# Patient Record
Sex: FEMALE | Race: WHITE | Employment: FULL TIME | URBAN - NONMETROPOLITAN AREA
[De-identification: names, ages, dates, MRNs, and addresses within clinical notes are randomized per-mention and may not be internally consistent; named-entity substitution may affect disease eponyms.]

---

## 2023-09-21 ENCOUNTER — HOSPITAL ENCOUNTER (INPATIENT)
Age: 23
LOS: 2 days | Discharge: HOME | DRG: 343 | End: 2023-09-23
Attending: EMERGENCY MEDICINE | Admitting: SURGERY

## 2023-09-21 ENCOUNTER — ANESTHESIA (OUTPATIENT)
Dept: OPERATING ROOM | Age: 23
End: 2023-09-21

## 2023-09-21 ENCOUNTER — APPOINTMENT (OUTPATIENT)
Dept: CT IMAGING | Age: 23
DRG: 343 | End: 2023-09-21

## 2023-09-21 ENCOUNTER — ANESTHESIA EVENT (OUTPATIENT)
Dept: OPERATING ROOM | Age: 23
End: 2023-09-21

## 2023-09-21 DIAGNOSIS — K35.80 ACUTE APPENDICITIS, UNSPECIFIED ACUTE APPENDICITIS TYPE: Primary | ICD-10-CM

## 2023-09-21 DIAGNOSIS — R10.9 ABDOMINAL PAIN, UNSPECIFIED ABDOMINAL LOCATION: ICD-10-CM

## 2023-09-21 LAB
ALBUMIN SERPL BCG-MCNC: 4.4 G/DL (ref 3.5–5.1)
ALP SERPL-CCNC: 68 U/L (ref 38–126)
ALT SERPL W/O P-5'-P-CCNC: 9 U/L (ref 11–66)
ANION GAP SERPL CALC-SCNC: 11 MEQ/L (ref 8–16)
AST SERPL-CCNC: 15 U/L (ref 5–40)
BACTERIA URNS QL MICRO: ABNORMAL /HPF
BASOPHILS ABSOLUTE: 0 THOU/MM3 (ref 0–0.1)
BASOPHILS NFR BLD AUTO: 0.2 %
BILIRUB CONJ SERPL-MCNC: < 0.2 MG/DL (ref 0–0.3)
BILIRUB SERPL-MCNC: 0.5 MG/DL (ref 0.3–1.2)
BILIRUB UR QL STRIP.AUTO: NEGATIVE
BUN SERPL-MCNC: 11 MG/DL (ref 7–22)
CALCIUM SERPL-MCNC: 9.5 MG/DL (ref 8.5–10.5)
CASTS #/AREA URNS LPF: ABNORMAL /LPF
CASTS 2: ABNORMAL /LPF
CHARACTER UR: ABNORMAL
CHLORIDE SERPL-SCNC: 105 MEQ/L (ref 98–111)
CO2 SERPL-SCNC: 24 MEQ/L (ref 23–33)
COLOR: YELLOW
CREAT SERPL-MCNC: 0.7 MG/DL (ref 0.4–1.2)
CRYSTALS URNS MICRO: ABNORMAL
DEPRECATED RDW RBC AUTO: 42.8 FL (ref 35–45)
EOSINOPHIL NFR BLD AUTO: 0.1 %
EOSINOPHILS ABSOLUTE: 0 THOU/MM3 (ref 0–0.4)
EPITHELIAL CELLS, UA: ABNORMAL /HPF
ERYTHROCYTE [DISTWIDTH] IN BLOOD BY AUTOMATED COUNT: 15.4 % (ref 11.5–14.5)
GFR SERPL CREATININE-BSD FRML MDRD: > 60 ML/MIN/1.73M2
GLUCOSE SERPL-MCNC: 114 MG/DL (ref 70–108)
GLUCOSE UR QL STRIP.AUTO: NEGATIVE MG/DL
HCG UR QL: NEGATIVE
HCT VFR BLD AUTO: 39.6 % (ref 37–47)
HGB BLD-MCNC: 11.9 GM/DL (ref 12–16)
HGB UR QL STRIP.AUTO: NEGATIVE
IMM GRANULOCYTES # BLD AUTO: 0.05 THOU/MM3 (ref 0–0.07)
IMM GRANULOCYTES NFR BLD AUTO: 0.3 %
KETONES UR QL STRIP.AUTO: 40
LIPASE SERPL-CCNC: 28.2 U/L (ref 5.6–51.3)
LYMPHOCYTES ABSOLUTE: 0.7 THOU/MM3 (ref 1–4.8)
LYMPHOCYTES NFR BLD AUTO: 4.2 %
MCH RBC QN AUTO: 23.3 PG (ref 26–33)
MCHC RBC AUTO-ENTMCNC: 30.1 GM/DL (ref 32.2–35.5)
MCV RBC AUTO: 77.6 FL (ref 81–99)
MISCELLANEOUS 2: ABNORMAL
MONOCYTES ABSOLUTE: 1.7 THOU/MM3 (ref 0.4–1.3)
MONOCYTES NFR BLD AUTO: 10.4 %
NEUTROPHILS NFR BLD AUTO: 84.8 %
NITRITE UR QL STRIP: NEGATIVE
NRBC BLD AUTO-RTO: 0 /100 WBC
OSMOLALITY SERPL CALC.SUM OF ELEC: 279.7 MOSMOL/KG (ref 275–300)
PH UR STRIP.AUTO: 8.5 [PH] (ref 5–9)
PLATELET # BLD AUTO: 470 THOU/MM3 (ref 130–400)
PMV BLD AUTO: 10.7 FL (ref 9.4–12.4)
POTASSIUM SERPL-SCNC: 4.3 MEQ/L (ref 3.5–5.2)
PROT SERPL-MCNC: 7.3 G/DL (ref 6.1–8)
PROT UR STRIP.AUTO-MCNC: ABNORMAL MG/DL
RBC # BLD AUTO: 5.1 MILL/MM3 (ref 4.2–5.4)
RBC URINE: ABNORMAL /HPF
RENAL EPI CELLS #/AREA URNS HPF: ABNORMAL /[HPF]
SEGMENTED NEUTROPHILS ABSOLUTE COUNT: 14.2 THOU/MM3 (ref 1.8–7.7)
SODIUM SERPL-SCNC: 140 MEQ/L (ref 135–145)
SP GR UR REFRACT.AUTO: 1.02 (ref 1–1.03)
UROBILINOGEN, URINE: 0.2 EU/DL (ref 0–1)
WBC # BLD AUTO: 16.7 THOU/MM3 (ref 4.8–10.8)
WBC #/AREA URNS HPF: ABNORMAL /HPF
WBC #/AREA URNS HPF: ABNORMAL /[HPF]
YEAST LIKE FUNGI URNS QL MICRO: ABNORMAL

## 2023-09-21 PROCEDURE — 6370000000 HC RX 637 (ALT 250 FOR IP): Performed by: EMERGENCY MEDICINE

## 2023-09-21 PROCEDURE — 80053 COMPREHEN METABOLIC PANEL: CPT

## 2023-09-21 PROCEDURE — 85025 COMPLETE CBC W/AUTO DIFF WBC: CPT

## 2023-09-21 PROCEDURE — 82248 BILIRUBIN DIRECT: CPT

## 2023-09-21 PROCEDURE — 2720000010 HC SURG SUPPLY STERILE: Performed by: SURGERY

## 2023-09-21 PROCEDURE — 6360000002 HC RX W HCPCS: Performed by: NURSE ANESTHETIST, CERTIFIED REGISTERED

## 2023-09-21 PROCEDURE — 3600000003 HC SURGERY LEVEL 3 BASE: Performed by: SURGERY

## 2023-09-21 PROCEDURE — 2580000003 HC RX 258: Performed by: EMERGENCY MEDICINE

## 2023-09-21 PROCEDURE — 6360000002 HC RX W HCPCS: Performed by: EMERGENCY MEDICINE

## 2023-09-21 PROCEDURE — 6360000002 HC RX W HCPCS: Performed by: SURGERY

## 2023-09-21 PROCEDURE — 3700000001 HC ADD 15 MINUTES (ANESTHESIA): Performed by: SURGERY

## 2023-09-21 PROCEDURE — C9399 UNCLASSIFIED DRUGS OR BIOLOG: HCPCS | Performed by: NURSE ANESTHETIST, CERTIFIED REGISTERED

## 2023-09-21 PROCEDURE — 83690 ASSAY OF LIPASE: CPT

## 2023-09-21 PROCEDURE — 81001 URINALYSIS AUTO W/SCOPE: CPT

## 2023-09-21 PROCEDURE — 3700000000 HC ANESTHESIA ATTENDED CARE: Performed by: SURGERY

## 2023-09-21 PROCEDURE — 6360000004 HC RX CONTRAST MEDICATION: Performed by: EMERGENCY MEDICINE

## 2023-09-21 PROCEDURE — 2709999900 HC NON-CHARGEABLE SUPPLY: Performed by: SURGERY

## 2023-09-21 PROCEDURE — 6370000000 HC RX 637 (ALT 250 FOR IP): Performed by: SURGERY

## 2023-09-21 PROCEDURE — 2500000003 HC RX 250 WO HCPCS: Performed by: NURSE ANESTHETIST, CERTIFIED REGISTERED

## 2023-09-21 PROCEDURE — 87086 URINE CULTURE/COLONY COUNT: CPT

## 2023-09-21 PROCEDURE — 99285 EMERGENCY DEPT VISIT HI MDM: CPT

## 2023-09-21 PROCEDURE — 7100000001 HC PACU RECOVERY - ADDTL 15 MIN: Performed by: SURGERY

## 2023-09-21 PROCEDURE — 1200000000 HC SEMI PRIVATE

## 2023-09-21 PROCEDURE — 2580000003 HC RX 258: Performed by: SURGERY

## 2023-09-21 PROCEDURE — 0DTJ4ZZ RESECTION OF APPENDIX, PERCUTANEOUS ENDOSCOPIC APPROACH: ICD-10-PCS | Performed by: SURGERY

## 2023-09-21 PROCEDURE — 74177 CT ABD & PELVIS W/CONTRAST: CPT

## 2023-09-21 PROCEDURE — 88304 TISSUE EXAM BY PATHOLOGIST: CPT

## 2023-09-21 PROCEDURE — 3600000013 HC SURGERY LEVEL 3 ADDTL 15MIN: Performed by: SURGERY

## 2023-09-21 PROCEDURE — 36415 COLL VENOUS BLD VENIPUNCTURE: CPT

## 2023-09-21 PROCEDURE — 81025 URINE PREGNANCY TEST: CPT

## 2023-09-21 PROCEDURE — 7100000000 HC PACU RECOVERY - FIRST 15 MIN: Performed by: SURGERY

## 2023-09-21 RX ORDER — ROCURONIUM BROMIDE 10 MG/ML
INJECTION, SOLUTION INTRAVENOUS PRN
Status: DISCONTINUED | OUTPATIENT
Start: 2023-09-21 | End: 2023-09-21 | Stop reason: SDUPTHER

## 2023-09-21 RX ORDER — ONDANSETRON 2 MG/ML
4 INJECTION INTRAMUSCULAR; INTRAVENOUS EVERY 6 HOURS PRN
Status: DISCONTINUED | OUTPATIENT
Start: 2023-09-21 | End: 2023-09-23 | Stop reason: HOSPADM

## 2023-09-21 RX ORDER — PHENYLEPHRINE HCL IN 0.9% NACL 1 MG/10 ML
SYRINGE (ML) INTRAVENOUS PRN
Status: DISCONTINUED | OUTPATIENT
Start: 2023-09-21 | End: 2023-09-21 | Stop reason: SDUPTHER

## 2023-09-21 RX ORDER — BUPIVACAINE HYDROCHLORIDE 5 MG/ML
INJECTION, SOLUTION EPIDURAL; INTRACAUDAL PRN
Status: DISCONTINUED | OUTPATIENT
Start: 2023-09-21 | End: 2023-09-21 | Stop reason: ALTCHOICE

## 2023-09-21 RX ORDER — ACETAMINOPHEN 500 MG
1000 TABLET ORAL
Status: COMPLETED | OUTPATIENT
Start: 2023-09-21 | End: 2023-09-21

## 2023-09-21 RX ORDER — ONDANSETRON 2 MG/ML
4 INJECTION INTRAMUSCULAR; INTRAVENOUS EVERY 6 HOURS PRN
Status: DISCONTINUED | OUTPATIENT
Start: 2023-09-21 | End: 2023-09-21 | Stop reason: SDUPTHER

## 2023-09-21 RX ORDER — FENTANYL CITRATE 50 UG/ML
INJECTION, SOLUTION INTRAMUSCULAR; INTRAVENOUS PRN
Status: DISCONTINUED | OUTPATIENT
Start: 2023-09-21 | End: 2023-09-21 | Stop reason: SDUPTHER

## 2023-09-21 RX ORDER — METRONIDAZOLE 500 MG/100ML
500 INJECTION, SOLUTION INTRAVENOUS ONCE
Status: CANCELLED | OUTPATIENT
Start: 2023-09-21 | End: 2023-09-21

## 2023-09-21 RX ORDER — SUCCINYLCHOLINE/SOD CL,ISO/PF 200MG/10ML
SYRINGE (ML) INTRAVENOUS PRN
Status: DISCONTINUED | OUTPATIENT
Start: 2023-09-21 | End: 2023-09-21 | Stop reason: SDUPTHER

## 2023-09-21 RX ORDER — SODIUM CHLORIDE 9 MG/ML
INJECTION, SOLUTION INTRAVENOUS PRN
Status: DISCONTINUED | OUTPATIENT
Start: 2023-09-21 | End: 2023-09-21 | Stop reason: SDUPTHER

## 2023-09-21 RX ORDER — SODIUM CHLORIDE 0.9 % (FLUSH) 0.9 %
5-40 SYRINGE (ML) INJECTION PRN
Status: DISCONTINUED | OUTPATIENT
Start: 2023-09-21 | End: 2023-09-23 | Stop reason: HOSPADM

## 2023-09-21 RX ORDER — TESTOSTERONE CYPIONATE 200 MG/ML
0.25 VIAL (ML) INTRAMUSCULAR WEEKLY
Status: ON HOLD | COMMUNITY
End: 2023-09-23 | Stop reason: HOSPADM

## 2023-09-21 RX ORDER — KETOROLAC TROMETHAMINE 30 MG/ML
INJECTION, SOLUTION INTRAMUSCULAR; INTRAVENOUS PRN
Status: DISCONTINUED | OUTPATIENT
Start: 2023-09-21 | End: 2023-09-21 | Stop reason: SDUPTHER

## 2023-09-21 RX ORDER — MORPHINE SULFATE 4 MG/ML
4 INJECTION, SOLUTION INTRAMUSCULAR; INTRAVENOUS
Status: DISCONTINUED | OUTPATIENT
Start: 2023-09-21 | End: 2023-09-21

## 2023-09-21 RX ORDER — 0.9 % SODIUM CHLORIDE 0.9 %
1000 INTRAVENOUS SOLUTION INTRAVENOUS ONCE
Status: COMPLETED | OUTPATIENT
Start: 2023-09-21 | End: 2023-09-21

## 2023-09-21 RX ORDER — SODIUM CHLORIDE 0.9 % (FLUSH) 0.9 %
5-40 SYRINGE (ML) INJECTION EVERY 12 HOURS SCHEDULED
Status: DISCONTINUED | OUTPATIENT
Start: 2023-09-21 | End: 2023-09-23 | Stop reason: HOSPADM

## 2023-09-21 RX ORDER — PROPOFOL 10 MG/ML
INJECTION, EMULSION INTRAVENOUS PRN
Status: DISCONTINUED | OUTPATIENT
Start: 2023-09-21 | End: 2023-09-21 | Stop reason: SDUPTHER

## 2023-09-21 RX ORDER — ONDANSETRON 4 MG/1
4 TABLET, ORALLY DISINTEGRATING ORAL EVERY 8 HOURS PRN
Status: DISCONTINUED | OUTPATIENT
Start: 2023-09-21 | End: 2023-09-21 | Stop reason: SDUPTHER

## 2023-09-21 RX ORDER — SODIUM CHLORIDE 0.9 % (FLUSH) 0.9 %
5-40 SYRINGE (ML) INJECTION EVERY 12 HOURS SCHEDULED
Status: DISCONTINUED | OUTPATIENT
Start: 2023-09-21 | End: 2023-09-21 | Stop reason: SDUPTHER

## 2023-09-21 RX ORDER — SODIUM CHLORIDE 9 MG/ML
INJECTION, SOLUTION INTRAVENOUS CONTINUOUS
Status: DISCONTINUED | OUTPATIENT
Start: 2023-09-21 | End: 2023-09-21 | Stop reason: SDUPTHER

## 2023-09-21 RX ORDER — SODIUM CHLORIDE 9 MG/ML
INJECTION, SOLUTION INTRAVENOUS CONTINUOUS
Status: DISCONTINUED | OUTPATIENT
Start: 2023-09-21 | End: 2023-09-23 | Stop reason: HOSPADM

## 2023-09-21 RX ORDER — SODIUM CHLORIDE 0.9 % (FLUSH) 0.9 %
5-40 SYRINGE (ML) INJECTION PRN
Status: DISCONTINUED | OUTPATIENT
Start: 2023-09-21 | End: 2023-09-21 | Stop reason: SDUPTHER

## 2023-09-21 RX ORDER — CEFOXITIN 1 G/1
INJECTION, POWDER, FOR SOLUTION INTRAVENOUS PRN
Status: DISCONTINUED | OUTPATIENT
Start: 2023-09-21 | End: 2023-09-21 | Stop reason: SDUPTHER

## 2023-09-21 RX ORDER — SODIUM CHLORIDE 9 MG/ML
INJECTION, SOLUTION INTRAVENOUS PRN
Status: DISCONTINUED | OUTPATIENT
Start: 2023-09-21 | End: 2023-09-23 | Stop reason: HOSPADM

## 2023-09-21 RX ORDER — ONDANSETRON 4 MG/1
4 TABLET, ORALLY DISINTEGRATING ORAL EVERY 8 HOURS PRN
Status: DISCONTINUED | OUTPATIENT
Start: 2023-09-21 | End: 2023-09-23 | Stop reason: HOSPADM

## 2023-09-21 RX ORDER — ACETAMINOPHEN 500 MG
500 TABLET ORAL ONCE
Status: COMPLETED | OUTPATIENT
Start: 2023-09-21 | End: 2023-09-21

## 2023-09-21 RX ORDER — ONDANSETRON 2 MG/ML
INJECTION INTRAMUSCULAR; INTRAVENOUS PRN
Status: DISCONTINUED | OUTPATIENT
Start: 2023-09-21 | End: 2023-09-21 | Stop reason: SDUPTHER

## 2023-09-21 RX ORDER — MIDAZOLAM HYDROCHLORIDE 1 MG/ML
INJECTION INTRAMUSCULAR; INTRAVENOUS PRN
Status: DISCONTINUED | OUTPATIENT
Start: 2023-09-21 | End: 2023-09-21 | Stop reason: SDUPTHER

## 2023-09-21 RX ORDER — OXYCODONE HYDROCHLORIDE 5 MG/1
5 TABLET ORAL EVERY 4 HOURS PRN
Status: DISCONTINUED | OUTPATIENT
Start: 2023-09-21 | End: 2023-09-23 | Stop reason: HOSPADM

## 2023-09-21 RX ORDER — DEXAMETHASONE SODIUM PHOSPHATE 4 MG/ML
INJECTION, SOLUTION INTRA-ARTICULAR; INTRALESIONAL; INTRAMUSCULAR; INTRAVENOUS; SOFT TISSUE PRN
Status: DISCONTINUED | OUTPATIENT
Start: 2023-09-21 | End: 2023-09-21 | Stop reason: SDUPTHER

## 2023-09-21 RX ADMIN — KETOROLAC TROMETHAMINE 30 MG: 30 INJECTION, SOLUTION INTRAMUSCULAR; INTRAVENOUS at 16:46

## 2023-09-21 RX ADMIN — ROCURONIUM BROMIDE 30 MG: 10 INJECTION INTRAVENOUS at 16:29

## 2023-09-21 RX ADMIN — CEFOXITIN SODIUM 1000 MG: 2 POWDER, FOR SOLUTION INTRAVENOUS at 16:30

## 2023-09-21 RX ADMIN — Medication 200 MCG: at 16:34

## 2023-09-21 RX ADMIN — MIDAZOLAM 2 MG: 1 INJECTION INTRAMUSCULAR; INTRAVENOUS at 16:13

## 2023-09-21 RX ADMIN — SODIUM CHLORIDE: 9 INJECTION, SOLUTION INTRAVENOUS at 17:59

## 2023-09-21 RX ADMIN — ACETAMINOPHEN 500 MG: 500 TABLET ORAL at 12:47

## 2023-09-21 RX ADMIN — PROPOFOL 160 MG: 10 INJECTION, EMULSION INTRAVENOUS at 16:17

## 2023-09-21 RX ADMIN — ONDANSETRON 4 MG: 2 INJECTION INTRAMUSCULAR; INTRAVENOUS at 16:22

## 2023-09-21 RX ADMIN — DEXAMETHASONE SODIUM PHOSPHATE 8 MG: 4 INJECTION, SOLUTION INTRAMUSCULAR; INTRAVENOUS at 16:22

## 2023-09-21 RX ADMIN — FENTANYL CITRATE 50 MCG: 50 INJECTION, SOLUTION INTRAMUSCULAR; INTRAVENOUS at 16:34

## 2023-09-21 RX ADMIN — SODIUM CHLORIDE: 9 INJECTION, SOLUTION INTRAVENOUS at 15:18

## 2023-09-21 RX ADMIN — IOPAMIDOL 80 ML: 755 INJECTION, SOLUTION INTRAVENOUS at 12:21

## 2023-09-21 RX ADMIN — OXYCODONE HYDROCHLORIDE 5 MG: 5 TABLET ORAL at 19:30

## 2023-09-21 RX ADMIN — SODIUM CHLORIDE 1000 ML: 9 INJECTION, SOLUTION INTRAVENOUS at 12:11

## 2023-09-21 RX ADMIN — ACETAMINOPHEN 500 MG: 500 TABLET ORAL at 12:32

## 2023-09-21 RX ADMIN — Medication 100 MCG: at 16:30

## 2023-09-21 RX ADMIN — SODIUM CHLORIDE, PRESERVATIVE FREE 10 ML: 5 INJECTION INTRAVENOUS at 15:16

## 2023-09-21 RX ADMIN — HYDROMORPHONE HYDROCHLORIDE 0.5 MG: 1 INJECTION, SOLUTION INTRAMUSCULAR; INTRAVENOUS; SUBCUTANEOUS at 21:35

## 2023-09-21 RX ADMIN — FENTANYL CITRATE 50 MCG: 50 INJECTION, SOLUTION INTRAMUSCULAR; INTRAVENOUS at 16:15

## 2023-09-21 RX ADMIN — Medication 130 MG: at 16:17

## 2023-09-21 RX ADMIN — SUGAMMADEX 200 MG: 100 INJECTION, SOLUTION INTRAVENOUS at 17:01

## 2023-09-21 ASSESSMENT — PAIN DESCRIPTION - PAIN TYPE: TYPE: SURGICAL PAIN

## 2023-09-21 ASSESSMENT — PAIN DESCRIPTION - ORIENTATION: ORIENTATION: MID

## 2023-09-21 ASSESSMENT — PAIN - FUNCTIONAL ASSESSMENT
PAIN_FUNCTIONAL_ASSESSMENT: PREVENTS OR INTERFERES SOME ACTIVE ACTIVITIES AND ADLS
PAIN_FUNCTIONAL_ASSESSMENT: 0-10

## 2023-09-21 ASSESSMENT — PAIN DESCRIPTION - LOCATION
LOCATION: ABDOMEN

## 2023-09-21 ASSESSMENT — PAIN SCALES - GENERAL
PAINLEVEL_OUTOF10: 8
PAINLEVEL_OUTOF10: 3
PAINLEVEL_OUTOF10: 6
PAINLEVEL_OUTOF10: 8
PAINLEVEL_OUTOF10: 6
PAINLEVEL_OUTOF10: 3
PAINLEVEL_OUTOF10: 6
PAINLEVEL_OUTOF10: 6
PAINLEVEL_OUTOF10: 4

## 2023-09-21 ASSESSMENT — PAIN DESCRIPTION - ONSET: ONSET: ON-GOING

## 2023-09-21 ASSESSMENT — PAIN DESCRIPTION - FREQUENCY: FREQUENCY: CONTINUOUS

## 2023-09-21 ASSESSMENT — PAIN DESCRIPTION - DESCRIPTORS: DESCRIPTORS: SORE

## 2023-09-21 NOTE — PROGRESS NOTES
1709- pt to pacu. Moving erratically in bed. Verbal redirection, successful. VSS pt appears in no acute distress. 1718- Pt resting in bed eyes closed. Denies pain when asked. 1721- report called to 6A nurse Lety Jacques- pt talkative. In bed eyes open. Continues to deny pain, nausea. 1739- pt meets criteria for discharge from pacu. Awaiting transport.     1741-transport arrives to take pt back to 6a

## 2023-09-21 NOTE — ED PROVIDER NOTES

## 2023-09-21 NOTE — ED TRIAGE NOTES
Pt to ED due to lower right abdominal pain starting last night. Pt states that he felt a sudden pain and it has not gone away. Pt states that he has had one bowel movement that was very hard. Pt states he is nauseous. States he tried to take aleve this morning but became nauseous and vomited. Pt lower abdomen is painful to palpation.

## 2023-09-21 NOTE — BRIEF OP NOTE
Brief Postoperative Note      Patient: Berhane Duenas  YOB: 2000  MRN: [id number]    Date of Procedure: 9/21/2023    PRE- OPaCUTE aPPENDICITIS    Post-Op Diagnosis: SAME       Procedure(s):  Laparoscopic Appendectomy    Surgeon(s):  Modesta Colón MD    Assistant:      Anesthesia: General    Estimated Blood Loss (mL): 5 ML    Complications: NONE    Specimens:   ID Type Source Tests Collected by Time Destination   A : appendix Tissue Appendix SURGICAL PATHOLOGY Modesta Colón MD 9/21/2023 1638        Implants:        Drains:   NG/OG/NJ/NE Tube Orogastric 18 fr Center mouth (Active)       Findings: SEE OP NOTE      Electronically signed by Modesta Colón MD on 9/21/2023 at 5:16 PM

## 2023-09-21 NOTE — PROGRESS NOTES
Patient voided, washed abdomen with CHG soap, SCD's applied. Zosyn was not present on floor to be able to hang pre-op. Patient's roommates accompanied patient leaving unit for OR at this time.

## 2023-09-21 NOTE — H&P
[place] [address]                              HISTORY AND PHYSICAL    PATIENT NAME: Vina Lesches                    :        2000  MED REC NO:   [id number]                           ROOM:       0009  ACCOUNT NO:   [de-identified]                           ADMIT DATE: 2023  PROVIDER:     Mary Rabago. Chris Cooper MD    CHIEF COMPLAINT:  Acute appendicitis. HISTORY OF PRESENT ILLNESS:  The patient is a 32-year-old female who, I  believe, may be transitioning to a male, who has otherwise been  previously healthy, but had onset last night of right-sided abdominal  pain. It became more severe, localized to the right lower quadrant. She presented to the emergency room for evaluation. In the ER, she is  known to have a leukocytosis of 16,000 and had a CT scan showing  evidence of acute appendicitis and surgical evaluation has been  requested. PAST MEDICAL HISTORY/MEDICAL ILLNESSES:  None. Again, the patient is  currently on testosterone and, I believe, is a female transitioning to a  male. SURGERIES:  [place] teeth removed, otherwise no surgeries. ALLERGIES:  TO LATEX. FAMILY HISTORY:  Negative for appendicitis. SOCIAL HISTORY:  The patient is a nonsmoker but does admit to alcohol  use socially. MEDICATIONS:  Testosterone. REVIEW OF SYSTEMS:  10-point review of systems otherwise negative. 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 PHYSICAL EXAMINATION:  GENERAL:  The patient is a 32-year-old, I guess, female, as per the  chart, who appears her age. HEAD, EARS, EYES, NOSE, AND THROAT:  Pupils are equal.  EOMs are intact. Sclerae are clear. CARDIAC:  S1, S2.  LUNGS:  Respirations are clear. ABDOMEN:  Soft. Bowel sounds are positive. The patient has a mild  positive Rovsing's sign, has a clear-cut positive pain to palpation over  McBurney's point. :  Groins are negative.   EXTREMITIES:

## 2023-09-21 NOTE — PROGRESS NOTES
Patient arrived to  in wheelchair from ED. INT present in [facility]. Oriented to room. Call light within reach.

## 2023-09-21 NOTE — PROGRESS NOTES
Patient returned to  following surgery. Patient states pain is 4/10 and tolerable. Sites x3 with steri strips and bandaids, all dry and intact. Ice pack in place. SCD's reapplied and turned on. 0.9NS infusing into LAC, new bag hung. Patient updated on plan of care. Bed alarm turned on. Call light within reach.

## 2023-09-22 LAB
BACTERIA UR CULT: ABNORMAL
BASOPHILS ABSOLUTE: 0 THOU/MM3 (ref 0–0.1)
BASOPHILS NFR BLD AUTO: 0.1 %
DEPRECATED RDW RBC AUTO: 44.6 FL (ref 35–45)
EOSINOPHIL NFR BLD AUTO: 0 %
EOSINOPHILS ABSOLUTE: 0 THOU/MM3 (ref 0–0.4)
ERYTHROCYTE [DISTWIDTH] IN BLOOD BY AUTOMATED COUNT: 15.8 % (ref 11.5–14.5)
HCT VFR BLD AUTO: 31.7 % (ref 37–47)
HGB BLD-MCNC: 9.5 GM/DL (ref 12–16)
IMM GRANULOCYTES # BLD AUTO: 0.09 THOU/MM3 (ref 0–0.07)
IMM GRANULOCYTES NFR BLD AUTO: 0.5 %
LYMPHOCYTES ABSOLUTE: 1.1 THOU/MM3 (ref 1–4.8)
LYMPHOCYTES NFR BLD AUTO: 6.4 %
MCH RBC QN AUTO: 23.4 PG (ref 26–33)
MCHC RBC AUTO-ENTMCNC: 30 GM/DL (ref 32.2–35.5)
MCV RBC AUTO: 78.1 FL (ref 81–99)
MONOCYTES ABSOLUTE: 1.4 THOU/MM3 (ref 0.4–1.3)
MONOCYTES NFR BLD AUTO: 8.2 %
NEUTROPHILS NFR BLD AUTO: 84.8 %
NRBC BLD AUTO-RTO: 0 /100 WBC
ORGANISM: ABNORMAL
PLATELET # BLD AUTO: 462 THOU/MM3 (ref 130–400)
PMV BLD AUTO: 10.5 FL (ref 9.4–12.4)
RBC # BLD AUTO: 4.06 MILL/MM3 (ref 4.2–5.4)
SEGMENTED NEUTROPHILS ABSOLUTE COUNT: 14.8 THOU/MM3 (ref 1.8–7.7)
WBC # BLD AUTO: 17.4 THOU/MM3 (ref 4.8–10.8)

## 2023-09-22 PROCEDURE — 36415 COLL VENOUS BLD VENIPUNCTURE: CPT

## 2023-09-22 PROCEDURE — 6370000000 HC RX 637 (ALT 250 FOR IP): Performed by: SURGERY

## 2023-09-22 PROCEDURE — 1200000000 HC SEMI PRIVATE

## 2023-09-22 PROCEDURE — 6360000002 HC RX W HCPCS: Performed by: SURGERY

## 2023-09-22 PROCEDURE — 85025 COMPLETE CBC W/AUTO DIFF WBC: CPT

## 2023-09-22 PROCEDURE — 2580000003 HC RX 258: Performed by: SURGERY

## 2023-09-22 RX ORDER — ACETAMINOPHEN 325 MG/1
650 TABLET ORAL EVERY 6 HOURS PRN
Status: DISCONTINUED | OUTPATIENT
Start: 2023-09-22 | End: 2023-09-23 | Stop reason: HOSPADM

## 2023-09-22 RX ADMIN — OXYCODONE HYDROCHLORIDE 5 MG: 5 TABLET ORAL at 04:25

## 2023-09-22 RX ADMIN — SODIUM CHLORIDE: 9 INJECTION, SOLUTION INTRAVENOUS at 11:04

## 2023-09-22 RX ADMIN — SODIUM CHLORIDE: 9 INJECTION, SOLUTION INTRAVENOUS at 02:57

## 2023-09-22 RX ADMIN — HYDROMORPHONE HYDROCHLORIDE 0.5 MG: 1 INJECTION, SOLUTION INTRAMUSCULAR; INTRAVENOUS; SUBCUTANEOUS at 02:59

## 2023-09-22 RX ADMIN — OXYCODONE HYDROCHLORIDE 5 MG: 5 TABLET ORAL at 13:51

## 2023-09-22 RX ADMIN — ACETAMINOPHEN 650 MG: 325 TABLET ORAL at 16:39

## 2023-09-22 RX ADMIN — OXYCODONE HYDROCHLORIDE 5 MG: 5 TABLET ORAL at 08:27

## 2023-09-22 RX ADMIN — ACETAMINOPHEN 650 MG: 325 TABLET ORAL at 10:30

## 2023-09-22 RX ADMIN — OXYCODONE HYDROCHLORIDE 5 MG: 5 TABLET ORAL at 18:02

## 2023-09-22 RX ADMIN — OXYCODONE HYDROCHLORIDE 5 MG: 5 TABLET ORAL at 22:04

## 2023-09-22 ASSESSMENT — PAIN SCALES - GENERAL
PAINLEVEL_OUTOF10: 6
PAINLEVEL_OUTOF10: 0
PAINLEVEL_OUTOF10: 6
PAINLEVEL_OUTOF10: 6
PAINLEVEL_OUTOF10: 7
PAINLEVEL_OUTOF10: 3
PAINLEVEL_OUTOF10: 9
PAINLEVEL_OUTOF10: 3
PAINLEVEL_OUTOF10: 5
PAINLEVEL_OUTOF10: 6
PAINLEVEL_OUTOF10: 9
PAINLEVEL_OUTOF10: 3
PAINLEVEL_OUTOF10: 3
PAINLEVEL_OUTOF10: 5
PAINLEVEL_OUTOF10: 3

## 2023-09-22 ASSESSMENT — PAIN DESCRIPTION - ORIENTATION
ORIENTATION: RIGHT;UPPER;OUTER
ORIENTATION: RIGHT;UPPER;OUTER

## 2023-09-22 ASSESSMENT — PAIN - FUNCTIONAL ASSESSMENT: PAIN_FUNCTIONAL_ASSESSMENT: PREVENTS OR INTERFERES SOME ACTIVE ACTIVITIES AND ADLS

## 2023-09-22 ASSESSMENT — PAIN DESCRIPTION - ONSET: ONSET: ON-GOING

## 2023-09-22 ASSESSMENT — PAIN DESCRIPTION - PAIN TYPE: TYPE: SURGICAL PAIN

## 2023-09-22 ASSESSMENT — PAIN DESCRIPTION - DESCRIPTORS
DESCRIPTORS: ACHING
DESCRIPTORS: SPASM

## 2023-09-22 ASSESSMENT — PAIN DESCRIPTION - LOCATION
LOCATION: ABDOMEN

## 2023-09-22 ASSESSMENT — PAIN DESCRIPTION - FREQUENCY: FREQUENCY: CONTINUOUS

## 2023-09-22 NOTE — PLAN OF CARE
Problem: Discharge Planning  Goal: Discharge to home or other facility with appropriate resources  9/22/2023 0851 by Mary Jo Murillo RN  Outcome: Progressing  Flowsheets (Taken 9/22/2023 4058)  Discharge to home or other facility with appropriate resources:   Identify barriers to discharge with patient and caregiver   Arrange for needed discharge resources and transportation as appropriate   Identify discharge learning needs (meds, wound care, etc)     Problem: Pain  Goal: Verbalizes/displays adequate comfort level or baseline comfort level  9/22/2023 0851 by Mary Jo Murillo RN  Outcome: Progressing  Flowsheets (Taken 9/22/2023 7216)  Verbalizes/displays adequate comfort level or baseline comfort level:   Encourage patient to monitor pain and request assistance   Assess pain using appropriate pain scale   Administer analgesics based on type and severity of pain and evaluate response   Implement non-pharmacological measures as appropriate and evaluate response     Problem: Respiratory - Adult  Goal: Achieves optimal ventilation and oxygenation  9/22/2023 0851 by Mary Jo Murillo RN  Outcome: Progressing  Flowsheets (Taken 9/22/2023 4307)  Achieves optimal ventilation and oxygenation:   Assess for changes in respiratory status   Assess for changes in mentation and behavior     Problem: Gastrointestinal - Adult  Goal: Minimal or absence of nausea and vomiting  9/22/2023 0851 by Mary Jo Murillo RN  Outcome: Progressing  Flowsheets (Taken 9/22/2023 2930)  Minimal or absence of nausea and vomiting:   Administer IV fluids as ordered to ensure adequate hydration   Administer ordered antiemetic medications as needed     Problem: Gastrointestinal - Adult  Goal: Maintains or returns to baseline bowel function  9/22/2023 0851 by Mary Jo Murillo RN  Outcome: Progressing  Flowsheets (Taken 9/22/2023 3284)  Maintains or returns to baseline bowel function:   Assess bowel function   Encourage oral fluids to ensure adequate

## 2023-09-22 NOTE — PLAN OF CARE
Problem: Discharge Planning  Goal: Discharge to home or other facility with appropriate resources  Outcome: Progressing  Flowsheets (Taken 9/21/2023 1928)  Discharge to home or other facility with appropriate resources:   Identify barriers to discharge with patient and caregiver   Arrange for needed discharge resources and transportation as appropriate   Identify discharge learning needs (meds, wound care, etc)     Problem: Pain  Goal: Verbalizes/displays adequate comfort level or baseline comfort level  Outcome: Progressing  Flowsheets (Taken 9/21/2023 1928)  Verbalizes/displays adequate comfort level or baseline comfort level:   Encourage patient to monitor pain and request assistance   Assess pain using appropriate pain scale   Administer analgesics based on type and severity of pain and evaluate response   Implement non-pharmacological measures as appropriate and evaluate response   Consider cultural and social influences on pain and pain management     Problem: Respiratory - Adult  Goal: Achieves optimal ventilation and oxygenation  Outcome: Progressing  Flowsheets (Taken 9/21/2023 1928)  Achieves optimal ventilation and oxygenation:   Assess for changes in respiratory status   Assess for changes in mentation and behavior   Position to facilitate oxygenation and minimize respiratory effort   Encourage broncho-pulmonary hygiene including cough, deep breathe, incentive spirometry   Assess and instruct to report shortness of breath or any respiratory difficulty     Problem: Gastrointestinal - Adult  Goal: Minimal or absence of nausea and vomiting  Outcome: Progressing  Flowsheets (Taken 9/21/2023 1928)  Minimal or absence of nausea and vomiting:   Administer IV fluids as ordered to ensure adequate hydration   Administer ordered antiemetic medications as needed   Provide nonpharmacologic comfort measures as appropriate     Problem: Gastrointestinal - Adult  Goal: Maintains or returns to baseline bowel function  Outcome: Progressing  Flowsheets (Taken 9/21/2023 1928)  Maintains or returns to baseline bowel function:   Assess bowel function   Encourage oral fluids to ensure adequate hydration   Administer IV fluids as ordered to ensure adequate hydration   Administer ordered medications as needed   Encourage mobilization and activity     Problem: Infection - Adult  Goal: Absence of infection during hospitalization  Outcome: Progressing  Flowsheets (Taken 9/21/2023 1928)  Absence of infection during hospitalization:   Assess and monitor for signs and symptoms of infection   Monitor lab/diagnostic results   Administer medications as ordered   Instruct and encourage patient and family to use good hand hygiene technique     Problem: Skin/Tissue Integrity - Adult  Goal: Incisions, wounds, or drain sites healing without S/S of infection  Outcome: Progressing  Flowsheets (Taken 9/21/2023 2039)  Incisions, Wounds, or Drain Sites Healing Without Sign and Symptoms of Infection:   TWICE DAILY: Assess and document dressing/incision, wound bed, drain sites and surrounding tissue   Implement wound care per orders       Care plan reviewed with patient. Patient verbalizes understanding of the plan of care and contributes to goal setting.

## 2023-09-22 NOTE — OP NOTE
[place] [address]                                OPERATIVE REPORT    PATIENT NAME: Luisa Mario                    :        2000  MED REC NO:   [id number]                           ROOM:       0009  ACCOUNT NO:   [de-identified]                           ADMIT DATE: 2023  PROVIDER:     Salome Cruz. Coleman Silva MD    DATE OF PROCEDURE:  2023    PREOPERATIVE DIAGNOSIS:  Acute appendicitis. POSTOPERATIVE DIAGNOSIS:  Acute appendicitis. OPERATION:  Laparoscopic appendectomy. SURGEON:  Salome Cruz. MD Francisco    ANESTHESIA:  General.    COMPLICATIONS:  None. BLOOD LOSS:  5 mL. INDICATIONS FOR PROCEDURE:  The patient is a 71-year-old transitioning  from female to male who developed abdominal pain. CT scan showed  appendicitis. The patient is here for appendectomy. The patient did  have an appendicolith. We did discuss potential treatment with  antibiotics although appendicolith is a contraindication to antibiotic  treatment. FINDINGS:  The patient had a very inflamed appendix. There was pus on  its outside. Standard appendectomy was performed. The right ovary  appeared normal.    DESCRIPTION OF THE PROCEDURE:  The patient was brought to operating  suite, placed supine on the operating room table. After adequate  inhalational anesthesia was administered, the patient's abdomen was  prepped and draped in the usual sterile fashion. An incision was made  below the umbilicus. Veress needle was placed. CO2 was insufflated to  a pressure of 15. Veress needle was removed. A trocar was placed and a  camera was placed through the trocar verifying intra-abdominal placement  of trocar. A 5-mm suprapubic port was placed. A 12-mm lateral  abdominal wall port was placed. Then, the cecum was grasped and the  appendix could be seen, was clearly inflamed, was also in a curlicue  fashion.   I passed an Endo PUNEET into the abdominal cavity, made a plane  between the appendix and the cecum and passed the Endo PUNEET across the  base of the cecum  the appendix. We then passed an Endo PUNEET  across the mesoappendix dividing the mesoappendix and then delivered the  appendix out of the abdominal cavity with an Endo Catch. We went down  and viewed the right ovary that was normal.  The area around the  appendix stump was normal.  There was no bleeding. Then, closure was  begun. The trocars were removed as air was aspirated out of the  abdominal cavity. Interrupted 4-0 Vicryl was used to close the skin. Steri-Strips were applied. 10 mL of Marcaine was applied. The patient  tolerated the procedure well. MIKI GONZALEZ [facility], MD    D: 09/21/2023 17:28:00       T: 09/21/2023 17:31:31     [email or web address]  Job#: [id number]     Doc#: [id number]    CC:

## 2023-09-22 NOTE — CARE COORDINATION
Case Management Assessment  Initial Evaluation    Date/Time of Evaluation: 9/22/2023 9:18 AM  Assessment Completed by: Deandra Mckeon RN    If patient is discharged prior to next notation, then this note serves as note for discharge by case management. Patient Name: Yumiko Mantilla                   YOB: 2000  Diagnosis: Acute appendicitis [K35.80]  Acute appendicitis, unspecified acute appendicitis type [K35.80]                   Date / Time: 9/21/2023 10:56 AM  Location: [facility]09/009     Patient Admission Status: Inpatient   Readmission Risk Low 0-14, Mod 15-19), High > 20: Readmission Risk Score: 3.8    Current PCP: No primary care provider on file. PCP verified by CM? Yes    Chart Reviewed: Yes      History Provided by: Patient  Patient Orientation: Alert and Oriented    Patient Cognition: Alert    Hospitalization in the last 30 days (Readmission):  No    If yes, Readmission Assessment in CM Navigator will be completed. Advance Directives:      Code Status: Full Code   Patient's Primary Decision Maker is: Patient Declined (Legal Next of Kin Remains as Decision Maker)      Discharge Planning:    Patient lives with: Friends Type of Home: House  Primary Care Giver: Self  Patient Support Systems include: Spouse/Significant Other, Therapist, Friends/Neighbors   Current Financial resources: Other (Comment) (Patient has insurance; hasn't recieved card yet and doesn't know the insurance provider)  Current community resources: None  Current services prior to admission: Durable Medical Equipment            Current DME: Cane            Type of Home Care services:  None    ADLS  Prior functional level: Independent in ADLs/IADLs  Current functional level: Independent in ADLs/IADLs    Family can provide assistance at DC: Yes (Friends/Family)  Would you like Case Management to discuss the discharge plan with any other family members/significant others, and if so, who?  No  Plans to Return to Present Housing: Yes  Other Identified Issues/Barriers to RETURNING to current housing: none at this time  Potential Assistance needed at discharge: N/A            Potential DME:    Patient expects to discharge to: [address] [place] [address] for transportation at discharge: Friends    Financial    Payor: /     Does insurance require precert for SNF: No    Potential assistance Purchasing Medications: No  Meds-to-Beds request: Yes      [id number], [place] - [address] [phone]  [address] [place] [address] Flory Campbell[address]  Phone: [phone] Fax: [phone]      Notes:    Factors facilitating achievement of predicted outcomes: Friend support, Motivated, Cooperative, and Pleasant    Barriers to discharge: Medication managment    Additional Case Management Notes: To ED for abdominal pain with associated nausea. General surgery. Regular diet. SCDs. IS. IVF. Dilaudid prn; zofran prn; roxicodone prn. WBC 17.4; Hgb 9.5 from 11.9    Procedure:   9/21 CT A/P: Appendicolith is seen. The appendix is dilated at 10 mm. Mild periappendiceal inflammation is noted. Findings relate to acute appendicitis. 9/21 OR with Dr. Jaunita Landau: Laparoscopic appendectomy    The Plan for Transition of Care is related to the following treatment goals of Acute appendicitis [K35.80]  Acute appendicitis, unspecified acute appendicitis type [K35.80]    Patient Goals/Plan/Treatment Preferences: Met with No Ramirez. No Ramirez does not currently have a PCP or insurance listed. No Ramirez states that he has insurance through his job, he just hasn't received the card in the mail and is unsure of the company. He plans to call the hospital once the card arrives to provide insurance information. No Ramirez would like to [facility] until his insurance comes in and he plans to eventually switch. He is independent and still drives. He has a cane from a previous injury. Plans to return home with his 2 roomates who are able to assist if needed.  Declined needs for

## 2023-09-23 VITALS
HEART RATE: 62 BPM | DIASTOLIC BLOOD PRESSURE: 69 MMHG | OXYGEN SATURATION: 99 % | BODY MASS INDEX: 27.4 KG/M2 | WEIGHT: 184.97 LBS | TEMPERATURE: 98 F | SYSTOLIC BLOOD PRESSURE: 112 MMHG | HEIGHT: 69 IN | RESPIRATION RATE: 17 BRPM

## 2023-09-23 LAB
DEPRECATED RDW RBC AUTO: 45.1 FL (ref 35–45)
ERYTHROCYTE [DISTWIDTH] IN BLOOD BY AUTOMATED COUNT: 15.9 % (ref 11.5–14.5)
HCT VFR BLD AUTO: 26.8 % (ref 37–47)
HGB BLD-MCNC: 7.9 GM/DL (ref 12–16)
MCH RBC QN AUTO: 23 PG (ref 26–33)
MCHC RBC AUTO-ENTMCNC: 29.5 GM/DL (ref 32.2–35.5)
MCV RBC AUTO: 78.1 FL (ref 81–99)
PLATELET # BLD AUTO: 362 THOU/MM3 (ref 130–400)
PMV BLD AUTO: 10.7 FL (ref 9.4–12.4)
RBC # BLD AUTO: 3.43 MILL/MM3 (ref 4.2–5.4)
WBC # BLD AUTO: 9.4 THOU/MM3 (ref 4.8–10.8)

## 2023-09-23 PROCEDURE — 6370000000 HC RX 637 (ALT 250 FOR IP): Performed by: SURGERY

## 2023-09-23 PROCEDURE — 36415 COLL VENOUS BLD VENIPUNCTURE: CPT

## 2023-09-23 PROCEDURE — 85027 COMPLETE CBC AUTOMATED: CPT

## 2023-09-23 PROCEDURE — 2580000003 HC RX 258: Performed by: SURGERY

## 2023-09-23 RX ORDER — OXYCODONE HYDROCHLORIDE AND ACETAMINOPHEN 5; 325 MG/1; MG/1
1 TABLET ORAL EVERY 6 HOURS PRN
Qty: 12 TABLET | Refills: 0 | Status: SHIPPED | OUTPATIENT
Start: 2023-09-23 | End: 2023-09-26

## 2023-09-23 RX ADMIN — SODIUM CHLORIDE: 9 INJECTION, SOLUTION INTRAVENOUS at 06:04

## 2023-09-23 RX ADMIN — OXYCODONE HYDROCHLORIDE 5 MG: 5 TABLET ORAL at 14:25

## 2023-09-23 RX ADMIN — ACETAMINOPHEN 650 MG: 325 TABLET ORAL at 07:51

## 2023-09-23 RX ADMIN — ACETAMINOPHEN 650 MG: 325 TABLET ORAL at 16:59

## 2023-09-23 RX ADMIN — OXYCODONE HYDROCHLORIDE 5 MG: 5 TABLET ORAL at 07:51

## 2023-09-23 RX ADMIN — OXYCODONE HYDROCHLORIDE 5 MG: 5 TABLET ORAL at 03:13

## 2023-09-23 ASSESSMENT — PAIN SCALES - GENERAL
PAINLEVEL_OUTOF10: 7
PAINLEVEL_OUTOF10: 3
PAINLEVEL_OUTOF10: 7
PAINLEVEL_OUTOF10: 6
PAINLEVEL_OUTOF10: 7

## 2023-09-23 NOTE — PROGRESS NOTES
CHG soap given to patient at discharge. Instructions given on daily use of CHG to prevent infection. Patient voiced understanding.

## 2023-09-23 NOTE — PLAN OF CARE
Problem: Discharge Planning  Goal: Discharge to home or other facility with appropriate resources  9/22/2023 2138 by Melanie Ruff RN  Outcome: Progressing  Flowsheets (Taken 9/22/2023 2138)  Discharge to home or other facility with appropriate resources:   Identify barriers to discharge with patient and caregiver   Arrange for needed discharge resources and transportation as appropriate   Identify discharge learning needs (meds, wound care, etc)     Problem: Pain  Goal: Verbalizes/displays adequate comfort level or baseline comfort level  9/22/2023 2138 by Melanie Ruff RN  Outcome: Progressing  Flowsheets (Taken 9/22/2023 2138)  Verbalizes/displays adequate comfort level or baseline comfort level:   Assess pain using appropriate pain scale   Encourage patient to monitor pain and request assistance   Administer analgesics based on type and severity of pain and evaluate response   Implement non-pharmacological measures as appropriate and evaluate response     Problem: Respiratory - Adult  Goal: Achieves optimal ventilation and oxygenation  9/22/2023 2138 by Melanie Ruff RN  Outcome: Progressing  Flowsheets (Taken 9/22/2023 2138)  Achieves optimal ventilation and oxygenation:   Assess for changes in respiratory status   Assess for changes in mentation and behavior   Position to facilitate oxygenation and minimize respiratory effort   Oxygen supplementation based on oxygen saturation or arterial blood gases   Respiratory therapy support as indicated   Assess and instruct to report shortness of breath or any respiratory difficulty     Problem: Gastrointestinal - Adult  Goal: Maintains or returns to baseline bowel function  9/22/2023 2138 by Melanie Ruff RN  Outcome: Progressing  Flowsheets (Taken 9/22/2023 2138)  Maintains or returns to baseline bowel function:   Assess bowel function   Administer IV fluids as ordered to ensure adequate hydration   Encourage mobilization and activity   Encourage oral fluids to ensure

## 2023-09-23 NOTE — DISCHARGE INSTRUCTIONS
CARING FOR YOUR INCISION AT HOME    Wash your hands thoroughly with soap and water before touching your incision. Take a clean wash cloth with DYNAHEX and water and cleanse the incision. Pat dry with a clean towel. Steri strips will fall off on their own. Do NOT pick and or pull at them. Let the incision air dry before applying new dressing if needed. Do NOT place anything in or on your incision other than a clean dressing. Do NOT clean your incision with anything other than soap and water unless you were instructed to do so by your physician. Do NOT touch your incision unless your hands are clean and you are performing incision care. Try to touch it at least as possible. NO powders, lotions, perfumes, or deodorants near incisions. If you notice an increase in drainage, only use a clean piece of gauze to cover the incision and notify your physician. General Instructions:  No heavy lifting over 10 pounds.   No driving until instructed by physician  Continue to cough and deep breathe at home  Continue to ambulate at home    Call your Physician if any of the following occur:  Temp above 101 unrelieved with tylenol  Severe nausea and/or vomiting   Shortness of breath  Chest pain   Pain in back of your legs and/or redness and swelling  Any problems with your bowels  Pain not controlled with your prescription  Any problems with your diet  Any redness or excessive drainage from your incision  Anything that appears to look like infection

## 2023-09-25 NOTE — DISCHARGE SUMMARY
[place], [place]                                DISCHARGE SUMMARY    PATIENT NAME: Corine Bolaños                    :        2000  MED REC NO:   [id number]                           ROOM:       0009  ACCOUNT NO:   [de-identified]                           ADMIT DATE: 2023  PROVIDER:     Estrada Lucas. Jaunita Landau, MD              [address] [facility] DATE: 2023    DISCHARGE DIAGNOSIS:  Acute appendicitis. OPERATION:  Laparoscopic appendectomy. HOSPITAL COURSE:  The patient is a 36-year-old trans going from female  to male, who developed appendicitis. The patient was taken to surgery  undergoing a laparoscopic appendectomy. Postop day 1, the white count  was actually elevated up to 17,000, but on postop day 2, it was down to  9.4, feeling better, felt to be discharged home. The patient will be  discharged home, to follow up in my office, resume his testosterone and  Percocet for pain. He is being discharged in stable condition. He will be followed up in the office. MIKI GONZALEZ [facility], MD    D: 2023 9:53:54       T: 2023 12:52:42     JUAN [email or web address]  Job#: [id number]     Doc#: [id number]

## 2023-09-30 ENCOUNTER — HOSPITAL ENCOUNTER (INPATIENT)
Age: 23
LOS: 16 days | Discharge: HOME | DRG: 862 | End: 2023-10-18
Attending: STUDENT IN AN ORGANIZED HEALTH CARE EDUCATION/TRAINING PROGRAM | Admitting: HOSPITALIST

## 2023-09-30 ENCOUNTER — APPOINTMENT (OUTPATIENT)
Dept: CT IMAGING | Age: 23
DRG: 862 | End: 2023-09-30

## 2023-09-30 DIAGNOSIS — R65.10 SIRS (SYSTEMIC INFLAMMATORY RESPONSE SYNDROME) (HCC): ICD-10-CM

## 2023-09-30 DIAGNOSIS — D72.829 LEUKOCYTOSIS, UNSPECIFIED TYPE: ICD-10-CM

## 2023-09-30 DIAGNOSIS — R79.82 ELEVATED C-REACTIVE PROTEIN (CRP): ICD-10-CM

## 2023-09-30 DIAGNOSIS — T81.42XA INFECTION OF DEEP INCISIONAL SURGICAL SITE AFTER PROCEDURE, INITIAL ENCOUNTER: Primary | ICD-10-CM

## 2023-09-30 DIAGNOSIS — D75.839 THROMBOCYTOSIS: ICD-10-CM

## 2023-09-30 DIAGNOSIS — D50.9 MICROCYTIC ANEMIA: ICD-10-CM

## 2023-09-30 DIAGNOSIS — K65.1 ABSCESS OF ABDOMINAL CAVITY (HCC): ICD-10-CM

## 2023-09-30 PROBLEM — T81.43XA POSTOPERATIVE INTRA-ABDOMINAL ABSCESS: Status: ACTIVE | Noted: 2023-09-30

## 2023-09-30 LAB
ALBUMIN SERPL BCG-MCNC: 3.2 G/DL (ref 3.5–5.1)
ALP SERPL-CCNC: 117 U/L (ref 38–126)
ALT SERPL W/O P-5'-P-CCNC: 20 U/L (ref 11–66)
ANION GAP SERPL CALC-SCNC: 12 MEQ/L (ref 8–16)
ANION GAP SERPL CALC-SCNC: 13 MEQ/L (ref 8–16)
AST SERPL-CCNC: 23 U/L (ref 5–40)
B-HCG SERPL QL: NEGATIVE
BASOPHILS ABSOLUTE: 0.1 THOU/MM3 (ref 0–0.1)
BASOPHILS NFR BLD AUTO: 0.3 %
BILIRUB CONJ SERPL-MCNC: 0.3 MG/DL (ref 0–0.3)
BILIRUB SERPL-MCNC: 0.7 MG/DL (ref 0.3–1.2)
BUN SERPL-MCNC: 24 MG/DL (ref 7–22)
BUN SERPL-MCNC: 24 MG/DL (ref 7–22)
CALCIUM SERPL-MCNC: 8.7 MG/DL (ref 8.5–10.5)
CALCIUM SERPL-MCNC: 9.1 MG/DL (ref 8.5–10.5)
CHLORIDE SERPL-SCNC: 100 MEQ/L (ref 98–111)
CHLORIDE SERPL-SCNC: 102 MEQ/L (ref 98–111)
CO2 SERPL-SCNC: 21 MEQ/L (ref 23–33)
CO2 SERPL-SCNC: 21 MEQ/L (ref 23–33)
CREAT SERPL-MCNC: 0.8 MG/DL (ref 0.4–1.2)
CREAT SERPL-MCNC: 0.8 MG/DL (ref 0.4–1.2)
CRP SERPL-MCNC: 47.51 MG/DL (ref 0–1)
DEPRECATED RDW RBC AUTO: 42.2 FL (ref 35–45)
EOSINOPHIL NFR BLD AUTO: 1.7 %
EOSINOPHILS ABSOLUTE: 0.3 THOU/MM3 (ref 0–0.4)
ERYTHROCYTE [DISTWIDTH] IN BLOOD BY AUTOMATED COUNT: 15.9 % (ref 11.5–14.5)
GFR SERPL CREATININE-BSD FRML MDRD: > 60 ML/MIN/1.73M2
GFR SERPL CREATININE-BSD FRML MDRD: > 60 ML/MIN/1.73M2
GLUCOSE SERPL-MCNC: 104 MG/DL (ref 70–108)
GLUCOSE SERPL-MCNC: 122 MG/DL (ref 70–108)
HCT VFR BLD AUTO: 28.7 % (ref 37–47)
HGB BLD-MCNC: 9 GM/DL (ref 12–16)
IMM GRANULOCYTES # BLD AUTO: 0.12 THOU/MM3 (ref 0–0.07)
IMM GRANULOCYTES NFR BLD AUTO: 0.7 %
LACTIC ACID, SEPSIS: 0.9 MMOL/L (ref 0.5–1.9)
LIPASE SERPL-CCNC: 14.2 U/L (ref 5.6–51.3)
LYMPHOCYTES ABSOLUTE: 1.2 THOU/MM3 (ref 1–4.8)
LYMPHOCYTES NFR BLD AUTO: 6.9 %
MAGNESIUM SERPL-MCNC: 2.2 MG/DL (ref 1.6–2.4)
MCH RBC QN AUTO: 23.1 PG (ref 26–33)
MCHC RBC AUTO-ENTMCNC: 31.4 GM/DL (ref 32.2–35.5)
MCV RBC AUTO: 73.6 FL (ref 81–99)
MONOCYTES ABSOLUTE: 1.4 THOU/MM3 (ref 0.4–1.3)
MONOCYTES NFR BLD AUTO: 7.8 %
NEUTROPHILS NFR BLD AUTO: 82.6 %
NRBC BLD AUTO-RTO: 0 /100 WBC
OSMOLALITY SERPL CALC.SUM OF ELEC: 273.6 MOSMOL/KG (ref 275–300)
OSMOLALITY SERPL CALC.SUM OF ELEC: 274.4 MOSMOL/KG (ref 275–300)
PLATELET # BLD AUTO: 727 THOU/MM3 (ref 130–400)
PMV BLD AUTO: 9.5 FL (ref 9.4–12.4)
POTASSIUM SERPL-SCNC: 4.2 MEQ/L (ref 3.5–5.2)
POTASSIUM SERPL-SCNC: 4.3 MEQ/L (ref 3.5–5.2)
PROT SERPL-MCNC: 7.2 G/DL (ref 6.1–8)
RBC # BLD AUTO: 3.9 MILL/MM3 (ref 4.2–5.4)
SEGMENTED NEUTROPHILS ABSOLUTE COUNT: 14.8 THOU/MM3 (ref 1.8–7.7)
SODIUM SERPL-SCNC: 134 MEQ/L (ref 135–145)
SODIUM SERPL-SCNC: 135 MEQ/L (ref 135–145)
WBC # BLD AUTO: 17.9 THOU/MM3 (ref 4.8–10.8)

## 2023-09-30 PROCEDURE — 85025 COMPLETE CBC W/AUTO DIFF WBC: CPT

## 2023-09-30 PROCEDURE — 6370000000 HC RX 637 (ALT 250 FOR IP)

## 2023-09-30 PROCEDURE — 87075 CULTR BACTERIA EXCEPT BLOOD: CPT

## 2023-09-30 PROCEDURE — 6360000002 HC RX W HCPCS: Performed by: RADIOLOGY

## 2023-09-30 PROCEDURE — 2580000003 HC RX 258

## 2023-09-30 PROCEDURE — 87040 BLOOD CULTURE FOR BACTERIA: CPT

## 2023-09-30 PROCEDURE — 87070 CULTURE OTHR SPECIMN AEROBIC: CPT

## 2023-09-30 PROCEDURE — 36415 COLL VENOUS BLD VENIPUNCTURE: CPT

## 2023-09-30 PROCEDURE — 87205 SMEAR GRAM STAIN: CPT

## 2023-09-30 PROCEDURE — 2580000003 HC RX 258: Performed by: STUDENT IN AN ORGANIZED HEALTH CARE EDUCATION/TRAINING PROGRAM

## 2023-09-30 PROCEDURE — 2580000003 HC RX 258: Performed by: PHYSICIAN ASSISTANT

## 2023-09-30 PROCEDURE — 74177 CT ABD & PELVIS W/CONTRAST: CPT

## 2023-09-30 PROCEDURE — 83735 ASSAY OF MAGNESIUM: CPT

## 2023-09-30 PROCEDURE — 6360000004 HC RX CONTRAST MEDICATION: Performed by: STUDENT IN AN ORGANIZED HEALTH CARE EDUCATION/TRAINING PROGRAM

## 2023-09-30 PROCEDURE — 6360000002 HC RX W HCPCS: Performed by: PHYSICIAN ASSISTANT

## 2023-09-30 PROCEDURE — 6360000002 HC RX W HCPCS

## 2023-09-30 PROCEDURE — 83605 ASSAY OF LACTIC ACID: CPT

## 2023-09-30 PROCEDURE — 84703 CHORIONIC GONADOTROPIN ASSAY: CPT

## 2023-09-30 PROCEDURE — C1729 CATH, DRAINAGE: HCPCS

## 2023-09-30 PROCEDURE — 80053 COMPREHEN METABOLIC PANEL: CPT

## 2023-09-30 PROCEDURE — C1769 GUIDE WIRE: HCPCS

## 2023-09-30 PROCEDURE — 86140 C-REACTIVE PROTEIN: CPT

## 2023-09-30 PROCEDURE — G0378 HOSPITAL OBSERVATION PER HR: HCPCS

## 2023-09-30 PROCEDURE — 99285 EMERGENCY DEPT VISIT HI MDM: CPT

## 2023-09-30 PROCEDURE — 99223 1ST HOSP IP/OBS HIGH 75: CPT | Performed by: STUDENT IN AN ORGANIZED HEALTH CARE EDUCATION/TRAINING PROGRAM

## 2023-09-30 PROCEDURE — 83690 ASSAY OF LIPASE: CPT

## 2023-09-30 PROCEDURE — 82248 BILIRUBIN DIRECT: CPT

## 2023-09-30 PROCEDURE — 77012 CT SCAN FOR NEEDLE BIOPSY: CPT

## 2023-09-30 PROCEDURE — 6360000002 HC RX W HCPCS: Performed by: STUDENT IN AN ORGANIZED HEALTH CARE EDUCATION/TRAINING PROGRAM

## 2023-09-30 PROCEDURE — 96365 THER/PROPH/DIAG IV INF INIT: CPT

## 2023-09-30 RX ORDER — ACETAMINOPHEN 650 MG/1
650 SUPPOSITORY RECTAL EVERY 6 HOURS PRN
Status: DISCONTINUED | OUTPATIENT
Start: 2023-09-30 | End: 2023-10-04

## 2023-09-30 RX ORDER — MIDAZOLAM HYDROCHLORIDE 1 MG/ML
INJECTION INTRAMUSCULAR; INTRAVENOUS PRN
Status: COMPLETED | OUTPATIENT
Start: 2023-09-30 | End: 2023-09-30

## 2023-09-30 RX ORDER — SODIUM CHLORIDE 9 MG/ML
INJECTION, SOLUTION INTRAVENOUS PRN
Status: DISCONTINUED | OUTPATIENT
Start: 2023-09-30 | End: 2023-10-18 | Stop reason: HOSPADM

## 2023-09-30 RX ORDER — OXYCODONE HYDROCHLORIDE AND ACETAMINOPHEN 5; 325 MG/1; MG/1
1 TABLET ORAL EVERY 6 HOURS PRN
Status: DISPENSED | OUTPATIENT
Start: 2023-09-30 | End: 2023-10-02

## 2023-09-30 RX ORDER — MORPHINE SULFATE 2 MG/ML
2 INJECTION, SOLUTION INTRAMUSCULAR; INTRAVENOUS
Status: DISCONTINUED | OUTPATIENT
Start: 2023-09-30 | End: 2023-10-18 | Stop reason: HOSPADM

## 2023-09-30 RX ORDER — SODIUM CHLORIDE 0.9 % (FLUSH) 0.9 %
5-40 SYRINGE (ML) INJECTION EVERY 12 HOURS SCHEDULED
Status: DISCONTINUED | OUTPATIENT
Start: 2023-09-30 | End: 2023-10-18 | Stop reason: HOSPADM

## 2023-09-30 RX ORDER — FENTANYL CITRATE 50 UG/ML
INJECTION, SOLUTION INTRAMUSCULAR; INTRAVENOUS PRN
Status: COMPLETED | OUTPATIENT
Start: 2023-09-30 | End: 2023-09-30

## 2023-09-30 RX ORDER — ONDANSETRON 2 MG/ML
4 INJECTION INTRAMUSCULAR; INTRAVENOUS EVERY 6 HOURS PRN
Status: DISCONTINUED | OUTPATIENT
Start: 2023-09-30 | End: 2023-10-18 | Stop reason: HOSPADM

## 2023-09-30 RX ORDER — ENOXAPARIN SODIUM 100 MG/ML
40 INJECTION SUBCUTANEOUS DAILY
Status: DISCONTINUED | OUTPATIENT
Start: 2023-09-30 | End: 2023-10-18 | Stop reason: HOSPADM

## 2023-09-30 RX ORDER — ONDANSETRON 4 MG/1
4 TABLET, ORALLY DISINTEGRATING ORAL EVERY 8 HOURS PRN
Status: DISCONTINUED | OUTPATIENT
Start: 2023-09-30 | End: 2023-10-18 | Stop reason: HOSPADM

## 2023-09-30 RX ORDER — 0.9 % SODIUM CHLORIDE 0.9 %
1000 INTRAVENOUS SOLUTION INTRAVENOUS ONCE
Status: COMPLETED | OUTPATIENT
Start: 2023-09-30 | End: 2023-09-30

## 2023-09-30 RX ORDER — SODIUM CHLORIDE 9 MG/ML
INJECTION, SOLUTION INTRAVENOUS CONTINUOUS
Status: DISCONTINUED | OUTPATIENT
Start: 2023-09-30 | End: 2023-10-08

## 2023-09-30 RX ORDER — 0.9 % SODIUM CHLORIDE 0.9 %
250 INTRAVENOUS SOLUTION INTRAVENOUS ONCE
Status: COMPLETED | OUTPATIENT
Start: 2023-09-30 | End: 2023-09-30

## 2023-09-30 RX ORDER — SODIUM CHLORIDE 0.9 % (FLUSH) 0.9 %
5-40 SYRINGE (ML) INJECTION PRN
Status: DISCONTINUED | OUTPATIENT
Start: 2023-09-30 | End: 2023-10-18 | Stop reason: HOSPADM

## 2023-09-30 RX ORDER — ACETAMINOPHEN 325 MG/1
650 TABLET ORAL EVERY 6 HOURS PRN
Status: DISCONTINUED | OUTPATIENT
Start: 2023-09-30 | End: 2023-10-04

## 2023-09-30 RX ORDER — POLYETHYLENE GLYCOL 3350 17 G/17G
17 POWDER, FOR SOLUTION ORAL DAILY PRN
Status: DISCONTINUED | OUTPATIENT
Start: 2023-09-30 | End: 2023-10-18 | Stop reason: HOSPADM

## 2023-09-30 RX ADMIN — ACETAMINOPHEN 650 MG: 325 TABLET ORAL at 08:28

## 2023-09-30 RX ADMIN — MORPHINE SULFATE 2 MG: 2 INJECTION, SOLUTION INTRAMUSCULAR; INTRAVENOUS at 20:11

## 2023-09-30 RX ADMIN — PIPERACILLIN AND TAZOBACTAM 3375 MG: 3; .375 INJECTION, POWDER, LYOPHILIZED, FOR SOLUTION INTRAVENOUS at 16:58

## 2023-09-30 RX ADMIN — MIDAZOLAM 1 MG: 1 INJECTION INTRAMUSCULAR; INTRAVENOUS at 14:23

## 2023-09-30 RX ADMIN — VANCOMYCIN HYDROCHLORIDE 2000 MG: 1 INJECTION, POWDER, LYOPHILIZED, FOR SOLUTION INTRAVENOUS at 06:01

## 2023-09-30 RX ADMIN — MIDAZOLAM 1 MG: 1 INJECTION INTRAMUSCULAR; INTRAVENOUS at 14:19

## 2023-09-30 RX ADMIN — SODIUM CHLORIDE 250 ML: 9 INJECTION, SOLUTION INTRAVENOUS at 12:05

## 2023-09-30 RX ADMIN — SODIUM CHLORIDE: 9 INJECTION, SOLUTION INTRAVENOUS at 22:25

## 2023-09-30 RX ADMIN — OXYCODONE AND ACETAMINOPHEN 1 TABLET: 5; 325 TABLET ORAL at 03:04

## 2023-09-30 RX ADMIN — FENTANYL CITRATE 50 MCG: 50 INJECTION, SOLUTION INTRAMUSCULAR; INTRAVENOUS at 14:19

## 2023-09-30 RX ADMIN — IOPAMIDOL 80 ML: 755 INJECTION, SOLUTION INTRAVENOUS at 00:53

## 2023-09-30 RX ADMIN — PIPERACILLIN AND TAZOBACTAM 4500 MG: 4; .5 INJECTION, POWDER, LYOPHILIZED, FOR SOLUTION INTRAVENOUS at 01:15

## 2023-09-30 RX ADMIN — SODIUM CHLORIDE: 9 INJECTION, SOLUTION INTRAVENOUS at 03:08

## 2023-09-30 RX ADMIN — OXYCODONE AND ACETAMINOPHEN 1 TABLET: 5; 325 TABLET ORAL at 15:13

## 2023-09-30 RX ADMIN — PIPERACILLIN AND TAZOBACTAM 3375 MG: 3; .375 INJECTION, POWDER, LYOPHILIZED, FOR SOLUTION INTRAVENOUS at 09:23

## 2023-09-30 RX ADMIN — FENTANYL CITRATE 50 MCG: 50 INJECTION, SOLUTION INTRAMUSCULAR; INTRAVENOUS at 14:23

## 2023-09-30 RX ADMIN — OXYCODONE AND ACETAMINOPHEN 1 TABLET: 5; 325 TABLET ORAL at 22:22

## 2023-09-30 RX ADMIN — SODIUM CHLORIDE 1000 ML: 9 INJECTION, SOLUTION INTRAVENOUS at 01:14

## 2023-09-30 RX ADMIN — Medication 1250 MG: at 20:18

## 2023-09-30 RX ADMIN — MORPHINE SULFATE 2 MG: 2 INJECTION, SOLUTION INTRAMUSCULAR; INTRAVENOUS at 16:45

## 2023-09-30 RX ADMIN — ONDANSETRON 4 MG: 2 INJECTION INTRAMUSCULAR; INTRAVENOUS at 08:39

## 2023-09-30 RX ADMIN — ACETAMINOPHEN 650 MG: 325 TABLET ORAL at 17:23

## 2023-09-30 ASSESSMENT — PAIN DESCRIPTION - DESCRIPTORS
DESCRIPTORS: ACHING;SHARP
DESCRIPTORS: ACHING
DESCRIPTORS: SHARP
DESCRIPTORS: ACHING;PRESSURE;SHARP
DESCRIPTORS: ACHING

## 2023-09-30 ASSESSMENT — PAIN DESCRIPTION - FREQUENCY: FREQUENCY: CONTINUOUS

## 2023-09-30 ASSESSMENT — PAIN DESCRIPTION - LOCATION
LOCATION: ABDOMEN

## 2023-09-30 ASSESSMENT — PAIN - FUNCTIONAL ASSESSMENT

## 2023-09-30 ASSESSMENT — PAIN DESCRIPTION - ORIENTATION
ORIENTATION: RIGHT;MID
ORIENTATION: RIGHT;MID
ORIENTATION: MID;RIGHT
ORIENTATION: RIGHT;UPPER
ORIENTATION: RIGHT
ORIENTATION: RIGHT;MID

## 2023-09-30 ASSESSMENT — PAIN SCALES - GENERAL
PAINLEVEL_OUTOF10: 8
PAINLEVEL_OUTOF10: 3
PAINLEVEL_OUTOF10: 6
PAINLEVEL_OUTOF10: 9
PAINLEVEL_OUTOF10: 2
PAINLEVEL_OUTOF10: 4
PAINLEVEL_OUTOF10: 6
PAINLEVEL_OUTOF10: 7
PAINLEVEL_OUTOF10: 7
PAINLEVEL_OUTOF10: 8
PAINLEVEL_OUTOF10: 1

## 2023-09-30 ASSESSMENT — PAIN DESCRIPTION - PAIN TYPE: TYPE: ACUTE PAIN;SURGICAL PAIN

## 2023-09-30 ASSESSMENT — PAIN DESCRIPTION - ONSET: ONSET: ON-GOING

## 2023-09-30 NOTE — PROGRESS NOTES
Pharmacy Note - Extended Infusion Beta-Lactam Adjustment    Piperacillin/Tazobactam 4500mg q6h for treatment of Intra-abdominal Infection. Per [address] Extended Infusion Beta-Lactam Policy, piperacillin/tazobactam will be changed to 4500mg loading dose followed by 3375mg Q8h extended infusion    Please call with any questions.     Thank you,    Surinder Max, [facility]

## 2023-09-30 NOTE — PLAN OF CARE
Problem: Discharge Planning  Goal: Discharge to home or other facility with appropriate resources  Outcome: Progressing  Flowsheets (Taken 9/30/2023 1421)  Discharge to home or other facility with appropriate resources:   Identify barriers to discharge with patient and caregiver   Arrange for needed discharge resources and transportation as appropriate  Note: Patient preference to discharge to home with family support. Problem: Pain  Goal: Verbalizes/displays adequate comfort level or baseline comfort level  Outcome: Progressing  Flowsheets (Taken 9/30/2023 1421)  Verbalizes/displays adequate comfort level or baseline comfort level:   Encourage patient to monitor pain and request assistance   Assess pain using appropriate pain scale   Administer analgesics based on type and severity of pain and evaluate response  Note: Patient pain goal is 0/10. Rest and repositioning for non-medicinal measures. PRN tylenol administered with some effect     Problem: Safety - Adult  Goal: Free from fall injury  Outcome: Progressing  Flowsheets (Taken 9/30/2023 1421)  Free From Fall Injury: Instruct family/caregiver on patient safety  Note: No falls noted this shift. Continue falling star program. Bed alarm on, bed in low position. Call light and personal belongings in reach. Patient uses call light appropriately. Care plan reviewed with patient. Patient verbalized understanding of the plan of care and contribute to goal setting.

## 2023-09-30 NOTE — H&P
[facility]  Sedation/Analgesia History & Physical    Pt Name: Saira Osullivan  MRN: [id number]  YOB: 2000  Provider Performing Procedure: Sly Walker MD, MD  Primary Care Physician: No primary care provider on file. Formulation and discussion of sedation / procedure plans, risks, benefits, side effects and alternatives with patient and/or responsible adult completed. PRE-PROCEDURE   DNR-CCA/DNR-CC []Yes [x]No  Brief History/Pre-Procedure Diagnosis: Abdominal abscess          MEDICAL HISTORY  []CAD/Valve  []Liver Disease  []Lung Disease []Diabetes  []Hypertension []Renal Disease  Add[]itional information:       has a past medical history of Asthma, Migraine, and Pneumonia. SURGICAL HISTORY   has a past surgical history that includes [place] tooth extraction (2019) and laparoscopic appendectomy (N/A, 9/21/2023).   Additional information:       ALLERGIES   Allergies as of 09/30/2023 - Fully Reviewed 09/30/2023   Allergen Reaction Noted    Latex Itching 09/21/2023     Additional information:       MEDICATIONS   Coumadin Use Last 5 Days [x]No []Yes  Antiplatelet drug therapy use last 5 days  [x]No []Yes  Other anticoagulant use last 5 days  [x]No []Yes    Current Facility-Administered Medications:     sodium chloride flush 0.9 % injection 5-40 mL, 5-40 mL, IntraVENous, 2 times per day, Jimbo Argueta MD    sodium chloride flush 0.9 % injection 5-40 mL, 5-40 mL, IntraVENous, PRN, Jimbo Argueta MD    0.9 % sodium chloride infusion, , IntraVENous, PRN, Jimbo Argueta MD    [facility] by provider] enoxaparin (LOVENOX) injection 40 mg, 40 mg, SubCUTAneous, Daily, Jimbo Argueta MD    ondansetron (ZOFRAN-ODT) disintegrating tablet 4 mg, 4 mg, Oral, Q8H PRN **OR** ondansetron (ZOFRAN) injection 4 mg, 4 mg, IntraVENous, Q6H PRN, Jimbo Argueta MD, 4 mg at 09/30/23 0839    polyethylene glycol (GLYCOLAX) packet 17 g, 17 g, Oral, Daily PRN, Jimbo Argueta MD    acetaminophen

## 2023-09-30 NOTE — PLAN OF CARE
Pt admitted after midnight. Called Dr. Suman Molina and added him to the case. IR consulted and plan for abscess drainage / drain placement.     Electronically signed by Robert Snyder PA-C on 9/30/2023 at 12:32 PM

## 2023-09-30 NOTE — PROGRESS NOTES
This Virtual RN completed admission requirements with patient at bedside. This nurse educated patient to use the call light to report any change in condition to bedside nursing staff. Call light within reach at this time.

## 2023-09-30 NOTE — CARE COORDINATION
09/30/23 0952   Readmission Assessment   Number of Days since last admission? 1-7 days   Previous Disposition Home with Family   Who is being Interviewed Patient   What was the patient's/caregiver's perception as to why they think they needed to return back to the hospital? Other (Comment)  (fever, severe abdominal pain)   Did you visit your Primary Care Physician after you left the hospital, before you returned this time? No   Why weren't you able to visit your PCP? Other (Comment)  (Appointment not until next Thursday)   Did you see a specialist, such as Cardiac, Pulmonary, Orthopedic Physician, etc. after you left the hospital? No   Who advised the patient to return to the hospital? Self-referral  ([place])   Does the patient report anything that got in the way of taking their medications? No   In our efforts to provide the best possible care to you and others like you, can you think of anything that we could have done to help you after you left the hospital the first time, so that you might not have needed to return so soon? Other (Comment)  (\"Nothing.  I had resources, you guys have been amazing\")

## 2023-09-30 NOTE — ED TRIAGE NOTES
Pt presents to the ED with complaints of swelling and irritation as well as a fever and chills from an appendectomy that took place on the 21st. Pt states that he has had these symptoms for a couple days now. Last dose of ibuprofen was around 2000. Pt states procedure was done by Dr. Jairo Weeks.

## 2023-09-30 NOTE — FLOWSHEET NOTE
09/30/23 1713   Safe Environment   Safety Measures Bed in low position;Call light within reach; Side rails X 2  (Virtual Nurse Safety Round Complete)     Call placed to patients room, patient responds to audio, permitted video. Patient alert and oriented. Patient denied any voiced conscerns/complaints at this time. Patient educated on utilizig call light. Call light within reach, no signs or symtpoms of distress noted.

## 2023-09-30 NOTE — H&P
Internal Medicine Resident History and Physical          Name: Domonique Ellis, adult, : 2000, MRN: [id number]    PCP: No primary care provider on file. Date of Admission: 2023  Date of Service: Pt seen/examined on 23  and Admitted to Observation with expected LOS less than two midnights due to medical therapy. Assessment and Plan:  Abdominal abscess, s/p appendectomy : Patient noted for signs of infection per HPI, s/p appendectomy. Patient referred to ED by Dr. Yamini Funez for admission to start antibiotics and plans for IR guided abscess drainage  Continue 5-day course Zosyn and vancomycin  Pain management with Percocet as needed  Continue IVF at 75 mL/hour  Blood cultures x2 pending  Consult IR for abscess drainage  Patient NPO  Acute microcytic anemia, stable: Patient noted to have hemoglobin of 9 on admission, which relatively stable from prior patient hemoglobin values s/p appendectomy from . Continue to H&H  Transfusion if hemoglobin less than 7  Chronic Conditions (reviewed and stable unless otherwise stated)   Asthma, controlled: Continue home inhaler as needed  Transgender male: Taking sq testosterone weekly.        =======================================================================      Chief Complaint: Abdominal pain    History Of Present Illness:   Patient is a 81-year-old transgender male, history of asthma and recent appendectomy on , presenting to ED with onset fevers, chills, abdominal pain in the past few days since surgery. Patient seen by Dr. Yamini Funez on , underwent laparoscopic appendectomy, discharged on . Patient was provided ibuprofen and Percocet for pain management. For past 2 days patient notes that he has been having subjective fevers and chills, unable to assess max temperature because he does not have a thermometer. Patient has been taking ibuprofen and Percocet as needed.     Patient complains of minor headache,

## 2023-09-30 NOTE — OP NOTE
Department of Radiology  Post Procedure Progress Note      Pre-Procedure Diagnosis:  Abdominal drain placement. Procedure Performed:  CT guided drain placement. Anesthesia: local / versed and fentanyl    Findings: successful    Immediate Complications:  None    Estimated Blood Loss: minimal    SEE DICTATED PROCEDURE NOTE FOR COMPLETE DETAILS.     Electronically signed by Alexey Black MD on 9/30/2023 at 3:21 PM

## 2023-09-30 NOTE — ED PROVIDER NOTES
[facility] EMERGENCY DEPT    Pt Name: Rafia Cadena  MRN: [id number]  [facility] 2000  Date of evaluation: 9/30/2023  Resident Physician: Amilcar Schmitt MD  Supervising Physician: Christina Funk DO    CHIEF COMPLAINT       Chief Complaint   Patient presents with    Post-op Problem       HISTORY OF PRESENT ILLNESS   Rafia Cadena is a 25 y.o. adult with PMHx of acute appendicitis, pneumonia  who presents to the emergency department for evaluation of postop problem. Patient was diagnosed with acute appendicitis on 9/21/2023 status post appendectomy with Dr. Cherelle Forrest. Patient states that for the past couple of days he has been experiencing fever, chills. Has not been checking temperatures at home as he does not have a thermometer however it has been having tactile fevers. Has been taking ibuprofen intermittently for pain and fevers. Patient has been experiencing swelling and irritation to the surgical incision site. Also stating that he is having painful urination. Patient states last intake was at 2000. Currently denies any need for pain management as he does not like taking pain pills. The patient has no other acute complaints at this time. Review of Systems    Negative Except as Documented Above. PASTMEDICAL HISTORY     Past Medical History:   Diagnosis Date    Asthma     Migraine     Pneumonia 2010       Patient Active Problem List   Diagnosis Code    Acute appendicitis K35.80     SURGICAL HISTORY       Past Surgical History:   Procedure Laterality Date    LAPAROSCOPIC APPENDECTOMY N/A 9/21/2023    Laparoscopic Appendectomy performed by Shanna Doss MD at [facility]  2019       CURRENT MEDICATIONS       Previous Medications    No medications on file       ALLERGIES     is allergic to latex. FAMILY HISTORY     has no family status information on file.         SOCIAL HISTORY       Social History     Tobacco Use    Smoking status: Never    Smokeless

## 2023-09-30 NOTE — PROGRESS NOTES
1354 Pt arrived to CT holding. Skin natural for race, warm, dry. Respirations even and unlabored. 1400 Dr. Lee Bring in to evaluate pt and explain procedure. 1402 Consent obtained. Pt verbalizes agreement of site of procedure and procedure to be performed. 1410 Pt positioned  supine on CT table. Monitor applied. 1415 Pre scans complete. 1421 Procedure started with Dr. Jesus Hooper  [id number] Procedure complete. 1429 Post scans complete. RLQ drain secured with stayfix dressing, covered with opsite, to alonso close drain. 10 ml bloody fluid obtained and sent to lab. 1437 Pt back to bed. Positioned for comfort. 1440 Report called to .   1442 Pt transported to  via bed. Skin natural for race, warm, dry. Respirations even and unlabored. No complaints voiced at this time.

## 2023-09-30 NOTE — CARE COORDINATION
09/30/23 0953   Service Assessment   Patient Orientation Alert and Oriented   Cognition Alert   History Provided By Patient   Primary Caregiver Self   Accompanied By/Relationship none   Support Systems Friends/Neighbors; Therapist   Patient's Healthcare Decision Maker is: Patient Declined (Legal Next of Kin Remains as Decision Maker)   PCP Verified by CM Yes   Last Visit to PCP Within last two years  ([address])   Prior Functional Level Independent in ADLs/IADLs   Current Functional Level Independent in ADLs/IADLs   Can patient return to prior living arrangement Yes   Ability to make needs known: Good   Family able to assist with home care needs: Yes  (Friends/Family)   Would you like for me to discuss the discharge plan with any other family members/significant others, and if so, who? No   Financial Resources Other (Comment)  (Has insurance- unsure of provider. Doesn't have card yet)   [employer] [facility]  (Therapy)   CM/SW Referral Other (see comment)  (na)   Social/Functional History   Lives With Friend(s)  (2 room mates)   Type of [address] [place] [address] Yes   Discharge Planning   Type of Residence House   Living Arrangements Friends   Current Services Prior To Admission Durable Medical Equipment   Current DME Prior to [employer]  (from previous injury)   [address]  [facility])   DME Ordered? No   Potential Assistance Purchasing Medications No   Type of Home Care Services Nursing Services   Patient expects to be discharged to: House   History of falls? 0   Services At/After Discharge   Transition of Care Consult (CM Consult) N/A   Confirm Follow Up Transport Friends     Patient Goals/Plan/Treatment Preferences: Met with Bridgett Light. Verified plan to follow up with Residency clinic to establish PCP (plan last admission). Bridgett Light does have insurance, he just doesn't have the card yet and is unsure of the provider.  Bridgett Light is independent and Anesthesia Volume In Cc (Will Not Render If 0): 2 Validate Triangulation: No Dressing: pressure dressing Biopsy Type: H and E Suture Removal: 7 days Punch Size In Mm: 3 Billing Type: [employer] Size Of Lesion In Cm (Optional): 0 Notification Instructions: Patient will be notified of biopsy results. However, patient instructed to call the office if not contacted within 2 weeks. Consent: Written consent was obtained and risks were reviewed including but not limited to scarring, infection, bleeding, scabbing, incomplete removal, nerve damage and allergy to anesthesia. Render Post-Care Instructions In Note?: yes Anesthesia Type: 1% lidocaine without epinephrine Post-care instructions reviewed with the patient in detail. The patient is to keep the biopsy site dry overnight. Remove the bandage and shower as normal with soap and water, dry the area, apply vaseline and a band-aid. Repeat this process daily until the suture removal appointment. Should the patient develop any fevers, chills, bleeding, severe pain patient will contact the office immediately. Epidermal Sutures: 5-0 Nylon Hemostasis: None Information: Selecting Yes will display possible errors in your note based on the variables you have selected. This validation is only offered as a suggestion for you. PLEASE NOTE THAT THE VALIDATION TEXT WILL BE REMOVED WHEN YOU FINALIZE YOUR NOTE. IF YOU WANT TO FAX A PRELIMINARY NOTE YOU WILL NEED TO TOGGLE THIS TO 'NO' IF YOU DO NOT WANT IT IN YOUR FAXED NOTE. Detail Level: Detailed Home Suture Removal Text: N/A Wound Care: Vaseline

## 2023-10-01 ENCOUNTER — APPOINTMENT (OUTPATIENT)
Dept: GENERAL RADIOLOGY | Age: 23
DRG: 862 | End: 2023-10-01

## 2023-10-01 LAB
ABO: NORMAL
ANION GAP SERPL CALC-SCNC: 13 MEQ/L (ref 8–16)
ANTIBODY SCREEN: NORMAL
BASOPHILS ABSOLUTE: 0.1 THOU/MM3 (ref 0–0.1)
BASOPHILS NFR BLD AUTO: 0.3 %
BUN SERPL-MCNC: 22 MG/DL (ref 7–22)
CALCIUM SERPL-MCNC: 8.6 MG/DL (ref 8.5–10.5)
CHLORIDE SERPL-SCNC: 105 MEQ/L (ref 98–111)
CO2 SERPL-SCNC: 20 MEQ/L (ref 23–33)
CREAT SERPL-MCNC: 1.3 MG/DL (ref 0.4–1.2)
CREAT SERPL-MCNC: 1.6 MG/DL (ref 0.4–1.2)
CRP SERPL-MCNC: 37.28 MG/DL (ref 0–1)
DEPRECATED RDW RBC AUTO: 44.8 FL (ref 35–45)
EOSINOPHIL NFR BLD AUTO: 1.9 %
EOSINOPHILS ABSOLUTE: 0.3 THOU/MM3 (ref 0–0.4)
ERYTHROCYTE [DISTWIDTH] IN BLOOD BY AUTOMATED COUNT: 16.2 % (ref 11.5–14.5)
GFR SERPL CREATININE-BSD FRML MDRD: 47 ML/MIN/1.73M2
GFR SERPL CREATININE-BSD FRML MDRD: 60 ML/MIN/1.73M2
GLUCOSE SERPL-MCNC: 107 MG/DL (ref 70–108)
HCT VFR BLD AUTO: 24.5 % (ref 37–47)
HCT VFR BLD AUTO: 27.2 % (ref 37–47)
HCT VFR BLD AUTO: 27.2 % (ref 37–47)
HGB BLD-MCNC: 7.4 GM/DL (ref 12–16)
HGB BLD-MCNC: 8.2 GM/DL (ref 12–16)
HGB BLD-MCNC: 8.4 GM/DL (ref 12–16)
IMM GRANULOCYTES # BLD AUTO: 0.19 THOU/MM3 (ref 0–0.07)
IMM GRANULOCYTES NFR BLD AUTO: 1.1 %
LACTATE SERPL-SCNC: 1 MMOL/L (ref 0.5–2)
LYMPHOCYTES ABSOLUTE: 1.4 THOU/MM3 (ref 1–4.8)
LYMPHOCYTES NFR BLD AUTO: 8.5 %
MCH RBC QN AUTO: 23 PG (ref 26–33)
MCHC RBC AUTO-ENTMCNC: 30.1 GM/DL (ref 32.2–35.5)
MCV RBC AUTO: 76.2 FL (ref 81–99)
MONOCYTES ABSOLUTE: 1.4 THOU/MM3 (ref 0.4–1.3)
MONOCYTES NFR BLD AUTO: 8.4 %
MRSA DNA SPEC QL NAA+PROBE: NEGATIVE
NEUTROPHILS NFR BLD AUTO: 79.8 %
NRBC BLD AUTO-RTO: 0 /100 WBC
PLATELET # BLD AUTO: 754 THOU/MM3 (ref 130–400)
PMV BLD AUTO: 9.7 FL (ref 9.4–12.4)
POTASSIUM SERPL-SCNC: 4.2 MEQ/L (ref 3.5–5.2)
PROCALCITONIN SERPL IA-MCNC: 0.71 NG/ML (ref 0.01–0.09)
RBC # BLD AUTO: 3.57 MILL/MM3 (ref 4.2–5.4)
RH FACTOR: NORMAL
SEGMENTED NEUTROPHILS ABSOLUTE COUNT: 13.3 THOU/MM3 (ref 1.8–7.7)
SODIUM SERPL-SCNC: 138 MEQ/L (ref 135–145)
VANCOMYCIN SERPL-MCNC: 21.6 UG/ML (ref 0.1–39.9)
WBC # BLD AUTO: 16.7 THOU/MM3 (ref 4.8–10.8)

## 2023-10-01 PROCEDURE — 86140 C-REACTIVE PROTEIN: CPT

## 2023-10-01 PROCEDURE — 84145 PROCALCITONIN (PCT): CPT

## 2023-10-01 PROCEDURE — 36430 TRANSFUSION BLD/BLD COMPNT: CPT

## 2023-10-01 PROCEDURE — 86923 COMPATIBILITY TEST ELECTRIC: CPT

## 2023-10-01 PROCEDURE — 83540 ASSAY OF IRON: CPT

## 2023-10-01 PROCEDURE — 86900 BLOOD TYPING SEROLOGIC ABO: CPT

## 2023-10-01 PROCEDURE — 99232 SBSQ HOSP IP/OBS MODERATE 35: CPT | Performed by: PHYSICIAN ASSISTANT

## 2023-10-01 PROCEDURE — P9016 RBC LEUKOCYTES REDUCED: HCPCS

## 2023-10-01 PROCEDURE — 83550 IRON BINDING TEST: CPT

## 2023-10-01 PROCEDURE — 6360000002 HC RX W HCPCS

## 2023-10-01 PROCEDURE — 80202 ASSAY OF VANCOMYCIN: CPT

## 2023-10-01 PROCEDURE — 71045 X-RAY EXAM CHEST 1 VIEW: CPT

## 2023-10-01 PROCEDURE — 85025 COMPLETE CBC W/AUTO DIFF WBC: CPT

## 2023-10-01 PROCEDURE — 93010 ELECTROCARDIOGRAM REPORT: CPT | Performed by: INTERNAL MEDICINE

## 2023-10-01 PROCEDURE — 80048 BASIC METABOLIC PNL TOTAL CA: CPT

## 2023-10-01 PROCEDURE — G0378 HOSPITAL OBSERVATION PER HR: HCPCS

## 2023-10-01 PROCEDURE — 87641 MR-STAPH DNA AMP PROBE: CPT

## 2023-10-01 PROCEDURE — 85018 HEMOGLOBIN: CPT

## 2023-10-01 PROCEDURE — 83605 ASSAY OF LACTIC ACID: CPT

## 2023-10-01 PROCEDURE — 96365 THER/PROPH/DIAG IV INF INIT: CPT

## 2023-10-01 PROCEDURE — 6370000000 HC RX 637 (ALT 250 FOR IP)

## 2023-10-01 PROCEDURE — 96366 THER/PROPH/DIAG IV INF ADDON: CPT

## 2023-10-01 PROCEDURE — 36415 COLL VENOUS BLD VENIPUNCTURE: CPT

## 2023-10-01 PROCEDURE — 0W9F30Z DRAINAGE OF ABDOMINAL WALL WITH DRAINAGE DEVICE, PERCUTANEOUS APPROACH: ICD-10-PCS | Performed by: RADIOLOGY

## 2023-10-01 PROCEDURE — 86901 BLOOD TYPING SEROLOGIC RH(D): CPT

## 2023-10-01 PROCEDURE — 2580000003 HC RX 258: Performed by: PHYSICIAN ASSISTANT

## 2023-10-01 PROCEDURE — 96361 HYDRATE IV INFUSION ADD-ON: CPT

## 2023-10-01 PROCEDURE — 96375 TX/PRO/DX INJ NEW DRUG ADDON: CPT

## 2023-10-01 PROCEDURE — 86850 RBC ANTIBODY SCREEN: CPT

## 2023-10-01 PROCEDURE — 30233N1 TRANSFUSION OF NONAUTOLOGOUS RED BLOOD CELLS INTO PERIPHERAL VEIN, PERCUTANEOUS APPROACH: ICD-10-PCS | Performed by: HOSPITALIST

## 2023-10-01 PROCEDURE — 85014 HEMATOCRIT: CPT

## 2023-10-01 PROCEDURE — 96376 TX/PRO/DX INJ SAME DRUG ADON: CPT

## 2023-10-01 PROCEDURE — 96367 TX/PROPH/DG ADDL SEQ IV INF: CPT

## 2023-10-01 PROCEDURE — 85651 RBC SED RATE NONAUTOMATED: CPT

## 2023-10-01 PROCEDURE — 2580000003 HC RX 258

## 2023-10-01 PROCEDURE — 6360000002 HC RX W HCPCS: Performed by: PHYSICIAN ASSISTANT

## 2023-10-01 PROCEDURE — 93005 ELECTROCARDIOGRAM TRACING: CPT | Performed by: PHYSICIAN ASSISTANT

## 2023-10-01 RX ORDER — 0.9 % SODIUM CHLORIDE 0.9 %
30 INTRAVENOUS SOLUTION INTRAVENOUS ONCE
Status: COMPLETED | OUTPATIENT
Start: 2023-10-01 | End: 2023-10-01

## 2023-10-01 RX ORDER — SODIUM CHLORIDE 9 MG/ML
INJECTION, SOLUTION INTRAVENOUS PRN
Status: DISCONTINUED | OUTPATIENT
Start: 2023-10-01 | End: 2023-10-18 | Stop reason: HOSPADM

## 2023-10-01 RX ORDER — IBUPROFEN 400 MG/1
400 TABLET ORAL EVERY 6 HOURS PRN
Status: DISCONTINUED | OUTPATIENT
Start: 2023-10-01 | End: 2023-10-05

## 2023-10-01 RX ADMIN — ACETAMINOPHEN 650 MG: 325 TABLET ORAL at 00:17

## 2023-10-01 RX ADMIN — MORPHINE SULFATE 2 MG: 2 INJECTION, SOLUTION INTRAMUSCULAR; INTRAVENOUS at 14:10

## 2023-10-01 RX ADMIN — PIPERACILLIN AND TAZOBACTAM 3375 MG: 3; .375 INJECTION, POWDER, LYOPHILIZED, FOR SOLUTION INTRAVENOUS at 18:31

## 2023-10-01 RX ADMIN — MORPHINE SULFATE 2 MG: 2 INJECTION, SOLUTION INTRAMUSCULAR; INTRAVENOUS at 00:17

## 2023-10-01 RX ADMIN — SODIUM CHLORIDE, PRESERVATIVE FREE 10 ML: 5 INJECTION INTRAVENOUS at 21:11

## 2023-10-01 RX ADMIN — PIPERACILLIN AND TAZOBACTAM 3375 MG: 3; .375 INJECTION, POWDER, LYOPHILIZED, FOR SOLUTION INTRAVENOUS at 09:48

## 2023-10-01 RX ADMIN — MORPHINE SULFATE 2 MG: 2 INJECTION, SOLUTION INTRAMUSCULAR; INTRAVENOUS at 04:30

## 2023-10-01 RX ADMIN — MORPHINE SULFATE 2 MG: 2 INJECTION, SOLUTION INTRAMUSCULAR; INTRAVENOUS at 21:10

## 2023-10-01 RX ADMIN — ACETAMINOPHEN 650 MG: 325 TABLET ORAL at 19:17

## 2023-10-01 RX ADMIN — Medication 1250 MG: at 06:13

## 2023-10-01 RX ADMIN — MORPHINE SULFATE 2 MG: 2 INJECTION, SOLUTION INTRAMUSCULAR; INTRAVENOUS at 17:50

## 2023-10-01 RX ADMIN — PIPERACILLIN AND TAZOBACTAM 3375 MG: 3; .375 INJECTION, POWDER, LYOPHILIZED, FOR SOLUTION INTRAVENOUS at 01:45

## 2023-10-01 RX ADMIN — SODIUM CHLORIDE 2550 ML: 9 INJECTION, SOLUTION INTRAVENOUS at 15:26

## 2023-10-01 RX ADMIN — MORPHINE SULFATE 2 MG: 2 INJECTION, SOLUTION INTRAMUSCULAR; INTRAVENOUS at 11:03

## 2023-10-01 RX ADMIN — OXYCODONE AND ACETAMINOPHEN 1 TABLET: 5; 325 TABLET ORAL at 06:05

## 2023-10-01 RX ADMIN — ACETAMINOPHEN 650 MG: 325 TABLET ORAL at 11:53

## 2023-10-01 RX ADMIN — OXYCODONE AND ACETAMINOPHEN 1 TABLET: 5; 325 TABLET ORAL at 18:52

## 2023-10-01 RX ADMIN — MORPHINE SULFATE 2 MG: 2 INJECTION, SOLUTION INTRAMUSCULAR; INTRAVENOUS at 07:49

## 2023-10-01 RX ADMIN — ONDANSETRON 4 MG: 2 INJECTION INTRAMUSCULAR; INTRAVENOUS at 17:50

## 2023-10-01 RX ADMIN — ACETAMINOPHEN 650 MG: 325 TABLET ORAL at 06:09

## 2023-10-01 ASSESSMENT — PAIN DESCRIPTION - DESCRIPTORS
DESCRIPTORS: SHARP;ACHING
DESCRIPTORS: ACHING;SHARP
DESCRIPTORS: SHARP;ACHING
DESCRIPTORS: ACHING;DULL
DESCRIPTORS: ACHING;SHARP
DESCRIPTORS: ACHING
DESCRIPTORS: SHARP
DESCRIPTORS: SHARP

## 2023-10-01 ASSESSMENT — PAIN DESCRIPTION - FREQUENCY
FREQUENCY: CONTINUOUS

## 2023-10-01 ASSESSMENT — PAIN SCALES - WONG BAKER
WONGBAKER_NUMERICALRESPONSE: 0
WONGBAKER_NUMERICALRESPONSE: 0
WONGBAKER_NUMERICALRESPONSE: 2

## 2023-10-01 ASSESSMENT — PAIN DESCRIPTION - ORIENTATION
ORIENTATION: RIGHT;MID
ORIENTATION: RIGHT

## 2023-10-01 ASSESSMENT — PAIN DESCRIPTION - ONSET
ONSET: ON-GOING

## 2023-10-01 ASSESSMENT — PAIN SCALES - GENERAL
PAINLEVEL_OUTOF10: 8
PAINLEVEL_OUTOF10: 7
PAINLEVEL_OUTOF10: 6
PAINLEVEL_OUTOF10: 7
PAINLEVEL_OUTOF10: 6
PAINLEVEL_OUTOF10: 7
PAINLEVEL_OUTOF10: 6
PAINLEVEL_OUTOF10: 8
PAINLEVEL_OUTOF10: 8
PAINLEVEL_OUTOF10: 6
PAINLEVEL_OUTOF10: 7
PAINLEVEL_OUTOF10: 8
PAINLEVEL_OUTOF10: 7
PAINLEVEL_OUTOF10: 8
PAINLEVEL_OUTOF10: 8
PAINLEVEL_OUTOF10: 7
PAINLEVEL_OUTOF10: 6

## 2023-10-01 ASSESSMENT — PAIN DESCRIPTION - LOCATION
LOCATION: ABDOMEN

## 2023-10-01 ASSESSMENT — PAIN - FUNCTIONAL ASSESSMENT
PAIN_FUNCTIONAL_ASSESSMENT: PREVENTS OR INTERFERES SOME ACTIVE ACTIVITIES AND ADLS
PAIN_FUNCTIONAL_ASSESSMENT: PREVENTS OR INTERFERES WITH MANY ACTIVE NOT PASSIVE ACTIVITIES
PAIN_FUNCTIONAL_ASSESSMENT: PREVENTS OR INTERFERES SOME ACTIVE ACTIVITIES AND ADLS
PAIN_FUNCTIONAL_ASSESSMENT: ACTIVITIES ARE NOT PREVENTED
PAIN_FUNCTIONAL_ASSESSMENT: PREVENTS OR INTERFERES WITH MANY ACTIVE NOT PASSIVE ACTIVITIES
PAIN_FUNCTIONAL_ASSESSMENT: PREVENTS OR INTERFERES WITH MANY ACTIVE NOT PASSIVE ACTIVITIES
PAIN_FUNCTIONAL_ASSESSMENT: PREVENTS OR INTERFERES SOME ACTIVE ACTIVITIES AND ADLS
PAIN_FUNCTIONAL_ASSESSMENT: PREVENTS OR INTERFERES WITH ALL ACTIVE AND SOME PASSIVE ACTIVITIES

## 2023-10-01 ASSESSMENT — PAIN DESCRIPTION - PAIN TYPE
TYPE: SURGICAL PAIN
TYPE: SURGICAL PAIN

## 2023-10-01 NOTE — PROGRESS NOTES
Surg Pt seen may have developed infected hematoma post lap appy drain placed yest per IR  still with a lot pain  cont present Rx back down to full liq

## 2023-10-01 NOTE — FLOWSHEET NOTE
10/01/23 1500   Treatment Team Notification   Reason for Communication Review case; Change in status   Name of Team Member Notified [facility]   Treatment Team Role Physician Assistant   Method of Communication Secure Message   Response See orders         Mews score range has now decreased between 3 to 4. Attending provider notified, patient alert and oriented X4. Patient has decrease in oxygen levels, required 2L NC.  See orders

## 2023-10-01 NOTE — PROCEDURES
12 lead EKG completed. Results handed to [facility].  Sandy Osullivan CET

## 2023-10-01 NOTE — FLOWSHEET NOTE
10/01/23 1149   Treatment Team Notification   Reason for Communication Review case; Change in status   Name of Team Member Notified Todd Marley   Treatment Team Role Physician Assistant   Method of Communication Secure Message   Response See orders     MEW Score has decreased to 5 . Attending provider notified, patient alert and oriented at this time. VS stable. Patient complaints of abdominal pain, PRN med given.

## 2023-10-01 NOTE — PLAN OF CARE
Problem: Discharge Planning  Goal: Discharge to home or other facility with appropriate resources  Outcome: Progressing  Flowsheets   Discharge to home or other facility with appropriate resources:   Identify barriers to discharge with patient and caregiver   Arrange for needed discharge resources and transportation as appropriate  Note: Patient preference to discharge to home with family support. Problem: Pain  Goal: Verbalizes/displays adequate comfort level or baseline comfort level  Outcome: Progressing  Flowsheets   Verbalizes/displays adequate comfort level or baseline comfort level:   Encourage patient to monitor pain and request assistance   Assess pain using appropriate pain scale   Administer analgesics based on type and severity of pain and evaluate response  Note: Patient pain goal is 0/10. Rest and repositioning for non-medicinal measures. PRN tylenol administered with some effect     Problem: Safety - Adult  Goal: Free from fall injury  Outcome: Progressing  Flowsheets   Free From Fall Injury: Instruct family/caregiver on patient safety  Note: No falls noted this shift. Continue falling star program. Bed alarm on, bed in low position. Call light and personal belongings in reach. Patient uses call light appropriately. Care plan reviewed with patient. Patient verbalized understanding of the plan of care and contribute to goal setting.

## 2023-10-02 ENCOUNTER — APPOINTMENT (OUTPATIENT)
Dept: CT IMAGING | Age: 23
DRG: 862 | End: 2023-10-02

## 2023-10-02 ENCOUNTER — APPOINTMENT (OUTPATIENT)
Dept: ULTRASOUND IMAGING | Age: 23
DRG: 862 | End: 2023-10-02

## 2023-10-02 PROBLEM — T81.49XA POSTOPERATIVE ABSCESS: Status: ACTIVE | Noted: 2023-10-02

## 2023-10-02 LAB
ANION GAP SERPL CALC-SCNC: 14 MEQ/L (ref 8–16)
ANION GAP SERPL CALC-SCNC: 15 MEQ/L (ref 8–16)
BACTERIA: ABNORMAL
BASOPHILS ABSOLUTE: 0.1 THOU/MM3 (ref 0–0.1)
BASOPHILS NFR BLD AUTO: 0.4 %
BILIRUB UR QL STRIP: NEGATIVE
BUN SERPL-MCNC: 22 MG/DL (ref 7–22)
BUN SERPL-MCNC: 24 MG/DL (ref 7–22)
CALCIUM SERPL-MCNC: 8.2 MG/DL (ref 8.5–10.5)
CALCIUM SERPL-MCNC: 8.6 MG/DL (ref 8.5–10.5)
CASTS #/AREA URNS LPF: ABNORMAL /LPF
CASTS #/AREA URNS LPF: ABNORMAL /LPF
CHARACTER UR: CLEAR
CHARCOAL URNS QL MICRO: ABNORMAL
CHLORIDE SERPL-SCNC: 109 MEQ/L (ref 98–111)
CHLORIDE SERPL-SCNC: 109 MEQ/L (ref 98–111)
CO2 SERPL-SCNC: 15 MEQ/L (ref 23–33)
CO2 SERPL-SCNC: 17 MEQ/L (ref 23–33)
COLOR UR: YELLOW
CREAT SERPL-MCNC: 1.6 MG/DL (ref 0.4–1.2)
CREAT SERPL-MCNC: 2 MG/DL (ref 0.4–1.2)
CRYSTALS URNS QL MICRO: ABNORMAL
DEPRECATED RDW RBC AUTO: 45.5 FL (ref 35–45)
EKG ATRIAL RATE: 119 BPM
EKG P AXIS: 17 DEGREES
EKG P-R INTERVAL: 154 MS
EKG Q-T INTERVAL: 296 MS
EKG QRS DURATION: 90 MS
EKG QTC CALCULATION (BAZETT): 416 MS
EKG R AXIS: 66 DEGREES
EKG T AXIS: -12 DEGREES
EKG VENTRICULAR RATE: 119 BPM
EOSINOPHIL NFR BLD AUTO: 1.7 %
EOSINOPHILS ABSOLUTE: 0.3 THOU/MM3 (ref 0–0.4)
EPITHELIAL CELLS, UA: ABNORMAL /HPF
ERYTHROCYTE [DISTWIDTH] IN BLOOD BY AUTOMATED COUNT: 16.8 % (ref 11.5–14.5)
ERYTHROCYTE [SEDIMENTATION RATE] IN BLOOD BY WESTERGREN METHOD: 112 MM/HR (ref 0–20)
GFR SERPL CREATININE-BSD FRML MDRD: 36 ML/MIN/1.73M2
GFR SERPL CREATININE-BSD FRML MDRD: 47 ML/MIN/1.73M2
GLUCOSE SERPL-MCNC: 105 MG/DL (ref 70–108)
GLUCOSE SERPL-MCNC: 106 MG/DL (ref 70–108)
GLUCOSE UR QL STRIP.AUTO: NEGATIVE MG/DL
HCT VFR BLD AUTO: 26.5 % (ref 37–47)
HCT VFR BLD AUTO: 29 % (ref 37–47)
HGB BLD-MCNC: 8.4 GM/DL (ref 12–16)
HGB BLD-MCNC: 8.8 GM/DL (ref 12–16)
HGB UR QL STRIP.AUTO: ABNORMAL
IMM GRANULOCYTES # BLD AUTO: 0.18 THOU/MM3 (ref 0–0.07)
IMM GRANULOCYTES NFR BLD AUTO: 1.1 %
IRON SATN MFR SERPL: 5 % (ref 20–50)
IRON SERPL-MCNC: 12 UG/DL (ref 50–170)
KETONES UR QL STRIP.AUTO: NEGATIVE
LEUKOCYTE ESTERASE UR QL STRIP.AUTO: NEGATIVE
LYMPHOCYTES ABSOLUTE: 1.5 THOU/MM3 (ref 1–4.8)
LYMPHOCYTES NFR BLD AUTO: 9 %
MCH RBC QN AUTO: 23.9 PG (ref 26–33)
MCHC RBC AUTO-ENTMCNC: 31.7 GM/DL (ref 32.2–35.5)
MCV RBC AUTO: 75.5 FL (ref 81–99)
MONOCYTES ABSOLUTE: 1.5 THOU/MM3 (ref 0.4–1.3)
MONOCYTES NFR BLD AUTO: 8.7 %
NEUTROPHILS NFR BLD AUTO: 79.1 %
NITRITE UR QL STRIP.AUTO: NEGATIVE
NRBC BLD AUTO-RTO: 0 /100 WBC
OSMOLALITY UR: 424 MOSMOL/KG (ref 250–750)
PH UR STRIP.AUTO: 5 [PH] (ref 5–9)
PLATELET # BLD AUTO: 683 THOU/MM3 (ref 130–400)
PMV BLD AUTO: 9.8 FL (ref 9.4–12.4)
POTASSIUM SERPL-SCNC: 4.6 MEQ/L (ref 3.5–5.2)
POTASSIUM SERPL-SCNC: 5.2 MEQ/L (ref 3.5–5.2)
PROT UR STRIP.AUTO-MCNC: ABNORMAL MG/DL
RBC # BLD AUTO: 3.51 MILL/MM3 (ref 4.2–5.4)
RBC #/AREA URNS HPF: ABNORMAL /HPF
RENAL EPI CELLS #/AREA URNS HPF: ABNORMAL /[HPF]
SEGMENTED NEUTROPHILS ABSOLUTE COUNT: 13.5 THOU/MM3 (ref 1.8–7.7)
SODIUM SERPL-SCNC: 138 MEQ/L (ref 135–145)
SODIUM SERPL-SCNC: 141 MEQ/L (ref 135–145)
SODIUM UR-SCNC: 55 MEQ/L
SPECIFIC GRAVITY UA: 1.01 (ref 1–1.03)
TIBC SERPL-MCNC: 219 UG/DL (ref 171–450)
UROBILINOGEN, URINE: 0.2 EU/DL (ref 0–1)
WBC # BLD AUTO: 17.1 THOU/MM3 (ref 4.8–10.8)
WBC #/AREA URNS HPF: ABNORMAL /HPF
YEAST LIKE FUNGI URNS QL MICRO: ABNORMAL

## 2023-10-02 PROCEDURE — 85018 HEMOGLOBIN: CPT

## 2023-10-02 PROCEDURE — 6360000002 HC RX W HCPCS: Performed by: INTERNAL MEDICINE

## 2023-10-02 PROCEDURE — 2580000003 HC RX 258: Performed by: PHYSICIAN ASSISTANT

## 2023-10-02 PROCEDURE — 6370000000 HC RX 637 (ALT 250 FOR IP)

## 2023-10-02 PROCEDURE — 6360000002 HC RX W HCPCS: Performed by: PHYSICIAN ASSISTANT

## 2023-10-02 PROCEDURE — 85014 HEMATOCRIT: CPT

## 2023-10-02 PROCEDURE — 6360000002 HC RX W HCPCS

## 2023-10-02 PROCEDURE — 99233 SBSQ HOSP IP/OBS HIGH 50: CPT | Performed by: PHYSICIAN ASSISTANT

## 2023-10-02 PROCEDURE — 96376 TX/PRO/DX INJ SAME DRUG ADON: CPT

## 2023-10-02 PROCEDURE — 87086 URINE CULTURE/COLONY COUNT: CPT

## 2023-10-02 PROCEDURE — 84300 ASSAY OF URINE SODIUM: CPT

## 2023-10-02 PROCEDURE — 36415 COLL VENOUS BLD VENIPUNCTURE: CPT

## 2023-10-02 PROCEDURE — 1200000003 HC TELEMETRY R&B

## 2023-10-02 PROCEDURE — 6370000000 HC RX 637 (ALT 250 FOR IP): Performed by: PHYSICIAN ASSISTANT

## 2023-10-02 PROCEDURE — 80048 BASIC METABOLIC PNL TOTAL CA: CPT

## 2023-10-02 PROCEDURE — 96361 HYDRATE IV INFUSION ADD-ON: CPT

## 2023-10-02 PROCEDURE — 2580000003 HC RX 258: Performed by: INTERNAL MEDICINE

## 2023-10-02 PROCEDURE — 81001 URINALYSIS AUTO W/SCOPE: CPT

## 2023-10-02 PROCEDURE — 83935 ASSAY OF URINE OSMOLALITY: CPT

## 2023-10-02 PROCEDURE — 74176 CT ABD & PELVIS W/O CONTRAST: CPT

## 2023-10-02 PROCEDURE — 76770 US EXAM ABDO BACK WALL COMP: CPT

## 2023-10-02 PROCEDURE — 2580000003 HC RX 258

## 2023-10-02 PROCEDURE — 96366 THER/PROPH/DIAG IV INF ADDON: CPT

## 2023-10-02 RX ORDER — FERROUS SULFATE 325(65) MG
325 TABLET ORAL 2 TIMES DAILY WITH MEALS
Status: DISCONTINUED | OUTPATIENT
Start: 2023-10-02 | End: 2023-10-18 | Stop reason: HOSPADM

## 2023-10-02 RX ADMIN — ACETAMINOPHEN 650 MG: 325 TABLET ORAL at 03:20

## 2023-10-02 RX ADMIN — MORPHINE SULFATE 2 MG: 2 INJECTION, SOLUTION INTRAMUSCULAR; INTRAVENOUS at 01:14

## 2023-10-02 RX ADMIN — MORPHINE SULFATE 2 MG: 2 INJECTION, SOLUTION INTRAMUSCULAR; INTRAVENOUS at 11:34

## 2023-10-02 RX ADMIN — PIPERACILLIN AND TAZOBACTAM 3375 MG: 3; .375 INJECTION, POWDER, LYOPHILIZED, FOR SOLUTION INTRAVENOUS at 01:23

## 2023-10-02 RX ADMIN — MORPHINE SULFATE 2 MG: 2 INJECTION, SOLUTION INTRAMUSCULAR; INTRAVENOUS at 05:29

## 2023-10-02 RX ADMIN — MORPHINE SULFATE 2 MG: 2 INJECTION, SOLUTION INTRAMUSCULAR; INTRAVENOUS at 18:49

## 2023-10-02 RX ADMIN — FERROUS SULFATE TAB 325 MG (65 MG ELEMENTAL FE) 325 MG: 325 (65 FE) TAB at 17:22

## 2023-10-02 RX ADMIN — MORPHINE SULFATE 2 MG: 2 INJECTION, SOLUTION INTRAMUSCULAR; INTRAVENOUS at 08:37

## 2023-10-02 RX ADMIN — MORPHINE SULFATE 2 MG: 2 INJECTION, SOLUTION INTRAMUSCULAR; INTRAVENOUS at 15:21

## 2023-10-02 RX ADMIN — ONDANSETRON 4 MG: 2 INJECTION INTRAMUSCULAR; INTRAVENOUS at 15:54

## 2023-10-02 RX ADMIN — ACETAMINOPHEN 650 MG: 325 TABLET ORAL at 23:14

## 2023-10-02 RX ADMIN — PIPERACILLIN AND TAZOBACTAM 3375 MG: 3; .375 INJECTION, POWDER, LYOPHILIZED, FOR SOLUTION INTRAVENOUS at 17:25

## 2023-10-02 RX ADMIN — ONDANSETRON 4 MG: 2 INJECTION INTRAMUSCULAR; INTRAVENOUS at 21:38

## 2023-10-02 RX ADMIN — PIPERACILLIN AND TAZOBACTAM 3375 MG: 3; .375 INJECTION, POWDER, LYOPHILIZED, FOR SOLUTION INTRAVENOUS at 11:04

## 2023-10-02 RX ADMIN — SODIUM CHLORIDE: 9 INJECTION, SOLUTION INTRAVENOUS at 21:47

## 2023-10-02 RX ADMIN — POLYETHYLENE GLYCOL 3350 17 G: 17 POWDER, FOR SOLUTION ORAL at 09:15

## 2023-10-02 RX ADMIN — MORPHINE SULFATE 2 MG: 2 INJECTION, SOLUTION INTRAMUSCULAR; INTRAVENOUS at 21:38

## 2023-10-02 RX ADMIN — ACETAMINOPHEN 650 MG: 325 TABLET ORAL at 10:54

## 2023-10-02 RX ADMIN — ONDANSETRON 4 MG: 2 INJECTION INTRAMUSCULAR; INTRAVENOUS at 07:48

## 2023-10-02 RX ADMIN — SODIUM CHLORIDE: 9 INJECTION, SOLUTION INTRAVENOUS at 07:50

## 2023-10-02 RX ADMIN — ACETAMINOPHEN 650 MG: 325 TABLET ORAL at 17:59

## 2023-10-02 ASSESSMENT — PAIN DESCRIPTION - DESCRIPTORS
DESCRIPTORS: SHARP
DESCRIPTORS: THROBBING;TENDER
DESCRIPTORS: STABBING
DESCRIPTORS: TENDER;THROBBING
DESCRIPTORS: ACHING
DESCRIPTORS: STABBING
DESCRIPTORS: SHARP
DESCRIPTORS: SHARP
DESCRIPTORS: ACHING;THROBBING

## 2023-10-02 ASSESSMENT — PAIN DESCRIPTION - ORIENTATION
ORIENTATION: RIGHT
ORIENTATION: RIGHT;MID
ORIENTATION: RIGHT
ORIENTATION: RIGHT;MID
ORIENTATION: RIGHT
ORIENTATION: RIGHT;MID
ORIENTATION: RIGHT

## 2023-10-02 ASSESSMENT — PAIN DESCRIPTION - LOCATION
LOCATION: ABDOMEN

## 2023-10-02 ASSESSMENT — PAIN - FUNCTIONAL ASSESSMENT
PAIN_FUNCTIONAL_ASSESSMENT: ACTIVITIES ARE NOT PREVENTED

## 2023-10-02 ASSESSMENT — PAIN DESCRIPTION - FREQUENCY
FREQUENCY: CONTINUOUS
FREQUENCY: CONTINUOUS

## 2023-10-02 ASSESSMENT — PAIN SCALES - GENERAL
PAINLEVEL_OUTOF10: 7
PAINLEVEL_OUTOF10: 10
PAINLEVEL_OUTOF10: 6
PAINLEVEL_OUTOF10: 8
PAINLEVEL_OUTOF10: 6
PAINLEVEL_OUTOF10: 7
PAINLEVEL_OUTOF10: 8
PAINLEVEL_OUTOF10: 10
PAINLEVEL_OUTOF10: 6
PAINLEVEL_OUTOF10: 5
PAINLEVEL_OUTOF10: 7
PAINLEVEL_OUTOF10: 4
PAINLEVEL_OUTOF10: 9

## 2023-10-02 ASSESSMENT — PAIN DESCRIPTION - ONSET
ONSET: ON-GOING
ONSET: ON-GOING

## 2023-10-02 ASSESSMENT — PAIN DESCRIPTION - PAIN TYPE
TYPE: SURGICAL PAIN
TYPE: SURGICAL PAIN

## 2023-10-02 NOTE — PROGRESS NOTES
RN answered pts bathroom light. When RN was helping pt off the toilet, RN asked pt to attempt to pull up pants after wiping. Pt refused because her hand was contaminated from wiping. RN asked pt how she pulled up her pants at home after using the bathroom? Pt then continuously started stating \"I'm sorry, I'm sorry. \" And would not move. RN then stated to pt that she did not have to be sorry and asked what it was that she was sorry for. Pt just continued to state \"I'm sorry but would not respond to RN. RN then pulled up pts underwear and pants and asked pt to start walking over to the sink. RN was holding onto the side of pt. Pt would not move during this time. Another RN from the unit entered the room to assist this RN in being able to help pt. RN was able to get pt to the sink, however pt just stood there. RN asked assisting RN to grab a w/c to help pt get back to bed. Pt continued to say \"I'm sorry, I'm sorry\" but would not provided any other information as to what she was sorry about or answer any questions that staff asked.

## 2023-10-02 NOTE — CONSENT
Informed Consent for Blood Component Transfusion Note    I have discussed with the patient the rationale for blood component transfusion; its benefits in treating or preventing fatigue, organ damage, or death; and its risk which includes mild transfusion reactions, rare risk of blood borne infection, or more serious but rare reactions. I have discussed the alternatives to transfusion, including the risk and consequences of not receiving transfusion. The patient had an opportunity to ask questions and had agreed to proceed with transfusion of blood components.     Electronically signed by Osbaldo Rios PA-C on 10/1/23 at 8:25 PM EDT

## 2023-10-02 NOTE — PLAN OF CARE
Problem: Discharge Planning  Goal: Discharge to home or other facility with appropriate resources  10/2/2023 0008 by Ayden Barrow RN  Outcome: Progressing  Flowsheets (Taken 10/1/2023 2110)  Discharge to home or other facility with appropriate resources: Identify barriers to discharge with patient and caregiver  10/1/2023 1257 by Tutu Fatima RN  Outcome: Progressing  Flowsheets (Taken 10/1/2023 1254)  Discharge to home or other facility with appropriate resources:   Identify barriers to discharge with patient and caregiver   Arrange for needed discharge resources and transportation as appropriate     Problem: Pain  Goal: Verbalizes/displays adequate comfort level or baseline comfort level  10/2/2023 0008 by Ayden Barrow RN  Outcome: Progressing  Flowsheets  Taken 10/2/2023 0005  Verbalizes/displays adequate comfort level or baseline comfort level: Encourage patient to monitor pain and request assistance  Taken 10/1/2023 2021  Verbalizes/displays adequate comfort level or baseline comfort level: Encourage patient to monitor pain and request assistance  10/1/2023 1257 by Tutu Fatima RN  Outcome: Progressing     Problem: Safety - Adult  Goal: Free from fall injury  10/2/2023 0008 by Ayden Barrow RN  Outcome: Progressing  10/1/2023 1257 by Tutu Fatima RN  Outcome: Progressing     Problem: Skin/Tissue Integrity - Adult  Goal: Skin integrity remains intact  10/2/2023 0008 by Ayden Barrow RN  Outcome: Progressing  Flowsheets  Taken 10/1/2023 2110  Skin Integrity Remains Intact: Monitor for areas of redness and/or skin breakdown  Taken 10/1/2023 2021  Skin Integrity Remains Intact: Monitor for areas of redness and/or skin breakdown  10/1/2023 1257 by Tutu Fatima RN  Outcome: Progressing  Goal: Incisions, wounds, or drain sites healing without S/S of infection  10/2/2023 0008 by Ayden Barrow RN  Outcome: Progressing  Flowsheets  Taken 10/1/2023 2110  Incisions, Wounds, or Drain Sites

## 2023-10-02 NOTE — CONSULTS
CONSULTATION NOTE :ID       Patient - Louann Montalvo,  Age - 25 y.o.    - 2000      Room Number - 9R-28/168-J   N -  [id number]   [facility]  Date of Admission -  2023 12:11 AM  Patient's PCP: No primary care provider on file. Requesting Physician: Jennie Moreno PA-C    REASON FOR CONSULTATION   RLQ fluid collection w/ recurrent fevers s/p appendectomy  CHIEF COMPLAINT   Abdominal pain  HISTORY OF PRESENT ILLNESS     This is a very pleasant 25 y.o. adult w. PMHx of asthma, transgender F to M and migraines who was admitted to the hospital with a chief complaints of abdominal pain, fever and chills after appendectomy on 23. At the time on , pt had acute onset RLQ pain and was taken in for laproscopic appendectomy on the . He had elevated WBC POD1 improved on POD 2 and was discharged on . For last 2 days pt has been having fevers and chills, he has been taking oxycodone and ibuprofen for relief. CT AP in ED showed  multiloculated fluid collection with size of 6.7 cm x 8.8 cm x 5.9 cm in the lateral aspect of the cecum into R paracolic gutter. CT guided drainage was done in IR on  with drain placement. Repeat CT AP on 10/2 shows large fluid collection in the rectovaginal pouch measuring 10.2 x 7.3 x 11 cm in size. Pt has been on Zosyn since , and has received 3 doses of Vancomycin.      PAST MEDICAL  HISTORY       Past Medical History:   Diagnosis Date    Asthma     Migraine     Pneumonia        PAST SURGICAL HISTORY     Past Surgical History:   Procedure Laterality Date    LAPAROSCOPIC APPENDECTOMY N/A 2023    Laparoscopic Appendectomy performed by Dolly Reyes MD at [address] [place] [address]           MEDICATIONS:       Scheduled Meds:   sodium chloride flush  5-40 mL IntraVENous 2 times per day    [Held by provider] enoxaparin  40 mg SubCUTAneous Daily    piperacillin-tazobactam  3,375 mg

## 2023-10-02 NOTE — PROGRESS NOTES
Hospitalist Progress Note      Patient:  Elvia Saunders    Unit/Bed:6K-17/017-A  YOB: 2000  MRN: [id number]   Acct: [de-identified]   PCP: No primary care provider on file. Date of Admission: 9/30/2023    Assessment/Plan:    Sepsis secondary to postoperative abscess s/p appendectomy 9/21 with Dr. Shirley Jonas:   Consult to IR for aspiration drainage and drain placement (completed 9/30) - will repeat consult to see if can reach other fluid collection noted on repeat CT A/P. Discussed with ID who has been consulted as pt continues to have fevers  Consult to Dr. Rossana Fung to have fevers, Tylenol . Continue with Zosyn, follow final culture results of fluid studies / blood cx. NGTD. MRSA neg, stop Vanc  Leukocytosis improving, but still with fever/tachycardia, will initiate fluid bolus 30 cc / kg   Full liquid diet per Gen Surg - defer adv to their service. CARLITA: Cr 1.6 today (trending upward). Likely due to pre-renal causes, dehydration and poor PO intake in addition to some hypotension. Repeat BMP in am, avoid nephrotoxic agents. Check renal US. NAGMA: CO2 noted at 15,   Acute on chronic microcytic anemia: likely some acute blood loss contributing. Hgb noted at 7.4, discussed with Gen Surg who recommends 1 unit transfusion. Will monitor with CBC daily. Very low iron studies, will start ferrous sulfate and monitor. Cont to monitor and transfuse for Hgb < 7   Anxiety / depression / panic attacks / OCD: Psych consulted. Pt follows with a therapist. Milderd Medina to psych consult. Transgender male: taking sq testosterone weekly. Chief Complaint: Abdominal pain    Initial H and P:-    \"Patient is a 26-year-old transgender male, history of asthma and recent appendectomy on 9/21, presenting to ED with onset fevers, chills, abdominal pain in the past few days since surgery.   Patient seen by Dr. Shirley Jonas on 5/21, underwent that is within the larger loculated fluid collection. Separate anterior pelvic fluid collection has a J-shaped configuration measuring 10.5 cm in long axis and 5.7 cm in short axis. Stranding is seen surrounding the fluid collection which is clearly loculated and may reflect an abscess collection. Surgical clips are noted at the medial aspect of the cecum extending slightly posteriorly from recent appendectomy. There is multiloculated fluid contiguous with the lateral aspect of the cecum extending through the right paracolic gutter. This collection measures 9.2 cm in long axis craniocaudally 3.0 cm transversely. This collection is contiguous with the more rounded collection in the right upper quadrant measuring 6.7 cm x 8.8 cm x 5.9 cm. There is increased density centrally in the right upper quadrant fluid collection indicating component of hematoma/blood products. Right upper quadrant fluid collection is also contiguous with fluid extending into the anterior abdominal wall musculature anterolaterally. This collection extends through the muscle layers and into subcutaneous tissues through might have been prior port tract. No focal pathology through the liver, spleen, pancreas, adrenals, and right kidney. There is a cyst in the lower pole of the left kidney measuring 1.2 cm in diameter. Density changes in the gallbladder suggest layering sludge. No small bowel obstruction or obvious focal colitis. Small bowel in the left lower quadrant appears to have some mild surrounding stranding which may be reactive to the acute changes elsewhere in the abdomen and pelvis. Impression: 1. Multifocal abscess/hematoma postoperatively, see above for discussion. 2. Gallbladder sludge. 3. Nonspecific inflammation of the small bowel in the left lower quadrant.  This document has been electronically signed by: Kiera May MD on 09/30/2023 01:52 AM All CTs at this facility use dose modulation techniques and iterative

## 2023-10-03 ENCOUNTER — APPOINTMENT (OUTPATIENT)
Dept: CT IMAGING | Age: 23
DRG: 862 | End: 2023-10-03

## 2023-10-03 LAB
ANION GAP SERPL CALC-SCNC: 16 MEQ/L (ref 8–16)
BACTERIA SPEC AEROBE CULT: NORMAL
BACTERIA SPEC ANAEROBE CULT: NORMAL
BASOPHILS ABSOLUTE: 0 THOU/MM3 (ref 0–0.1)
BASOPHILS NFR BLD AUTO: 0.3 %
BUN SERPL-MCNC: 25 MG/DL (ref 7–22)
CALCIUM SERPL-MCNC: 8.6 MG/DL (ref 8.5–10.5)
CHLORIDE SERPL-SCNC: 111 MEQ/L (ref 98–111)
CO2 SERPL-SCNC: 17 MEQ/L (ref 23–33)
CREAT SERPL-MCNC: 1.9 MG/DL (ref 0.4–1.2)
DEPRECATED RDW RBC AUTO: 46.3 FL (ref 35–45)
EOSINOPHIL NFR BLD AUTO: 2.7 %
EOSINOPHILS ABSOLUTE: 0.3 THOU/MM3 (ref 0–0.4)
ERYTHROCYTE [DISTWIDTH] IN BLOOD BY AUTOMATED COUNT: 17 % (ref 11.5–14.5)
GFR SERPL CREATININE-BSD FRML MDRD: 38 ML/MIN/1.73M2
GLUCOSE SERPL-MCNC: 123 MG/DL (ref 70–108)
GRAM STN SPEC: NORMAL
HCT VFR BLD AUTO: 26.9 % (ref 37–47)
HGB BLD-MCNC: 8.1 GM/DL (ref 12–16)
IMM GRANULOCYTES # BLD AUTO: 0.25 THOU/MM3 (ref 0–0.07)
IMM GRANULOCYTES NFR BLD AUTO: 2 %
LYMPHOCYTES ABSOLUTE: 1.2 THOU/MM3 (ref 1–4.8)
LYMPHOCYTES NFR BLD AUTO: 9.2 %
MCH RBC QN AUTO: 23 PG (ref 26–33)
MCHC RBC AUTO-ENTMCNC: 30.1 GM/DL (ref 32.2–35.5)
MCV RBC AUTO: 76.4 FL (ref 81–99)
MONOCYTES ABSOLUTE: 1.2 THOU/MM3 (ref 0.4–1.3)
MONOCYTES NFR BLD AUTO: 9.2 %
NEUTROPHILS NFR BLD AUTO: 76.6 %
NRBC BLD AUTO-RTO: 0 /100 WBC
PLATELET # BLD AUTO: 779 THOU/MM3 (ref 130–400)
PMV BLD AUTO: 9.6 FL (ref 9.4–12.4)
POTASSIUM SERPL-SCNC: 5 MEQ/L (ref 3.5–5.2)
RBC # BLD AUTO: 3.52 MILL/MM3 (ref 4.2–5.4)
SEGMENTED NEUTROPHILS ABSOLUTE COUNT: 9.7 THOU/MM3 (ref 1.8–7.7)
SODIUM SERPL-SCNC: 144 MEQ/L (ref 135–145)
WBC # BLD AUTO: 12.6 THOU/MM3 (ref 4.8–10.8)

## 2023-10-03 PROCEDURE — 2580000003 HC RX 258: Performed by: INTERNAL MEDICINE

## 2023-10-03 PROCEDURE — 87205 SMEAR GRAM STAIN: CPT

## 2023-10-03 PROCEDURE — 36415 COLL VENOUS BLD VENIPUNCTURE: CPT

## 2023-10-03 PROCEDURE — 85025 COMPLETE CBC W/AUTO DIFF WBC: CPT

## 2023-10-03 PROCEDURE — 6360000002 HC RX W HCPCS

## 2023-10-03 PROCEDURE — 75989 ABSCESS DRAINAGE UNDER X-RAY: CPT

## 2023-10-03 PROCEDURE — 6370000000 HC RX 637 (ALT 250 FOR IP): Performed by: PHYSICIAN ASSISTANT

## 2023-10-03 PROCEDURE — 6360000002 HC RX W HCPCS: Performed by: INTERNAL MEDICINE

## 2023-10-03 PROCEDURE — 87102 FUNGUS ISOLATION CULTURE: CPT

## 2023-10-03 PROCEDURE — 6360000002 HC RX W HCPCS: Performed by: PHYSICIAN ASSISTANT

## 2023-10-03 PROCEDURE — 80048 BASIC METABOLIC PNL TOTAL CA: CPT

## 2023-10-03 PROCEDURE — 1200000003 HC TELEMETRY R&B

## 2023-10-03 PROCEDURE — 6370000000 HC RX 637 (ALT 250 FOR IP)

## 2023-10-03 PROCEDURE — 87070 CULTURE OTHR SPECIMN AEROBIC: CPT

## 2023-10-03 PROCEDURE — 2580000003 HC RX 258: Performed by: PHYSICIAN ASSISTANT

## 2023-10-03 PROCEDURE — 99233 SBSQ HOSP IP/OBS HIGH 50: CPT | Performed by: PHYSICIAN ASSISTANT

## 2023-10-03 PROCEDURE — 6360000002 HC RX W HCPCS: Performed by: RADIOLOGY

## 2023-10-03 PROCEDURE — 87075 CULTR BACTERIA EXCEPT BLOOD: CPT

## 2023-10-03 PROCEDURE — 94669 MECHANICAL CHEST WALL OSCILL: CPT

## 2023-10-03 PROCEDURE — C1769 GUIDE WIRE: HCPCS

## 2023-10-03 RX ORDER — MIDAZOLAM HYDROCHLORIDE 1 MG/ML
1 INJECTION INTRAMUSCULAR; INTRAVENOUS ONCE
Status: COMPLETED | OUTPATIENT
Start: 2023-10-03 | End: 2023-10-03

## 2023-10-03 RX ORDER — LORAZEPAM 2 MG/ML
1 INJECTION INTRAMUSCULAR ONCE
Status: COMPLETED | OUTPATIENT
Start: 2023-10-03 | End: 2023-10-03

## 2023-10-03 RX ORDER — FENTANYL CITRATE 50 UG/ML
50 INJECTION, SOLUTION INTRAMUSCULAR; INTRAVENOUS ONCE
Status: COMPLETED | OUTPATIENT
Start: 2023-10-03 | End: 2023-10-03

## 2023-10-03 RX ADMIN — SODIUM CHLORIDE: 9 INJECTION, SOLUTION INTRAVENOUS at 20:55

## 2023-10-03 RX ADMIN — FERROUS SULFATE TAB 325 MG (65 MG ELEMENTAL FE) 325 MG: 325 (65 FE) TAB at 09:28

## 2023-10-03 RX ADMIN — MORPHINE SULFATE 2 MG: 2 INJECTION, SOLUTION INTRAMUSCULAR; INTRAVENOUS at 11:53

## 2023-10-03 RX ADMIN — LORAZEPAM 1 MG: 2 INJECTION INTRAMUSCULAR; INTRAVENOUS at 20:52

## 2023-10-03 RX ADMIN — FENTANYL CITRATE 50 MCG: 50 INJECTION, SOLUTION INTRAMUSCULAR; INTRAVENOUS at 15:26

## 2023-10-03 RX ADMIN — SODIUM CHLORIDE: 9 INJECTION, SOLUTION INTRAVENOUS at 09:29

## 2023-10-03 RX ADMIN — MORPHINE SULFATE 2 MG: 2 INJECTION, SOLUTION INTRAMUSCULAR; INTRAVENOUS at 00:11

## 2023-10-03 RX ADMIN — FERROUS SULFATE TAB 325 MG (65 MG ELEMENTAL FE) 325 MG: 325 (65 FE) TAB at 18:26

## 2023-10-03 RX ADMIN — ONDANSETRON 4 MG: 2 INJECTION INTRAMUSCULAR; INTRAVENOUS at 18:21

## 2023-10-03 RX ADMIN — MIDAZOLAM 1 MG: 1 INJECTION INTRAMUSCULAR; INTRAVENOUS at 15:50

## 2023-10-03 RX ADMIN — ACETAMINOPHEN 650 MG: 325 TABLET ORAL at 11:54

## 2023-10-03 RX ADMIN — PIPERACILLIN AND TAZOBACTAM 3375 MG: 3; .375 INJECTION, POWDER, LYOPHILIZED, FOR SOLUTION INTRAVENOUS at 01:35

## 2023-10-03 RX ADMIN — FENTANYL CITRATE 50 MCG: 50 INJECTION, SOLUTION INTRAMUSCULAR; INTRAVENOUS at 15:37

## 2023-10-03 RX ADMIN — MIDAZOLAM 1 MG: 1 INJECTION INTRAMUSCULAR; INTRAVENOUS at 15:26

## 2023-10-03 RX ADMIN — ACETAMINOPHEN 650 MG: 325 TABLET ORAL at 05:29

## 2023-10-03 RX ADMIN — PIPERACILLIN AND TAZOBACTAM 3375 MG: 3; .375 INJECTION, POWDER, LYOPHILIZED, FOR SOLUTION INTRAVENOUS at 18:25

## 2023-10-03 RX ADMIN — ONDANSETRON 4 MG: 2 INJECTION INTRAMUSCULAR; INTRAVENOUS at 05:11

## 2023-10-03 RX ADMIN — ONDANSETRON 4 MG: 2 INJECTION INTRAMUSCULAR; INTRAVENOUS at 11:54

## 2023-10-03 RX ADMIN — PIPERACILLIN AND TAZOBACTAM 3375 MG: 3; .375 INJECTION, POWDER, LYOPHILIZED, FOR SOLUTION INTRAVENOUS at 09:28

## 2023-10-03 RX ADMIN — MIDAZOLAM 1 MG: 1 INJECTION INTRAMUSCULAR; INTRAVENOUS at 15:56

## 2023-10-03 RX ADMIN — MORPHINE SULFATE 2 MG: 2 INJECTION, SOLUTION INTRAMUSCULAR; INTRAVENOUS at 21:23

## 2023-10-03 RX ADMIN — MORPHINE SULFATE 2 MG: 2 INJECTION, SOLUTION INTRAMUSCULAR; INTRAVENOUS at 18:21

## 2023-10-03 RX ADMIN — FENTANYL CITRATE 50 MCG: 50 INJECTION, SOLUTION INTRAMUSCULAR; INTRAVENOUS at 15:50

## 2023-10-03 RX ADMIN — MIDAZOLAM 1 MG: 1 INJECTION INTRAMUSCULAR; INTRAVENOUS at 15:37

## 2023-10-03 RX ADMIN — ACETAMINOPHEN 650 MG: 650 SUPPOSITORY RECTAL at 18:14

## 2023-10-03 RX ADMIN — FENTANYL CITRATE 50 MCG: 50 INJECTION, SOLUTION INTRAMUSCULAR; INTRAVENOUS at 15:56

## 2023-10-03 RX ADMIN — MORPHINE SULFATE 2 MG: 2 INJECTION, SOLUTION INTRAMUSCULAR; INTRAVENOUS at 04:28

## 2023-10-03 RX ADMIN — MORPHINE SULFATE 2 MG: 2 INJECTION, SOLUTION INTRAMUSCULAR; INTRAVENOUS at 07:58

## 2023-10-03 ASSESSMENT — PAIN DESCRIPTION - ORIENTATION
ORIENTATION: RIGHT
ORIENTATION: RIGHT;LOWER
ORIENTATION: RIGHT

## 2023-10-03 ASSESSMENT — PAIN DESCRIPTION - DESCRIPTORS
DESCRIPTORS: DISCOMFORT
DESCRIPTORS: STABBING
DESCRIPTORS: STABBING;SHARP
DESCRIPTORS: DISCOMFORT
DESCRIPTORS: SHARP

## 2023-10-03 ASSESSMENT — PAIN SCALES - GENERAL
PAINLEVEL_OUTOF10: 8
PAINLEVEL_OUTOF10: 8
PAINLEVEL_OUTOF10: 7
PAINLEVEL_OUTOF10: 9
PAINLEVEL_OUTOF10: 8
PAINLEVEL_OUTOF10: 7
PAINLEVEL_OUTOF10: 7
PAINLEVEL_OUTOF10: 6
PAINLEVEL_OUTOF10: 10

## 2023-10-03 ASSESSMENT — PAIN DESCRIPTION - LOCATION
LOCATION: ABDOMEN
LOCATION: ABDOMEN
LOCATION: BUTTOCKS;ABDOMEN
LOCATION: ABDOMEN
LOCATION: BUTTOCKS
LOCATION: ABDOMEN

## 2023-10-03 ASSESSMENT — PAIN DESCRIPTION - PAIN TYPE
TYPE: SURGICAL PAIN
TYPE: SURGICAL PAIN

## 2023-10-03 ASSESSMENT — PAIN DESCRIPTION - ONSET
ONSET: AWAKENED FROM SLEEP
ONSET: AWAKENED FROM SLEEP

## 2023-10-03 ASSESSMENT — PAIN DESCRIPTION - FREQUENCY
FREQUENCY: CONTINUOUS
FREQUENCY: CONTINUOUS

## 2023-10-03 ASSESSMENT — PAIN - FUNCTIONAL ASSESSMENT
PAIN_FUNCTIONAL_ASSESSMENT: PREVENTS OR INTERFERES SOME ACTIVE ACTIVITIES AND ADLS
PAIN_FUNCTIONAL_ASSESSMENT: ACTIVITIES ARE NOT PREVENTED

## 2023-10-03 ASSESSMENT — PAIN SCALES - WONG BAKER: WONGBAKER_NUMERICALRESPONSE: 8

## 2023-10-03 NOTE — PROCEDURES
Department of Radiology  Post Procedure Progress Note      Pre-Procedure Diagnosis:  pelvic hematoma/abscess    Procedure Performed:  CT guided drainge     Anesthesia: local / versed and fentanyl    Findings: successful, 500 ml frankly bloody fluid     Immediate Complications:  None    Estimated Blood Loss: minimal    SEE DICTATED PROCEDURE NOTE FOR COMPLETE DETAILS.     Lindsey Teixeira MD   10/3/2023 4:17 PM

## 2023-10-03 NOTE — PROGRESS NOTES
1503 Pt arrived to CT for CT guided abscess drain placement under conscious sedation. No family present. 1504 Procedure explained using teach back method. Pt states understanding. [address] Billie Alexis into assess patient and explain procedure. This procedure has been fully reviewed with the patient and written informed consent has been obtained. 1523 Patient assisted to table in prone position. Monitor attached to patient. Comfort ensured. 1526 Pre-procedure images obtained. 1537 Procedure begins. [id number] Procedure complete. Samples obtained and sent to lab for analysis. Post-procedure images obtained. 1416 Monitor removed. Patient assisted to bed in semi fowlers position. Comfort ensured. 1623 Patient transported to  in stable condition.
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
## 2023-10-03 NOTE — FLOWSHEET NOTE
10/03/23 1332   Safe Environment   Safety Measures Bed in low position;Call light within reach; Fall prevention (comment); Side rails X 2;Standard Safety Measures  (Virtual nurse safety round completed.)     Patient is awake and  alert and answers questions. No distress noted. Referred patient to page 13 of the handbook for fall prevention review. Educated on call light use. Call light in reach.

## 2023-10-03 NOTE — FLOWSHEET NOTE
10/03/23 1057   Safe Environment   Safety Measures Bed in low position;Call light within reach; Side rails X 2;Caregiver at bedside;Standard Safety Measures  (Virtual nurse safety round completed.)     Patient is awake and  alert and answers questions. No distress noted.

## 2023-10-03 NOTE — PROGRESS NOTES
achievable. CT images were initially obtained to determine appropriate puncture site. The skin was marked, prepped, and draped in a sterile fashion. Following local anesthesia and utilizing aseptic technique, a needle was successfully passed into the abscess pocket. A small amount of fluid was aspirated to confirm appropriate needle position. A guidewire was then passed through the needle followed by insertion of progressively larger dilators up to an 8 [place] size. An 8 [place] multi-side-hole drainage catheter was  then passed over the wire and was coiled within the abscess pocket. The retaining suture was then locked in the standard fashion. Catheter was then hooked to a Des-Close drainage bag and the catheter was flushed several times with sterile saline. The catheter was stabilized to the skin with a Percu-Stay device. A sample of the fluid was sent to laboratory for culture and sensitivity testing. Status post successful abscess drainage procedure. . **This report has been created using voice recognition software. It may contain minor errors which are inherent in voice recognition technology. ** Final report electronically signed by Dr Ramírez Moody on 9/30/2023 3:23 PM    CT ABDOMEN PELVIS W IV CONTRAST Additional Contrast? None    Result Date: 9/30/2023   ADDENDUM #1  This report was discussed with Erika Gaxiola on Sep 30, 2023 01:40:00 EDT. This document has been electronically signed by: Alyssa Alvarado on 09/30/2023 02:20 AM  ORIGINAL REPORT  CT abdomen and pelvis with contrast Comparison: 09/21/2023 Findings: There has been interval appendectomy. Large loculated fluid collections are now evident in the pelvis and within the right side of the abdomen. The largest collection is posterior to the uterus and may have engulfed the right ovary. The large collection has increased density inferiorly indicating blood products.  This loculated collection has a narrow channel fluid on the left superiorly that electronically signed by: Opal Merritt MD on 09/30/2023 01:52 AM All CTs at this facility use dose modulation techniques and iterative reconstructions, and/or weight-based dosing when appropriate to reduce radiation to a low as reasonably achievable.       Electronically signed by Magui Durand PA-C on 10/3/2023 at 2:01 PM

## 2023-10-03 NOTE — CONSENT
Formulation and discussion of sedation / procedure plans, risks, benefits, side effects and alternatives with patient and/or responsible adult completed. History and Physical reviewed and unchanged.     Electronically signed by Blake Morrison MD on 10/3/23 at 3:11 PM EDT

## 2023-10-03 NOTE — PLAN OF CARE
Problem: Discharge Planning  Goal: Discharge to home or other facility with appropriate resources  10/3/2023 0251 by Gurjit Rooney RN  Outcome: Progressing  Flowsheets (Taken 10/3/2023 0251)  Discharge to home or other facility with appropriate resources:   Identify barriers to discharge with patient and caregiver   Identify discharge learning needs (meds, wound care, etc)   Refer to discharge planning if patient needs post-hospital services based on physician order or complex needs related to functional status, cognitive ability or social support system   Arrange for needed discharge resources and transportation as appropriate     Problem: Pain  Goal: Verbalizes/displays adequate comfort level or baseline comfort level  10/3/2023 0251 by Gurjit Rooney RN  Outcome: Progressing  Flowsheets (Taken 10/3/2023 0251)  Verbalizes/displays adequate comfort level or baseline comfort level:   Encourage patient to monitor pain and request assistance   Assess pain using appropriate pain scale   Administer analgesics based on type and severity of pain and evaluate response   Implement non-pharmacological measures as appropriate and evaluate response   Consider cultural and social influences on pain and pain management     Problem: Safety - Adult  Goal: Free from fall injury  10/3/2023 0251 by Gurjit Rooney RN  Outcome: Progressing  Flowsheets (Taken 10/3/2023 0251)  Free From Fall Injury:   Based on caregiver fall risk screen, instruct family/caregiver to ask for assistance with transferring infant if caregiver noted to have fall risk factors   Instruct family/caregiver on patient safety     Problem: Skin/Tissue Integrity - Adult  Goal: Skin integrity remains intact  10/3/2023 0251 by Gurjit Rooney RN  Outcome: Progressing  Flowsheets (Taken 10/3/2023 0250)  Skin Integrity Remains Intact:   Monitor for areas of redness and/or skin breakdown   Assess vascular access sites hourly     Problem: Skin/Tissue

## 2023-10-03 NOTE — PROGRESS NOTES
[address]  Sedation/Analgesia History & Physical    Pt Name: Fidencio Pineda  MRN: [id number]  YOB: 2000  Provider Performing Procedure: Luisana Randolph MD, MD  Primary Care Physician: No primary care provider on file. Formulation and discussion of sedation / procedure plans, risks, benefits, side effects and alternatives with patient and/or responsible adult completed. PRE-PROCEDURE   DNR-CCA/DNR-CC []Yes [x]No  Brief History/Pre-Procedure Diagnosis: abscess          MEDICAL HISTORY  []CAD/Valve  []Liver Disease  []Lung Disease []Diabetes  []Hypertension []Renal Disease  []Additional information:       has a past medical history of Asthma, Migraine, and Pneumonia. SURGICAL HISTORY   has a past surgical history that includes [place] tooth extraction (2019) and laparoscopic appendectomy (N/A, 9/21/2023).   Additional information:       ALLERGIES   Allergies as of 09/30/2023 - Fully Reviewed 09/30/2023   Allergen Reaction Noted    Latex Itching 09/21/2023     Additional information:       MEDICATIONS   Coumadin Use Last 5 Days [x]No []Yes  Antiplatelet drug therapy use last 5 days  []No []Yes  Other anticoagulant use last 5 days  [x]No []Yes    Current Facility-Administered Medications:     midazolam (VERSED) injection 1 mg, 1 mg, IntraVENous, Once, Ana Maria Ponce MD    fentaNYL (SUBLIMAZE) injection 50 mcg, 50 mcg, IntraVENous, Once, Ana Maria Ponce MD    ferrous sulfate (IRON 325) tablet 325 mg, 325 mg, Oral, BID JAN, Jane Day PA-C, 325 mg at 10/03/23 6882    [Held by provider] ibuprofen (ADVIL;MOTRIN) tablet 400 mg, 400 mg, Oral, Q6H PRN, Mojgan Strong PA-C    0.9 % sodium chloride infusion, , IntraVENous, PRN, Mojgan Strong PA-C    sodium chloride flush 0.9 % injection 5-40 mL, 5-40 mL, IntraVENous, 2 times per day, Josh Bravo MD, 10 mL at 10/01/23 2111    sodium chloride flush 0.9 % injection 5-40 mL, 5-40 mL, IntraVENous, PRN, Josh Bravo MD    0.9 %

## 2023-10-03 NOTE — CARE COORDINATION
10/3/23, 8:46 AM EDT    DISCHARGE  [address] day: 1  Location: [facility]17/017-A Reason for admit: Elevated C-reactive protein (CRP) [R79.82]  Postoperative intra-abdominal abscess [T81.43XA]  Abscess of abdominal cavity (HCC) [K65.1]  SIRS (systemic inflammatory response syndrome) (HCC) [R65.10]  Leukocytosis, unspecified type [D72.829]  Infection of deep incisional surgical site after procedure, initial encounter [T81.42XA]  Postoperative abscess [T81.49XA]   Procedure: 9/30 CT guided drain placement  10/2 CTA abd: Large fluid collection in the rectovaginal pouch measuring 10.2 x 7.3 x 11 cm in size. Status post appendectomy. Drain in the right lower quadrant. Fluid in the right lower quadrant measuring 6.8 x 4 cm in size  Barriers to Discharge: creat 1.9, WBC 12.6, Hgb 8.1. Tmax/24 hr 102.6 . IVF, IV zosyn, pain control. General surgery and ID following, may need IR to place a new drain. Psych consult pending. PCP: No primary care provider on file. Readmission Risk Score: 11.3%  Patient Goals/Plan/Treatment Preferences: Home with roommates.

## 2023-10-04 LAB
ANION GAP SERPL CALC-SCNC: 10 MEQ/L (ref 8–16)
BACTERIA UR CULT: NORMAL
BUN SERPL-MCNC: 14 MG/DL (ref 7–22)
CALCIUM SERPL-MCNC: 8.3 MG/DL (ref 8.5–10.5)
CHLORIDE SERPL-SCNC: 110 MEQ/L (ref 98–111)
CO2 SERPL-SCNC: 22 MEQ/L (ref 23–33)
CREAT SERPL-MCNC: 1.3 MG/DL (ref 0.4–1.2)
DEPRECATED RDW RBC AUTO: 49.4 FL (ref 35–45)
ERYTHROCYTE [DISTWIDTH] IN BLOOD BY AUTOMATED COUNT: 17.5 % (ref 11.5–14.5)
GFR SERPL CREATININE-BSD FRML MDRD: 60 ML/MIN/1.73M2
GLUCOSE SERPL-MCNC: 114 MG/DL (ref 70–108)
HCT VFR BLD AUTO: 26.2 % (ref 37–47)
HGB BLD-MCNC: 7.8 GM/DL (ref 12–16)
MCH RBC QN AUTO: 23.4 PG (ref 26–33)
MCHC RBC AUTO-ENTMCNC: 29.8 GM/DL (ref 32.2–35.5)
MCV RBC AUTO: 78.7 FL (ref 81–99)
PLATELET # BLD AUTO: 768 THOU/MM3 (ref 130–400)
PMV BLD AUTO: 9.9 FL (ref 9.4–12.4)
POTASSIUM SERPL-SCNC: 4.5 MEQ/L (ref 3.5–5.2)
RBC # BLD AUTO: 3.33 MILL/MM3 (ref 4.2–5.4)
SODIUM SERPL-SCNC: 142 MEQ/L (ref 135–145)
WBC # BLD AUTO: 11.8 THOU/MM3 (ref 4.8–10.8)

## 2023-10-04 PROCEDURE — 36415 COLL VENOUS BLD VENIPUNCTURE: CPT

## 2023-10-04 PROCEDURE — 6360000002 HC RX W HCPCS

## 2023-10-04 PROCEDURE — 2580000003 HC RX 258: Performed by: PHYSICIAN ASSISTANT

## 2023-10-04 PROCEDURE — 6370000000 HC RX 637 (ALT 250 FOR IP): Performed by: PHYSICIAN ASSISTANT

## 2023-10-04 PROCEDURE — 85027 COMPLETE CBC AUTOMATED: CPT

## 2023-10-04 PROCEDURE — 6370000000 HC RX 637 (ALT 250 FOR IP)

## 2023-10-04 PROCEDURE — 2580000003 HC RX 258: Performed by: INTERNAL MEDICINE

## 2023-10-04 PROCEDURE — 6360000002 HC RX W HCPCS: Performed by: PHYSICIAN ASSISTANT

## 2023-10-04 PROCEDURE — 2060000000 HC ICU INTERMEDIATE R&B

## 2023-10-04 PROCEDURE — 80048 BASIC METABOLIC PNL TOTAL CA: CPT

## 2023-10-04 PROCEDURE — 6360000002 HC RX W HCPCS: Performed by: INTERNAL MEDICINE

## 2023-10-04 PROCEDURE — 99232 SBSQ HOSP IP/OBS MODERATE 35: CPT

## 2023-10-04 RX ORDER — LINEZOLID 2 MG/ML
600 INJECTION, SOLUTION INTRAVENOUS EVERY 12 HOURS
Status: DISCONTINUED | OUTPATIENT
Start: 2023-10-04 | End: 2023-10-18 | Stop reason: HOSPADM

## 2023-10-04 RX ORDER — ACETAMINOPHEN 325 MG/1
650 TABLET ORAL EVERY 4 HOURS PRN
Status: DISCONTINUED | OUTPATIENT
Start: 2023-10-04 | End: 2023-10-18 | Stop reason: HOSPADM

## 2023-10-04 RX ORDER — ACETAMINOPHEN 650 MG/1
650 SUPPOSITORY RECTAL EVERY 4 HOURS PRN
Status: DISCONTINUED | OUTPATIENT
Start: 2023-10-04 | End: 2023-10-18 | Stop reason: HOSPADM

## 2023-10-04 RX ORDER — KETOROLAC TROMETHAMINE 30 MG/ML
15 INJECTION, SOLUTION INTRAMUSCULAR; INTRAVENOUS ONCE
Status: COMPLETED | OUTPATIENT
Start: 2023-10-04 | End: 2023-10-04

## 2023-10-04 RX ADMIN — ONDANSETRON 4 MG: 4 TABLET, ORALLY DISINTEGRATING ORAL at 15:29

## 2023-10-04 RX ADMIN — MORPHINE SULFATE 2 MG: 2 INJECTION, SOLUTION INTRAMUSCULAR; INTRAVENOUS at 09:38

## 2023-10-04 RX ADMIN — PIPERACILLIN AND TAZOBACTAM 3375 MG: 3; .375 INJECTION, POWDER, LYOPHILIZED, FOR SOLUTION INTRAVENOUS at 01:03

## 2023-10-04 RX ADMIN — MORPHINE SULFATE 2 MG: 2 INJECTION, SOLUTION INTRAMUSCULAR; INTRAVENOUS at 21:17

## 2023-10-04 RX ADMIN — MORPHINE SULFATE 2 MG: 2 INJECTION, SOLUTION INTRAMUSCULAR; INTRAVENOUS at 03:28

## 2023-10-04 RX ADMIN — ACETAMINOPHEN 650 MG: 650 SUPPOSITORY RECTAL at 03:35

## 2023-10-04 RX ADMIN — ACETAMINOPHEN 650 MG: 650 SUPPOSITORY RECTAL at 00:37

## 2023-10-04 RX ADMIN — FERROUS SULFATE TAB 325 MG (65 MG ELEMENTAL FE) 325 MG: 325 (65 FE) TAB at 07:46

## 2023-10-04 RX ADMIN — ONDANSETRON 4 MG: 2 INJECTION INTRAMUSCULAR; INTRAVENOUS at 23:35

## 2023-10-04 RX ADMIN — MORPHINE SULFATE 2 MG: 2 INJECTION, SOLUTION INTRAMUSCULAR; INTRAVENOUS at 06:30

## 2023-10-04 RX ADMIN — LINEZOLID 600 MG: 600 INJECTION, SOLUTION INTRAVENOUS at 15:45

## 2023-10-04 RX ADMIN — FERROUS SULFATE TAB 325 MG (65 MG ELEMENTAL FE) 325 MG: 325 (65 FE) TAB at 16:27

## 2023-10-04 RX ADMIN — KETOROLAC TROMETHAMINE 15 MG: 30 INJECTION, SOLUTION INTRAMUSCULAR; INTRAVENOUS at 15:38

## 2023-10-04 RX ADMIN — PIPERACILLIN AND TAZOBACTAM 3375 MG: 3; .375 INJECTION, POWDER, LYOPHILIZED, FOR SOLUTION INTRAVENOUS at 18:28

## 2023-10-04 RX ADMIN — ONDANSETRON 4 MG: 2 INJECTION INTRAMUSCULAR; INTRAVENOUS at 05:33

## 2023-10-04 RX ADMIN — SODIUM CHLORIDE: 9 INJECTION, SOLUTION INTRAVENOUS at 20:16

## 2023-10-04 RX ADMIN — ACETAMINOPHEN 650 MG: 325 TABLET ORAL at 20:10

## 2023-10-04 RX ADMIN — ACETAMINOPHEN 650 MG: 325 TABLET ORAL at 15:30

## 2023-10-04 RX ADMIN — MORPHINE SULFATE 2 MG: 2 INJECTION, SOLUTION INTRAMUSCULAR; INTRAVENOUS at 00:26

## 2023-10-04 RX ADMIN — PIPERACILLIN AND TAZOBACTAM 3375 MG: 3; .375 INJECTION, POWDER, LYOPHILIZED, FOR SOLUTION INTRAVENOUS at 09:08

## 2023-10-04 RX ADMIN — ACETAMINOPHEN 650 MG: 325 TABLET ORAL at 11:30

## 2023-10-04 ASSESSMENT — PAIN DESCRIPTION - ORIENTATION: ORIENTATION: RIGHT

## 2023-10-04 ASSESSMENT — PAIN SCALES - GENERAL
PAINLEVEL_OUTOF10: 7
PAINLEVEL_OUTOF10: 8
PAINLEVEL_OUTOF10: 7
PAINLEVEL_OUTOF10: 7
PAINLEVEL_OUTOF10: 3
PAINLEVEL_OUTOF10: 7
PAINLEVEL_OUTOF10: 8
PAINLEVEL_OUTOF10: 7

## 2023-10-04 ASSESSMENT — PAIN DESCRIPTION - DESCRIPTORS
DESCRIPTORS: SHARP
DESCRIPTORS: SHARP;STABBING

## 2023-10-04 ASSESSMENT — PAIN - FUNCTIONAL ASSESSMENT
PAIN_FUNCTIONAL_ASSESSMENT: PREVENTS OR INTERFERES SOME ACTIVE ACTIVITIES AND ADLS

## 2023-10-04 ASSESSMENT — PAIN DESCRIPTION - PAIN TYPE
TYPE: SURGICAL PAIN
TYPE: ACUTE PAIN;SURGICAL PAIN

## 2023-10-04 ASSESSMENT — PAIN DESCRIPTION - FREQUENCY
FREQUENCY: CONTINUOUS

## 2023-10-04 ASSESSMENT — PAIN DESCRIPTION - LOCATION
LOCATION: GENERALIZED
LOCATION: ABDOMEN;BUTTOCKS
LOCATION: ABDOMEN;BACK

## 2023-10-04 ASSESSMENT — PAIN DESCRIPTION - ONSET
ONSET: ON-GOING

## 2023-10-04 NOTE — PLAN OF CARE
Problem: Pain  Goal: Verbalizes/displays adequate comfort level or baseline comfort level  Flowsheets (Taken 10/3/2023 2119)  Verbalizes/displays adequate comfort level or baseline comfort level: Encourage patient to monitor pain and request assistance

## 2023-10-04 NOTE — PROGRESS NOTES
Surg patient had a drain placed in the pelvic abscess when examined patient is having a lot of pain particularly in his leg really will let me touch the abdomen as he just does not want movement positioning nurses are at the bedside giving pain medication white blood cell count has been decreased continue to monitor

## 2023-10-04 NOTE — CARE COORDINATION
10/4/23, 7:12 AM EDT    DISCHARGE  [address] day: 2  Location: -17/017-A Reason for admit: Elevated C-reactive protein (CRP) [R79.82]  Postoperative intra-abdominal abscess [T81.43XA]  Abscess of abdominal cavity (HCC) [K65.1]  SIRS (systemic inflammatory response syndrome) (HCC) [R65.10]  Leukocytosis, unspecified type [D72.829]  Infection of deep incisional surgical site after procedure, initial encounter [T81.42XA]  Postoperative abscess [T81.49XA]   Procedure: 9/30 CT guided drain placement  10/2 CTA abd: Large fluid collection in the rectovaginal pouch measuring 10.2 x 7.3 x 11 cm in size. Status post appendectomy. Drain in the right lower quadrant. Fluid in the right lower quadrant measuring 6.8 x 4 cm in size  10/3 CT guided drainage of pelvic hematoma/abscess  Barriers to Discharge: WBC 11.8, Hgb 7.8, plt 768. Tmax/24 hr 104.5. . Using 0.5L O2, 92%. IVF, IV zosyn, pain control. Gen surgery, ID, and psych following. Cooling blanket and transfer to  ordered. PCP: No primary care provider on file. Readmission Risk Score: 12.7%  Patient Goals/Plan/Treatment Preferences: Home with roommates. [facility], appointment will need scheduled.

## 2023-10-04 NOTE — PROGRESS NOTES
Patient enrolled in [address] [place] [address], please send discharge medication to [address] [place] [address]. Thank you!  Herminia Solano [facility] - Prescription Assistance (ext. [phone]) 10/4/2023,10:34 AM

## 2023-10-04 NOTE — PROGRESS NOTES
Hospitalist Progress Note      Patient:  Berhane Duenas    Unit/Bed:4K-27/027-A  YOB: 2000  MRN: [id number]   Acct: [de-identified]   PCP: No primary care provider on file. Date of Admission: 9/30/2023    Assessment/Plan:  Sepsis secondary to postoperative abscess s/p appendectomy 9/21 with Dr. Egan Vanesa: Consult to IR for aspiration drainage and drain placement (completed 9/30) - will repeat consult to see if can reach other fluid collection noted on repeat CT A/P. Still spikin fevers, discussed with ID. Second drain placed posteriorly with 500 mL bloody fluid drained on 10/3. MRSA negative, vancomycin stopped. Continued on Zosyn, discussed with ID due to persistent fevers/tachycardia will add Zyvox. Continue to follow cultures. Full liquid diet per general surgery. Advance per their recommendation. CARLITA, improved: Creatinine elevated at 2.0 on 10/2. Likely due to pre-renal causes, dehydration and poor PO intake in addition to some hypotension. Also consideration for Vanc dosing initially. On continuous IV fluids. Creatinine improved to 1.3. Monitor with BMP, avoid nephrotoxic agents, renally dose medications. NAGMA: CO2 noted at 15 on 10/1, likely related to renal dysfunction. Daily BMP. Continue IVFs. Serum bicarb improved to 22  Acute on chronic microcytic anemia: likely some acute blood loss contributing. Hgb noted at 7.4 on 10/1, discussed with Gen Surg who recommends 1 unit transfusion. Will monitor with CBC daily. Very low iron studies, will start ferrous sulfate and monitor. Cont to monitor and transfuse for Hgb < 7. Hemoglobin stable at 7.8 this morning. Lower L kidney abnormality: noted on US which recommended CT. CT was done on admission and there is similar finding. Recommend continued surveillance. F/u with PCP. Anxiety / depression / panic attacks / OCD: Psych consulted.  Pt follows with a therapist.

## 2023-10-04 NOTE — FLOWSHEET NOTE
10/04/23 [facility]   Safe Environment   Safety Measures Standard Safety Measures;Call light within reach; Bed in low position  (virtual safety round)     Pt resting in bed. Alert and oriented. Reminded to utilize call light when needed.

## 2023-10-04 NOTE — PLAN OF CARE
Problem: Discharge Planning  Goal: Discharge to home or other facility with appropriate resources  Outcome: Progressing  Flowsheets (Taken 10/4/2023 1744)  Discharge to home or other facility with appropriate resources:   Identify barriers to discharge with patient and caregiver   Arrange for needed discharge resources and transportation as appropriate   Identify discharge learning needs (meds, wound care, etc)   Arrange for interpreters to assist at discharge as needed   Refer to discharge planning if patient needs post-hospital services based on physician order or complex needs related to functional status, cognitive ability or social support system     Problem: Pain  Goal: Verbalizes/displays adequate comfort level or baseline comfort level  Outcome: Progressing  Flowsheets (Taken 10/4/2023 1744)  Verbalizes/displays adequate comfort level or baseline comfort level:   Encourage patient to monitor pain and request assistance   Administer analgesics based on type and severity of pain and evaluate response   Implement non-pharmacological measures as appropriate and evaluate response   Consider cultural and social influences on pain and pain management   Notify Licensed Independent Practitioner if interventions unsuccessful or patient reports new pain   Assess pain using appropriate pain scale     Problem: Safety - Adult  Goal: Free from fall injury  Outcome: Progressing  Flowsheets (Taken 10/4/2023 1744)  Free From Fall Injury:   Instruct family/caregiver on patient safety   Based on caregiver fall risk screen, instruct family/caregiver to ask for assistance with transferring infant if caregiver noted to have fall risk factors     Problem: Skin/Tissue Integrity - Adult  Goal: Skin integrity remains intact  Outcome: Progressing  Flowsheets  Taken 10/4/2023 1744 by Bronwyn Bey RN  Skin Integrity Remains Intact: Monitor for areas of redness and/or skin breakdown  Taken 10/4/2023 1028 by Radha Sanchez Integrity Remains Intact:   Monitor for areas of redness and/or skin breakdown   Assess vascular access sites hourly  Goal: Incisions, wounds, or drain sites healing without S/S of infection  Outcome: Progressing  Flowsheets  Taken 10/4/2023 1744 by Nivia Terrazas RN  Incisions, Wounds, or Drain Sites Healing Without Sign and Symptoms of Infection:   TWICE DAILY: Assess and document dressing/incision, wound bed, drain sites and surrounding tissue   Implement wound care per orders  Taken 10/4/2023 1028 by Aki Aly  Incisions, Wounds, or Drain Sites Healing Without Sign and Symptoms of Infection:   TWICE DAILY: Assess and document skin integrity   TWICE DAILY: Assess and document dressing/incision, wound bed, drain sites and surrounding tissue     Problem: Gastrointestinal - Adult  Goal: Maintains or returns to baseline bowel function  Outcome: Progressing  Flowsheets (Taken 10/4/2023 1744)  Maintains or returns to baseline bowel function:   Assess bowel function   Encourage oral fluids to ensure adequate hydration   Administer IV fluids as ordered to ensure adequate hydration   Administer ordered medications as needed   Encourage mobilization and activity   Nutrition consult to assist patient with appropriate food choices  Goal: Minimal or absence of nausea and vomiting  Outcome: Progressing  Flowsheets (Taken 10/4/2023 1744)  Minimal or absence of nausea and vomiting:   Administer IV fluids as ordered to ensure adequate hydration   Administer ordered antiemetic medications as needed   Provide nonpharmacologic comfort measures as appropriate   Advance diet as tolerated, if ordered   Nutrition consult to assist patient with adequate nutrition and appropriate food choices  Goal: Maintains adequate nutritional intake  Outcome: Progressing  Flowsheets (Taken 10/4/2023 1744)  Maintains adequate nutritional intake:   Monitor percentage of each meal consumed   Identify factors contributing to decreased intake,

## 2023-10-05 LAB
ANION GAP SERPL CALC-SCNC: 10 MEQ/L (ref 8–16)
BACTERIA BLD AEROBE CULT: NORMAL
BACTERIA BLD AEROBE CULT: NORMAL
BASOPHILS ABSOLUTE: 0 THOU/MM3 (ref 0–0.1)
BASOPHILS NFR BLD AUTO: 0.3 %
BUN SERPL-MCNC: 11 MG/DL (ref 7–22)
CALCIUM SERPL-MCNC: 8.1 MG/DL (ref 8.5–10.5)
CHLORIDE SERPL-SCNC: 103 MEQ/L (ref 98–111)
CO2 SERPL-SCNC: 25 MEQ/L (ref 23–33)
CREAT SERPL-MCNC: 1 MG/DL (ref 0.4–1.2)
DEPRECATED RDW RBC AUTO: 47.1 FL (ref 35–45)
ELLIPTOCYTES: ABNORMAL
EOSINOPHIL NFR BLD AUTO: 3.1 %
EOSINOPHILS ABSOLUTE: 0.5 THOU/MM3 (ref 0–0.4)
ERYTHROCYTE [DISTWIDTH] IN BLOOD BY AUTOMATED COUNT: 17.1 % (ref 11.5–14.5)
GFR SERPL CREATININE-BSD FRML MDRD: > 60 ML/MIN/1.73M2
GLUCOSE SERPL-MCNC: 97 MG/DL (ref 70–108)
HCT VFR BLD AUTO: 25.1 % (ref 37–47)
HGB BLD-MCNC: 7.7 GM/DL (ref 12–16)
IMM GRANULOCYTES # BLD AUTO: 0.44 THOU/MM3 (ref 0–0.07)
IMM GRANULOCYTES NFR BLD AUTO: 3 %
LYMPHOCYTES ABSOLUTE: 1.8 THOU/MM3 (ref 1–4.8)
LYMPHOCYTES NFR BLD AUTO: 12.4 %
MCH RBC QN AUTO: 23.3 PG (ref 26–33)
MCHC RBC AUTO-ENTMCNC: 30.7 GM/DL (ref 32.2–35.5)
MCV RBC AUTO: 75.8 FL (ref 81–99)
MONOCYTES ABSOLUTE: 0.9 THOU/MM3 (ref 0.4–1.3)
MONOCYTES NFR BLD AUTO: 6.3 %
NEUTROPHILS NFR BLD AUTO: 74.9 %
NRBC BLD AUTO-RTO: 0 /100 WBC
PLATELET # BLD AUTO: 855 THOU/MM3 (ref 130–400)
PLATELET BLD QL SMEAR: ABNORMAL
PMV BLD AUTO: 9.7 FL (ref 9.4–12.4)
POIKILOCYTES: ABNORMAL
POTASSIUM SERPL-SCNC: 3.6 MEQ/L (ref 3.5–5.2)
RBC # BLD AUTO: 3.31 MILL/MM3 (ref 4.2–5.4)
SCAN OF BLOOD SMEAR: NORMAL
SEGMENTED NEUTROPHILS ABSOLUTE COUNT: 10.9 THOU/MM3 (ref 1.8–7.7)
SODIUM SERPL-SCNC: 138 MEQ/L (ref 135–145)
WBC # BLD AUTO: 14.6 THOU/MM3 (ref 4.8–10.8)

## 2023-10-05 PROCEDURE — 2580000003 HC RX 258: Performed by: PHYSICIAN ASSISTANT

## 2023-10-05 PROCEDURE — 6370000000 HC RX 637 (ALT 250 FOR IP): Performed by: PHYSICIAN ASSISTANT

## 2023-10-05 PROCEDURE — 6360000002 HC RX W HCPCS: Performed by: INTERNAL MEDICINE

## 2023-10-05 PROCEDURE — 99232 SBSQ HOSP IP/OBS MODERATE 35: CPT

## 2023-10-05 PROCEDURE — 2580000003 HC RX 258: Performed by: INTERNAL MEDICINE

## 2023-10-05 PROCEDURE — 2580000003 HC RX 258

## 2023-10-05 PROCEDURE — 80048 BASIC METABOLIC PNL TOTAL CA: CPT

## 2023-10-05 PROCEDURE — 6360000002 HC RX W HCPCS

## 2023-10-05 PROCEDURE — 2060000000 HC ICU INTERMEDIATE R&B

## 2023-10-05 PROCEDURE — 6360000002 HC RX W HCPCS: Performed by: PHYSICIAN ASSISTANT

## 2023-10-05 PROCEDURE — 36415 COLL VENOUS BLD VENIPUNCTURE: CPT

## 2023-10-05 PROCEDURE — 85025 COMPLETE CBC W/AUTO DIFF WBC: CPT

## 2023-10-05 RX ORDER — KETOROLAC TROMETHAMINE 30 MG/ML
15 INJECTION, SOLUTION INTRAMUSCULAR; INTRAVENOUS EVERY 6 HOURS PRN
Status: DISPENSED | OUTPATIENT
Start: 2023-10-05 | End: 2023-10-10

## 2023-10-05 RX ORDER — IBUPROFEN 600 MG/1
600 TABLET ORAL EVERY 6 HOURS PRN
Status: DISCONTINUED | OUTPATIENT
Start: 2023-10-05 | End: 2023-10-18 | Stop reason: HOSPADM

## 2023-10-05 RX ADMIN — ACETAMINOPHEN 650 MG: 650 SUPPOSITORY RECTAL at 10:18

## 2023-10-05 RX ADMIN — ONDANSETRON 4 MG: 2 INJECTION INTRAMUSCULAR; INTRAVENOUS at 17:27

## 2023-10-05 RX ADMIN — MORPHINE SULFATE 2 MG: 2 INJECTION, SOLUTION INTRAMUSCULAR; INTRAVENOUS at 00:52

## 2023-10-05 RX ADMIN — KETOROLAC TROMETHAMINE 15 MG: 30 INJECTION, SOLUTION INTRAMUSCULAR at 12:31

## 2023-10-05 RX ADMIN — MORPHINE SULFATE 2 MG: 2 INJECTION, SOLUTION INTRAMUSCULAR; INTRAVENOUS at 04:39

## 2023-10-05 RX ADMIN — ACETAMINOPHEN 650 MG: 650 SUPPOSITORY RECTAL at 21:05

## 2023-10-05 RX ADMIN — PIPERACILLIN AND TAZOBACTAM 3375 MG: 3; .375 INJECTION, POWDER, LYOPHILIZED, FOR SOLUTION INTRAVENOUS at 02:00

## 2023-10-05 RX ADMIN — SODIUM CHLORIDE: 9 INJECTION, SOLUTION INTRAVENOUS at 20:40

## 2023-10-05 RX ADMIN — KETOROLAC TROMETHAMINE 15 MG: 30 INJECTION, SOLUTION INTRAMUSCULAR at 18:38

## 2023-10-05 RX ADMIN — LINEZOLID 600 MG: 600 INJECTION, SOLUTION INTRAVENOUS at 00:54

## 2023-10-05 RX ADMIN — MORPHINE SULFATE 2 MG: 2 INJECTION, SOLUTION INTRAMUSCULAR; INTRAVENOUS at 07:50

## 2023-10-05 RX ADMIN — ACETAMINOPHEN 650 MG: 650 SUPPOSITORY RECTAL at 15:01

## 2023-10-05 RX ADMIN — MEROPENEM 1000 MG: 1 INJECTION, POWDER, FOR SOLUTION INTRAVENOUS at 22:12

## 2023-10-05 RX ADMIN — SODIUM CHLORIDE, PRESERVATIVE FREE 10 ML: 5 INJECTION INTRAVENOUS at 07:51

## 2023-10-05 RX ADMIN — MEROPENEM 1000 MG: 1 INJECTION, POWDER, FOR SOLUTION INTRAVENOUS at 15:07

## 2023-10-05 RX ADMIN — SODIUM CHLORIDE: 9 INJECTION, SOLUTION INTRAVENOUS at 08:18

## 2023-10-05 RX ADMIN — PIPERACILLIN AND TAZOBACTAM 3375 MG: 3; .375 INJECTION, POWDER, LYOPHILIZED, FOR SOLUTION INTRAVENOUS at 10:22

## 2023-10-05 RX ADMIN — MORPHINE SULFATE 2 MG: 2 INJECTION, SOLUTION INTRAMUSCULAR; INTRAVENOUS at 21:07

## 2023-10-05 RX ADMIN — ONDANSETRON 4 MG: 2 INJECTION INTRAMUSCULAR; INTRAVENOUS at 08:18

## 2023-10-05 RX ADMIN — ACETAMINOPHEN 650 MG: 650 SUPPOSITORY RECTAL at 06:13

## 2023-10-05 RX ADMIN — ACETAMINOPHEN 650 MG: 650 SUPPOSITORY RECTAL at 01:15

## 2023-10-05 RX ADMIN — LINEZOLID 600 MG: 600 INJECTION, SOLUTION INTRAVENOUS at 13:49

## 2023-10-05 RX ADMIN — MORPHINE SULFATE 2 MG: 2 INJECTION, SOLUTION INTRAMUSCULAR; INTRAVENOUS at 11:23

## 2023-10-05 ASSESSMENT — PAIN SCALES - GENERAL
PAINLEVEL_OUTOF10: 6
PAINLEVEL_OUTOF10: 8
PAINLEVEL_OUTOF10: 6
PAINLEVEL_OUTOF10: 8
PAINLEVEL_OUTOF10: 0
PAINLEVEL_OUTOF10: 8
PAINLEVEL_OUTOF10: 0
PAINLEVEL_OUTOF10: 7
PAINLEVEL_OUTOF10: 8
PAINLEVEL_OUTOF10: 7
PAINLEVEL_OUTOF10: 6
PAINLEVEL_OUTOF10: 7

## 2023-10-05 ASSESSMENT — PAIN - FUNCTIONAL ASSESSMENT
PAIN_FUNCTIONAL_ASSESSMENT: PREVENTS OR INTERFERES SOME ACTIVE ACTIVITIES AND ADLS
PAIN_FUNCTIONAL_ASSESSMENT: PREVENTS OR INTERFERES WITH MANY ACTIVE NOT PASSIVE ACTIVITIES

## 2023-10-05 ASSESSMENT — PAIN DESCRIPTION - LOCATION
LOCATION: ABDOMEN;BACK

## 2023-10-05 ASSESSMENT — PAIN DESCRIPTION - ONSET
ONSET: ON-GOING

## 2023-10-05 ASSESSMENT — PAIN DESCRIPTION - FREQUENCY
FREQUENCY: CONTINUOUS

## 2023-10-05 ASSESSMENT — PAIN DESCRIPTION - ORIENTATION
ORIENTATION: RIGHT

## 2023-10-05 ASSESSMENT — PAIN DESCRIPTION - PAIN TYPE
TYPE: SURGICAL PAIN

## 2023-10-05 ASSESSMENT — PAIN DESCRIPTION - DESCRIPTORS
DESCRIPTORS: SHARP
DESCRIPTORS: ACHING;SHOOTING
DESCRIPTORS: SHARP;ACHING
DESCRIPTORS: ACHING;SHARP
DESCRIPTORS: SORE

## 2023-10-05 NOTE — FLOWSHEET NOTE
Virtual nurse completed safety round with patient not wanting virtual nurse to turn camera since he is using the bathroom. Thank you.

## 2023-10-05 NOTE — CARE COORDINATION
10/5/23, 8:34 AM EDT    DISCHARGE ON GOING EVALUATION    [facility] day: 3  Location: -27/027-A Reason for admit: Elevated C-reactive protein (CRP) [R79.82]  Postoperative intra-abdominal abscess [T81.43XA]  Abscess of abdominal cavity (HCC) [K65.1]  SIRS (systemic inflammatory response syndrome) (HCC) [R65.10]  Leukocytosis, unspecified type [D72.829]  Infection of deep incisional surgical site after procedure, initial encounter [T81.42XA]  Postoperative abscess [T81.49XA]   Procedure:    9/30 CT guided drain placement  10/2 CTA abd: Large fluid collection in the rectovaginal pouch measuring 10.2 x 7.3 x 11 cm in size. Status post appendectomy. Drain in the right lower quadrant. Fluid in the right lower quadrant measuring 6.8 x 4 cm in size  10/3 CT guided drainage of pelvic hematoma/abscess  Barriers to Discharge: WBC 14.6, Hgb 7.7, blood culture with no growth at 5 days. ID, General Surgery following, IV fluids, ferrous sulfate, pain and nausea control, IV Zyvox, IV Zosyn. PCP: No primary care provider on file. Readmission Risk Score: 8%  Patient Goals/Plan/Treatment Preferences: Home with roommates.  [facility], appointment will need scheduled

## 2023-10-05 NOTE — PROGRESS NOTES
[facility]--. [id number] [facility] PROGRESS NOTE      Patient: Kris Singh  Room #: 4E-51/945-E            YOB: 2000  Age: 25 y.o. Gender: adult            Admit Date & Time: 9/30/2023 12:11 AM    Assessment:    The patient was asleep at the time of this attempted visit. Interventions: The patient was provided silent prayer. Outcomes: The patient remains sleeping at the end of the visit. Plan: 1. Spiritual care will continue to follow the patient according to [facility]'s spiritual care SOP.        Electronically signed by Dominique Hearn on 10/5/2023 at 1:45 PM.  Maggie  [phone]     10/05/23 1343   Encounter Summary   Encounter Overview/Reason  Initial Encounter   Service Provided For: Patient   Referral/Consult From: [facility]   Support System Unknown   Last Encounter  10/05/23  (NR)   Complexity of Encounter Low   Begin Time 1315   End Time  1320   Total Time Calculated 5 min   Spiritual/Emotional needs   Type Spiritual Support   Assessment/Intervention/Outcome   Assessment Unable to assess   Intervention Prayer (assurance of)/[place]   Outcome Did not respond

## 2023-10-05 NOTE — PROGRESS NOTES
Hospitalist Progress Note      Patient:  Gentry Person    Unit/Bed:4K-27/027-A  YOB: 2000  MRN: [id number]   Acct: [de-identified]   PCP: No primary care provider on file. Date of Admission: 9/30/2023    Assessment/Plan:  Sepsis secondary to postoperative abscess s/p appendectomy 9/21 with Dr. Kim Hall: Consult to IR for aspiration drainage and drain placement (completed 9/30) - will repeat consult to see if can reach other fluid collection noted on repeat CT A/P. Still spiking fevers, discussed with ID. Second drain placed posteriorly with 500 mL bloody fluid drained on 10/3. MRSA negative, vancomycin stopped. Continued on Zosyn, discussed with ID due to persistent fevers/tachycardia will add Zyvox 10/4. Continue to follow cultures. Full liquid diet per general surgery. Advance per their recommendation. 10/5 Zosyn changed to meropenem. ID and Dr. Kim Hall following. Plan to rescan abdomen in 1 to 2 days to evaluate need for drains. CARLITA, improved: Creatinine elevated at 2.0 on 10/2. Likely due to pre-renal causes, dehydration and poor PO intake in addition to some hypotension. Also consideration for Vanc dosing initially. On continuous IV fluids. Monitor with BMP. Creatinine improved to 1.0. We will continue IV fluids for now with decreased oral intake. NAGMA: CO2 noted at 15 on 10/1, likely related to renal dysfunction. Daily BMP. Continue IVFs. Serum bicarb improved to 25. Acute on chronic microcytic anemia: likely some acute blood loss contributing. Hgb noted at 7.4 on 10/1, discussed with Gen Surg who recommends 1 unit transfusion. Will monitor with CBC daily. Very low iron studies, will start ferrous sulfate and monitor. Cont to monitor and transfuse for Hgb < 7. Hemoglobin stable at 7.7 this morning. Lower L kidney abnormality: noted on US which recommended CT.  CT was done on admission and there is Contrast? None    Result Date: 9/30/2023  ** ADDENDUM #1 ** This report was discussed with Patrica Delatorre on Sep 30, 2023 01:40:00 EDT. This document has been electronically signed by: Emily Flores on 09/30/2023 02:20 AM ** ORIGINAL REPORT ** CT abdomen and pelvis with contrast Comparison: 09/21/2023 Findings: There has been interval appendectomy. Large loculated fluid collections are now evident in the pelvis and within the right side of the abdomen. The largest collection is posterior to the uterus and may have engulfed the right ovary. The large collection has increased density inferiorly indicating blood products. This loculated collection has a narrow channel fluid on the left superiorly that appears to be contiguous with the left ovary. Multiple contiguous cyst-like structures form a complex arising from the inferior right ovary that is within the larger loculated fluid collection. Separate anterior pelvic fluid collection has a J-shaped configuration measuring 10.5 cm in long axis and 5.7 cm in short axis. Stranding is seen surrounding the fluid collection which is clearly loculated and may reflect an abscess collection. Surgical clips are noted at the medial aspect of the cecum extending slightly posteriorly from recent appendectomy. There is multiloculated fluid contiguous with the lateral aspect of the cecum extending through the right paracolic gutter. This collection measures 9.2 cm in long axis craniocaudally 3.0 cm transversely. This collection is contiguous with the more rounded collection in the right upper quadrant measuring 6.7 cm x 8.8 cm x 5.9 cm. There is increased density centrally in the right upper quadrant fluid collection indicating component of hematoma/blood products. Right upper quadrant fluid collection is also contiguous with fluid extending into the anterior abdominal wall musculature anterolaterally.  This collection extends through the muscle layers and into subcutaneous tissues

## 2023-10-05 NOTE — PLAN OF CARE
Problem: Musculoskeletal - Adult  Goal: Return mobility to safest level of function  10/4/2023 2148 by Elise Matson RN  Outcome: Not Progressing  Flowsheets  Taken 10/4/2023 2148 by Elise Matson RN  Return Mobility to Safest Level of Function:   Assess patient stability and activity tolerance for standing, transferring and ambulating with or without assistive devices   Assist with transfers and ambulation using safe patient handling equipment as needed  Taken 10/4/2023 1744 by Pam Willingham RN  Return Mobility to Safest Level of Function:   Assess patient stability and activity tolerance for standing, transferring and ambulating with or without assistive devices   Assist with transfers and ambulation using safe patient handling equipment as needed   Ensure adequate protection for wounds/incisions during mobilization   Obtain physical therapy/occupational therapy consults as needed   Apply continuous passive motion per provider or physical therapy orders to increase flexion toward goal   Instruct patient/family in ordered activity level  10/4/2023 1744 by Pam Willingham RN  Outcome: Progressing  Flowsheets (Taken 10/4/2023 1744)  Return Mobility to Safest Level of Function:   Assess patient stability and activity tolerance for standing, transferring and ambulating with or without assistive devices   Assist with transfers and ambulation using safe patient handling equipment as needed   Ensure adequate protection for wounds/incisions during mobilization   Obtain physical therapy/occupational therapy consults as needed   Apply continuous passive motion per provider or physical therapy orders to increase flexion toward goal   Instruct patient/family in ordered activity level     Problem: Musculoskeletal - Adult  Goal: Return mobility to safest level of function  10/4/2023 2148 by Elise Matson RN  Outcome: Not Progressing  Flowsheets  Taken 10/4/2023 2148 by Elise Matson RN  Return Mobility to Safest Level of Function:   Assess patient stability and activity tolerance for standing, transferring and ambulating with or without assistive devices   Assist with transfers and ambulation using safe patient handling equipment as needed  Taken 10/4/2023 1744 by Pam Willingham RN  Return Mobility to Safest Level of Function:   Assess patient stability and activity tolerance for standing, transferring and ambulating with or without assistive devices   Assist with transfers and ambulation using safe patient handling equipment as needed   Ensure adequate protection for wounds/incisions during mobilization   Obtain physical therapy/occupational therapy consults as needed   Apply continuous passive motion per provider or physical therapy orders to increase flexion toward goal   Instruct patient/family in ordered activity level  10/4/2023 1744 by Pam Willingham RN  Outcome: Progressing  Flowsheets (Taken 10/4/2023 1744)  Return Mobility to Safest Level of Function:   Assess patient stability and activity tolerance for standing, transferring and ambulating with or without assistive devices   Assist with transfers and ambulation using safe patient handling equipment as needed   Ensure adequate protection for wounds/incisions during mobilization   Obtain physical therapy/occupational therapy consults as needed   Apply continuous passive motion per provider or physical therapy orders to increase flexion toward goal   Instruct patient/family in ordered activity level   Care plan reviewed with patient. Patient verbalize understanding of the plan of care and contribute to goal setting.

## 2023-10-05 NOTE — PLAN OF CARE
Problem: Infection - Adult  Goal: Absence of infection during hospitalization  Recent Flowsheet Documentation  Taken 10/5/2023 0800 by Dominique Pugh RN  Absence of infection during hospitalization:   Assess and monitor for signs and symptoms of infection   Monitor lab/diagnostic results     Problem: Discharge Planning  Goal: Discharge to home or other facility with appropriate resources  Outcome: Progressing  Flowsheets (Taken 10/5/2023 0800)  Discharge to home or other facility with appropriate resources:   Identify barriers to discharge with patient and caregiver   Arrange for needed discharge resources and transportation as appropriate     Problem: Pain  Goal: Verbalizes/displays adequate comfort level or baseline comfort level  Outcome: Progressing     Problem: Safety - Adult  Goal: Free from fall injury  Outcome: Progressing     Problem: Skin/Tissue Integrity - Adult  Goal: Skin integrity remains intact  Outcome: Progressing  Goal: Incisions, wounds, or drain sites healing without S/S of infection  Outcome: Progressing     Problem: Gastrointestinal - Adult  Goal: Maintains or returns to baseline bowel function  Outcome: Progressing  Goal: Minimal or absence of nausea and vomiting  Outcome: Progressing  Goal: Maintains adequate nutritional intake  Outcome: Progressing  Flowsheets (Taken 10/5/2023 6144)  Maintains adequate nutritional intake:   Monitor percentage of each meal consumed   Identify factors contributing to decreased intake, treat as appropriate

## 2023-10-06 LAB
ANION GAP SERPL CALC-SCNC: 10 MEQ/L (ref 8–16)
BACTERIA SPEC AEROBE CULT: NORMAL
BACTERIA SPEC ANAEROBE CULT: NORMAL
BASOPHILS ABSOLUTE: 0 THOU/MM3 (ref 0–0.1)
BASOPHILS NFR BLD AUTO: 0.3 %
BUN SERPL-MCNC: 11 MG/DL (ref 7–22)
CALCIUM SERPL-MCNC: 7.7 MG/DL (ref 8.5–10.5)
CHLORIDE SERPL-SCNC: 104 MEQ/L (ref 98–111)
CO2 SERPL-SCNC: 22 MEQ/L (ref 23–33)
CREAT SERPL-MCNC: 0.8 MG/DL (ref 0.4–1.2)
DEPRECATED RDW RBC AUTO: 47.8 FL (ref 35–45)
ELLIPTOCYTES: ABNORMAL
EOSINOPHIL NFR BLD AUTO: 3.3 %
EOSINOPHILS ABSOLUTE: 0.5 THOU/MM3 (ref 0–0.4)
ERYTHROCYTE [DISTWIDTH] IN BLOOD BY AUTOMATED COUNT: 17.4 % (ref 11.5–14.5)
GFR SERPL CREATININE-BSD FRML MDRD: > 60 ML/MIN/1.73M2
GLUCOSE SERPL-MCNC: 93 MG/DL (ref 70–108)
GRAM STN SPEC: NORMAL
HCT VFR BLD AUTO: 26.6 % (ref 37–47)
HGB BLD-MCNC: 8 GM/DL (ref 12–16)
IMM GRANULOCYTES # BLD AUTO: 0.4 THOU/MM3 (ref 0–0.07)
IMM GRANULOCYTES NFR BLD AUTO: 2.9 %
LYMPHOCYTES ABSOLUTE: 1.5 THOU/MM3 (ref 1–4.8)
LYMPHOCYTES NFR BLD AUTO: 11.2 %
MCH RBC QN AUTO: 23.1 PG (ref 26–33)
MCHC RBC AUTO-ENTMCNC: 30.1 GM/DL (ref 32.2–35.5)
MCV RBC AUTO: 76.7 FL (ref 81–99)
MONOCYTES ABSOLUTE: 0.7 THOU/MM3 (ref 0.4–1.3)
MONOCYTES NFR BLD AUTO: 5.3 %
NEUTROPHILS NFR BLD AUTO: 77 %
NRBC BLD AUTO-RTO: 0 /100 WBC
PLATELET # BLD AUTO: 985 THOU/MM3 (ref 130–400)
PLATELET BLD QL SMEAR: ABNORMAL
PMV BLD AUTO: 9.8 FL (ref 9.4–12.4)
POIKILOCYTES: ABNORMAL
POTASSIUM SERPL-SCNC: 3.4 MEQ/L (ref 3.5–5.2)
RBC # BLD AUTO: 3.47 MILL/MM3 (ref 4.2–5.4)
SCAN OF BLOOD SMEAR: NORMAL
SEGMENTED NEUTROPHILS ABSOLUTE COUNT: 10.5 THOU/MM3 (ref 1.8–7.7)
SODIUM SERPL-SCNC: 136 MEQ/L (ref 135–145)
VARIANT LYMPHS BLD QL SMEAR: ABNORMAL %
WBC # BLD AUTO: 13.7 THOU/MM3 (ref 4.8–10.8)

## 2023-10-06 PROCEDURE — 80048 BASIC METABOLIC PNL TOTAL CA: CPT

## 2023-10-06 PROCEDURE — 6360000002 HC RX W HCPCS

## 2023-10-06 PROCEDURE — 6370000000 HC RX 637 (ALT 250 FOR IP): Performed by: PHYSICIAN ASSISTANT

## 2023-10-06 PROCEDURE — 2580000003 HC RX 258: Performed by: PHYSICIAN ASSISTANT

## 2023-10-06 PROCEDURE — 2580000003 HC RX 258: Performed by: INTERNAL MEDICINE

## 2023-10-06 PROCEDURE — 85025 COMPLETE CBC W/AUTO DIFF WBC: CPT

## 2023-10-06 PROCEDURE — 99233 SBSQ HOSP IP/OBS HIGH 50: CPT

## 2023-10-06 PROCEDURE — 2060000000 HC ICU INTERMEDIATE R&B

## 2023-10-06 PROCEDURE — 36415 COLL VENOUS BLD VENIPUNCTURE: CPT

## 2023-10-06 PROCEDURE — 6360000002 HC RX W HCPCS: Performed by: INTERNAL MEDICINE

## 2023-10-06 RX ORDER — TESTOSTERONE CYPIONATE 200 MG/ML
50 INJECTION, SOLUTION INTRAMUSCULAR
Status: DISCONTINUED | OUTPATIENT
Start: 2023-10-11 | End: 2023-10-18 | Stop reason: HOSPADM

## 2023-10-06 RX ORDER — POTASSIUM CHLORIDE 7.45 MG/ML
10 INJECTION INTRAVENOUS PRN
Status: DISCONTINUED | OUTPATIENT
Start: 2023-10-06 | End: 2023-10-18 | Stop reason: HOSPADM

## 2023-10-06 RX ORDER — PROCHLORPERAZINE EDISYLATE 5 MG/ML
10 INJECTION INTRAMUSCULAR; INTRAVENOUS EVERY 6 HOURS PRN
Status: DISCONTINUED | OUTPATIENT
Start: 2023-10-06 | End: 2023-10-11

## 2023-10-06 RX ORDER — POTASSIUM CHLORIDE 29.8 MG/ML
20 INJECTION INTRAVENOUS PRN
Status: DISCONTINUED | OUTPATIENT
Start: 2023-10-06 | End: 2023-10-06

## 2023-10-06 RX ORDER — TESTOSTERONE CYPIONATE 200 MG/ML
50 INJECTION, SOLUTION INTRAMUSCULAR WEEKLY
COMMUNITY
Start: 2023-06-07

## 2023-10-06 RX ADMIN — SODIUM CHLORIDE: 9 INJECTION, SOLUTION INTRAVENOUS at 17:34

## 2023-10-06 RX ADMIN — KETOROLAC TROMETHAMINE 15 MG: 30 INJECTION, SOLUTION INTRAMUSCULAR at 12:28

## 2023-10-06 RX ADMIN — ONDANSETRON 4 MG: 2 INJECTION INTRAMUSCULAR; INTRAVENOUS at 23:01

## 2023-10-06 RX ADMIN — MEROPENEM 1000 MG: 1 INJECTION, POWDER, FOR SOLUTION INTRAVENOUS at 06:12

## 2023-10-06 RX ADMIN — POTASSIUM CHLORIDE 10 MEQ: 7.46 INJECTION, SOLUTION INTRAVENOUS at 12:31

## 2023-10-06 RX ADMIN — KETOROLAC TROMETHAMINE 15 MG: 30 INJECTION, SOLUTION INTRAMUSCULAR at 02:43

## 2023-10-06 RX ADMIN — SODIUM CHLORIDE: 9 INJECTION, SOLUTION INTRAVENOUS at 06:14

## 2023-10-06 RX ADMIN — POTASSIUM CHLORIDE 10 MEQ: 7.46 INJECTION, SOLUTION INTRAVENOUS at 11:30

## 2023-10-06 RX ADMIN — LINEZOLID 600 MG: 600 INJECTION, SOLUTION INTRAVENOUS at 02:36

## 2023-10-06 RX ADMIN — MEROPENEM 1000 MG: 1 INJECTION, POWDER, FOR SOLUTION INTRAVENOUS at 15:13

## 2023-10-06 RX ADMIN — MEROPENEM 1000 MG: 1 INJECTION, POWDER, FOR SOLUTION INTRAVENOUS at 21:23

## 2023-10-06 RX ADMIN — ACETAMINOPHEN 650 MG: 650 SUPPOSITORY RECTAL at 04:05

## 2023-10-06 RX ADMIN — ONDANSETRON 4 MG: 2 INJECTION INTRAMUSCULAR; INTRAVENOUS at 12:28

## 2023-10-06 RX ADMIN — POTASSIUM CHLORIDE 10 MEQ: 7.46 INJECTION, SOLUTION INTRAVENOUS at 09:25

## 2023-10-06 RX ADMIN — LINEZOLID 600 MG: 600 INJECTION, SOLUTION INTRAVENOUS at 13:29

## 2023-10-06 RX ADMIN — POTASSIUM CHLORIDE 10 MEQ: 7.46 INJECTION, SOLUTION INTRAVENOUS at 10:27

## 2023-10-06 ASSESSMENT — PAIN DESCRIPTION - PAIN TYPE
TYPE: SURGICAL PAIN

## 2023-10-06 ASSESSMENT — PAIN DESCRIPTION - FREQUENCY
FREQUENCY: CONTINUOUS

## 2023-10-06 ASSESSMENT — PAIN - FUNCTIONAL ASSESSMENT
PAIN_FUNCTIONAL_ASSESSMENT: PREVENTS OR INTERFERES SOME ACTIVE ACTIVITIES AND ADLS
PAIN_FUNCTIONAL_ASSESSMENT: ACTIVITIES ARE NOT PREVENTED
PAIN_FUNCTIONAL_ASSESSMENT: PREVENTS OR INTERFERES SOME ACTIVE ACTIVITIES AND ADLS

## 2023-10-06 ASSESSMENT — PAIN SCALES - WONG BAKER
WONGBAKER_NUMERICALRESPONSE: 4
WONGBAKER_NUMERICALRESPONSE: 6
WONGBAKER_NUMERICALRESPONSE: 4
WONGBAKER_NUMERICALRESPONSE: 4
WONGBAKER_NUMERICALRESPONSE: 6
WONGBAKER_NUMERICALRESPONSE: 4

## 2023-10-06 ASSESSMENT — PAIN DESCRIPTION - ORIENTATION
ORIENTATION: RIGHT

## 2023-10-06 ASSESSMENT — PAIN DESCRIPTION - LOCATION
LOCATION: ABDOMEN
LOCATION: ABDOMEN
LOCATION: ABDOMEN;BACK

## 2023-10-06 ASSESSMENT — PAIN SCALES - GENERAL
PAINLEVEL_OUTOF10: 3
PAINLEVEL_OUTOF10: 6
PAINLEVEL_OUTOF10: 4
PAINLEVEL_OUTOF10: 6
PAINLEVEL_OUTOF10: 4
PAINLEVEL_OUTOF10: 5
PAINLEVEL_OUTOF10: 6
PAINLEVEL_OUTOF10: 4

## 2023-10-06 ASSESSMENT — PAIN DESCRIPTION - ONSET
ONSET: ON-GOING

## 2023-10-06 ASSESSMENT — PAIN DESCRIPTION - DESCRIPTORS
DESCRIPTORS: ACHING;SORE
DESCRIPTORS: ACHING
DESCRIPTORS: DISCOMFORT

## 2023-10-06 NOTE — PROGRESS NOTES
Received report from night shift MAYO Newman with day Shift RN Vince Hankins. 0730      Head to [address] [facility] Student Nurse    Skin  General skin appearance / Description: warm, dry, appropriate for ethnicity  Incisions / Wounds: surgical wounds/incisions, two drains, dressing changed, site dry    Neuro/Head  LOC: A+O x 4  Speech: clear, appropriate  Eyes PERRLA 4 to 3 mm  Symmetry of face / tongue: symmetrical, no tongue deviation  JVD of neck: not present    Chest  Heart sounds / Apical Pulse: regular, strong  Dyspnea: no SOB  Lung sounds: clear throughout bilaterally  Cough: no cough present    Abdomen/ Pelvis  Bowel sounds: active all 4 quadrants  Passing flatus: yes  Palpation / Inspection: soft, non tender left side, tender right side due to surgical sites, flat   Last BM: 10/06/2023  Stool assessment: soft, brown, small amount  Any GI issues: no  Urinary issues: no  Continence: yes  Urine color / turbidity:    Extremities  Edema: none present  Altered Sensation: no  Upper extremity push / pulls: strong, equal  Left Arm Radial Pulse: strong   Left Upper extremity Capillary Refill: >3 seconds  Right Arm Radial Pulse: strong   Right Upper extremity Capillary Refill: >3 seconds  Lower extremity pedal push / pulls: moderate, equal  Left pedal pulse: strong   Left posterior tibialis pulse: strong   Left lower extremity capillary refill: >3 seconds  Right pedal pulse: strong   Right posterior tibialis pulse: strong   Right lower extremity capillary refill: >3 seconds  Drift of extremities: no drift present  Pain: 4/10 on 0-10 pain scale    Other issues: infection of surgical site    Notified RN Omar of draining incision site. 1051     Reassessment- no changes to prior. 1230. Pt resting comfortably in bed. 1321     End of shift report given to primary MAYO Hankins.  [address] / SN

## 2023-10-06 NOTE — PLAN OF CARE
Problem: Pain  Goal: Verbalizes/displays adequate comfort level or baseline comfort level  10/5/2023 2318 by Sarina Morris RN  Outcome: Progressing  10/5/2023 1258 by Alyssa Hinds RN  Outcome: Progressing     Problem: Safety - Adult  Goal: Free from fall injury  10/5/2023 2318 by Sarina Morris RN  Outcome: Progressing  10/5/2023 1258 by Alyssa Hinds RN  Outcome: Progressing     Problem: Skin/Tissue Integrity - Adult  Goal: Skin integrity remains intact  10/5/2023 2318 by Sarina Morris RN  Outcome: Progressing  10/5/2023 1258 by Alyssa Hinds RN  Outcome: Progressing     Problem: Skin/Tissue Integrity - Adult  Goal: Incisions, wounds, or drain sites healing without S/S of infection  10/5/2023 2318 by Sarina Morris RN  Outcome: Progressing  10/5/2023 1258 by Alyssa Hinds RN  Outcome: Progressing     Problem: Skin/Tissue Integrity  Goal: Absence of new skin breakdown  Description: 1. Monitor for areas of redness and/or skin breakdown  2. Assess vascular access sites hourly  3. Every 4-6 hours minimum:  Change oxygen saturation probe site  4. Every 4-6 hours:  If on nasal continuous positive airway pressure, respiratory therapy assess nares and determine need for appliance change or resting period. Outcome: Progressing     Problem: Cardiovascular - Adult  Goal: Maintains optimal cardiac output and hemodynamic stability  Outcome: Progressing     Problem: Cardiovascular - Adult  Goal: Absence of cardiac dysrhythmias or at baseline  Outcome: Progressing     Problem: Hematologic - Adult  Goal: Maintains hematologic stability  Outcome: Progressing  Care plan reviewed with patient  Patient verbalize understanding of the plan of care and contribute to goal setting.

## 2023-10-06 NOTE — CARE COORDINATION
10/6/23, 8:40 AM EDT    DISCHARGE  [address] day: 4  Location: [facility]27/027-A Reason for admit: Elevated C-reactive protein (CRP) [R79.82]  Postoperative intra-abdominal abscess [T81.43XA]  Abscess of abdominal cavity (HCC) [K65.1]  SIRS (systemic inflammatory response syndrome) (HCC) [R65.10]  Leukocytosis, unspecified type [D72.829]  Infection of deep incisional surgical site after procedure, initial encounter [T81.42XA]  Postoperative abscess [T81.49XA]   Procedure:    9/30 CT guided drain placement  10/2 CTA abd: Large fluid collection in the rectovaginal pouch measuring 10.2 x 7.3 x 11 cm in size. Status post appendectomy. Drain in the right lower quadrant. Fluid in the right lower quadrant measuring 6.8 x 4 cm in size  10/3 CT guided drainage of pelvic hematoma/abscess    Barriers to Discharge: WBC 13.7, Hgb 8.0, ID, General Surgery following, IV fluids, ferrous sulfate, pain and nausea control, IV Zyvox, IV Merrem, electrolyte replacement protocols. PCP: No primary care provider on file. Readmission Risk Score: 10.6%  Patient Goals/Plan/Treatment Preferences:  Home with roommates.  [facility], appointment will need scheduled

## 2023-10-06 NOTE — FLOWSHEET NOTE
10/06/23 6721   Safe Environment   Safety Measures Other (comment)  (VN safety round)     VN called into patients room and introduced myself and role. Patient answered and permitted video. Video activated. . Patient up in chair. . Patient voiced no needs or concerns at this time. Call light within reach.

## 2023-10-06 NOTE — FLOWSHEET NOTE
10/06/23 1137   Safe Environment   Safety Measures Other (comment)  (Vn safety round)     VN called into patients room and introduced myself and role. Patient answered and permitted video. Video activated. . Patient up in chair. . Patient voiced no needs or concerns at this time. Call light within reach.

## 2023-10-06 NOTE — PROGRESS NOTES
Hospitalist Progress Note      Patient:  Jose Hamilton    Unit/Bed:4K-27/027-A  YOB: 2000  MRN: [id number]   Acct: [de-identified]   PCP: No primary care provider on file. Date of Admission: 9/30/2023    Assessment/Plan:  Sepsis secondary to postoperative abscess s/p appendectomy 9/21 with Dr. Suman Molina: Consult to IR for aspiration drainage and drain placement (completed 9/30) - will repeat consult to see if can reach other fluid collection noted on repeat CT A/P. Still spiking fevers, discussed with ID. Second drain placed posteriorly with 500 mL bloody fluid drained on 10/3. MRSA negative, vancomycin stopped. Continued on Zosyn, discussed with ID due to persistent fevers/tachycardia will add Zyvox 10/4. Continue to follow cultures. Full liquid diet per general surgery. Advance per their recommendation. 10/5 Zosyn changed to meropenem. ID and Dr. Suman Molina following. If minimal drain output, likely will plan to remove drains Sunday or Monday. Continue meropenem and Zyvox. Fevers better controlled. WBC 13.7 today. Hypokalemia: Potassium 3.4 this morning. Replacement protocol ordered. Repeat BMP in the morning. CARLITA, resolved: Creatinine elevated at 2.0 on 10/2. Likely due to pre-renal causes, dehydration and poor PO intake in addition to some hypotension. Also consideration for Vanc dosing initially. On continuous IV fluids. Monitor with BMP. Creatinine improved to 0.8. We will continue IV fluids for now with decreased oral intake. NAGMA: CO2 noted at 15 on 10/1, likely related to renal dysfunction. Daily BMP. Continue IVFs. Serum bicarb improved to 22. Acute on chronic microcytic anemia: likely some acute blood loss contributing. Hgb noted at 7.4 on 10/1, discussed with Gen Surg who recommends 1 unit transfusion. Will monitor with CBC daily. Very low iron studies, will start ferrous sulfate and monitor.

## 2023-10-06 NOTE — CONSULTS
[place] [address]                                  CONSULTATION    PATIENT NAME: Luisa Mario                    :        2000  MED REC NO:   [id number]                           ROOM:       0709  ACCOUNT NO:   [de-identified]                           ADMIT DATE: 2023  PROVIDER:     Salome Cruz. Coleman Silva MD    CONSULT DATE:  10/01/2023    CHIEF COMPLAINT:  Probable infected hematoma post laparoscopic  appendectomy. HISTORY OF PRESENT ILLNESS:  The patient is a 26-year-old female who is  transitioning to a male who had presented on  with abdominal pain  and underwent a workup showing appendicitis with appendicolith. The  patient was taken to surgery on  undergoing a fairly  straightforward laparoscopic appendectomy. The patient although not  symptomatic did have a decrease in her hemoglobin prior to discharge but  was stable. Abdomen was soft and was discharged home. The patient  returned 9 days later, late night of  with fever, chills. It  should be noted the patient had never called my office to report any of  these symptoms. Workup has revealed a pelvic abscess. The patient was  admitted to the Medicine Service and Surgery has been consulted for  further evaluation. PAST MEDICAL HISTORY:  None, other that the fact that the patient is  transitioning from female to a male. She is on testosterone. PAST SURGICAL HISTORY:  Lyman teeth and the lap appy as mentioned  above. ALLERGIES:  TO LATEX. FAMILY HISTORY:  Noncontributory. SOCIAL HISTORY:  She is a nonsmoker, occasional user of alcohol. MEDICATIONS:  Testosterone. REVIEW OF SYSTEMS:  10-point review of systems is otherwise negative. PHYSICAL EXAMINATION:  GENERAL:  The patient is a 26-year-old female transitioning to male,  appears her age. The patient is obviously uncomfortable.   HEAD, EARS, EYES, NOSE, AND THROAT:

## 2023-10-07 LAB
ANION GAP SERPL CALC-SCNC: 10 MEQ/L (ref 8–16)
AUTO DIFF PNL BLD: ABNORMAL
BASOPHILS ABSOLUTE: 0 THOU/MM3 (ref 0–0.1)
BASOPHILS NFR BLD AUTO: 0.3 %
BUN SERPL-MCNC: 10 MG/DL (ref 7–22)
CALCIUM SERPL-MCNC: 8 MG/DL (ref 8.5–10.5)
CHLORIDE SERPL-SCNC: 100 MEQ/L (ref 98–111)
CO2 SERPL-SCNC: 23 MEQ/L (ref 23–33)
CREAT SERPL-MCNC: 0.8 MG/DL (ref 0.4–1.2)
DEPRECATED RDW RBC AUTO: 46.4 FL (ref 35–45)
ELLIPTOCYTES: ABNORMAL
EOSINOPHIL NFR BLD AUTO: 2.3 %
EOSINOPHILS ABSOLUTE: 0.3 THOU/MM3 (ref 0–0.4)
ERYTHROCYTE [DISTWIDTH] IN BLOOD BY AUTOMATED COUNT: 17.3 % (ref 11.5–14.5)
GFR SERPL CREATININE-BSD FRML MDRD: > 60 ML/MIN/1.73M2
GLUCOSE SERPL-MCNC: 97 MG/DL (ref 70–108)
HCT VFR BLD AUTO: 25.9 % (ref 37–47)
HGB BLD-MCNC: 8 GM/DL (ref 12–16)
HYPOCHROMIA BLD QL SMEAR: PRESENT
IMM GRANULOCYTES # BLD AUTO: 0.35 THOU/MM3 (ref 0–0.07)
IMM GRANULOCYTES NFR BLD AUTO: 2.3 %
LYMPHOCYTES ABSOLUTE: 2 THOU/MM3 (ref 1–4.8)
LYMPHOCYTES NFR BLD AUTO: 12.9 %
MCH RBC QN AUTO: 23.2 PG (ref 26–33)
MCHC RBC AUTO-ENTMCNC: 30.9 GM/DL (ref 32.2–35.5)
MCV RBC AUTO: 75.1 FL (ref 81–99)
MONOCYTES ABSOLUTE: 0.9 THOU/MM3 (ref 0.4–1.3)
MONOCYTES NFR BLD AUTO: 5.7 %
NEUTROPHILS NFR BLD AUTO: 76.5 %
NRBC BLD AUTO-RTO: 0 /100 WBC
PATHOLOGIST REVIEW: ABNORMAL
PLATELET # BLD AUTO: 1151 THOU/MM3 (ref 130–400)
PLATELET BLD QL SMEAR: ABNORMAL
PMV BLD AUTO: 9.5 FL (ref 9.4–12.4)
POIKILOCYTES: ABNORMAL
POTASSIUM SERPL-SCNC: 3.8 MEQ/L (ref 3.5–5.2)
RBC # BLD AUTO: 3.45 MILL/MM3 (ref 4.2–5.4)
SCAN OF BLOOD SMEAR: NORMAL
SCHISTOCYTES BLD QL SMEAR: ABNORMAL
SEGMENTED NEUTROPHILS ABSOLUTE COUNT: 11.6 THOU/MM3 (ref 1.8–7.7)
SODIUM SERPL-SCNC: 133 MEQ/L (ref 135–145)
WBC # BLD AUTO: 15.2 THOU/MM3 (ref 4.8–10.8)

## 2023-10-07 PROCEDURE — 2580000003 HC RX 258: Performed by: INTERNAL MEDICINE

## 2023-10-07 PROCEDURE — 2580000003 HC RX 258: Performed by: PHYSICIAN ASSISTANT

## 2023-10-07 PROCEDURE — 80048 BASIC METABOLIC PNL TOTAL CA: CPT

## 2023-10-07 PROCEDURE — 6360000002 HC RX W HCPCS: Performed by: INTERNAL MEDICINE

## 2023-10-07 PROCEDURE — 6360000002 HC RX W HCPCS

## 2023-10-07 PROCEDURE — 6370000000 HC RX 637 (ALT 250 FOR IP)

## 2023-10-07 PROCEDURE — 2060000000 HC ICU INTERMEDIATE R&B

## 2023-10-07 PROCEDURE — 36415 COLL VENOUS BLD VENIPUNCTURE: CPT

## 2023-10-07 PROCEDURE — 99233 SBSQ HOSP IP/OBS HIGH 50: CPT

## 2023-10-07 PROCEDURE — 85025 COMPLETE CBC W/AUTO DIFF WBC: CPT

## 2023-10-07 PROCEDURE — 6370000000 HC RX 637 (ALT 250 FOR IP): Performed by: PHYSICIAN ASSISTANT

## 2023-10-07 RX ORDER — ASPIRIN 81 MG/1
81 TABLET, CHEWABLE ORAL DAILY
Status: DISCONTINUED | OUTPATIENT
Start: 2023-10-07 | End: 2023-10-18 | Stop reason: HOSPADM

## 2023-10-07 RX ADMIN — ASPIRIN 81 MG CHEWABLE TABLET 81 MG: 81 TABLET CHEWABLE at 12:47

## 2023-10-07 RX ADMIN — PROCHLORPERAZINE EDISYLATE 10 MG: 5 INJECTION INTRAMUSCULAR; INTRAVENOUS at 12:02

## 2023-10-07 RX ADMIN — LINEZOLID 600 MG: 600 INJECTION, SOLUTION INTRAVENOUS at 12:46

## 2023-10-07 RX ADMIN — MEROPENEM 1000 MG: 1 INJECTION, POWDER, FOR SOLUTION INTRAVENOUS at 13:51

## 2023-10-07 RX ADMIN — ENOXAPARIN SODIUM 40 MG: 100 INJECTION SUBCUTANEOUS at 12:01

## 2023-10-07 RX ADMIN — PROCHLORPERAZINE EDISYLATE 10 MG: 5 INJECTION INTRAMUSCULAR; INTRAVENOUS at 18:34

## 2023-10-07 RX ADMIN — SODIUM CHLORIDE: 9 INJECTION, SOLUTION INTRAVENOUS at 04:09

## 2023-10-07 RX ADMIN — ONDANSETRON 4 MG: 2 INJECTION INTRAMUSCULAR; INTRAVENOUS at 09:27

## 2023-10-07 RX ADMIN — MEROPENEM 1000 MG: 1 INJECTION, POWDER, FOR SOLUTION INTRAVENOUS at 21:45

## 2023-10-07 RX ADMIN — LINEZOLID 600 MG: 600 INJECTION, SOLUTION INTRAVENOUS at 02:02

## 2023-10-07 RX ADMIN — MEROPENEM 1000 MG: 1 INJECTION, POWDER, FOR SOLUTION INTRAVENOUS at 05:56

## 2023-10-07 RX ADMIN — FERROUS SULFATE TAB 325 MG (65 MG ELEMENTAL FE) 325 MG: 325 (65 FE) TAB at 16:45

## 2023-10-07 ASSESSMENT — PAIN DESCRIPTION - LOCATION: LOCATION: ABDOMEN

## 2023-10-07 ASSESSMENT — PAIN SCALES - GENERAL: PAINLEVEL_OUTOF10: 6

## 2023-10-07 ASSESSMENT — PAIN SCALES - WONG BAKER: WONGBAKER_NUMERICALRESPONSE: 4

## 2023-10-07 NOTE — PROGRESS NOTES
IV; the patient was sedated during this procedure,  and monitored with EKG and pulse ox monitoring devices by a registered nurse. Face-to-face time with patient 15 minutes. PROCEDURE: Signed informed consent was obtained prior to performing this procedure. The patient was placed on the CT scanner and sedated, as indicated above. ALL CT SCANS AT THIS FACILITY use dose modulation, iterative reconstruction, and/or weight-based dosing when appropriate to reduce radiation dose to as low as reasonably achievable. CT images were initially obtained to determine appropriate puncture site. The skin was marked, prepped, and draped in a sterile fashion. Following local anesthesia and utilizing aseptic technique, a needle was successfully passed into the abscess pocket. A small amount of fluid was aspirated to confirm appropriate needle position. A guidewire was then passed through the needle followed by insertion of progressively larger dilators up to an 8 [place] size. An 8 [place] multi-side-hole drainage catheter was  then passed over the wire and was coiled within the abscess pocket. The retaining suture was then locked in the standard fashion. Catheter was then hooked to a Des-Close drainage bag and the catheter was flushed several times with sterile saline. The catheter was stabilized to the skin with a Percu-Stay device. A sample of the fluid was sent to laboratory for culture and sensitivity testing. Status post successful abscess drainage procedure. . **This report has been created using voice recognition software. It may contain minor errors which are inherent in voice recognition technology. ** Final report electronically signed by Dr Barrie Martinez on 9/30/2023 3:23 PM    CT GUIDED NEEDLE PLACEMENT    Result Date: 9/30/2023  CT-GUIDED ABSCESS DRAINAGE PERFORMED BY: Yaya Ace. SARAH Samson: Peritoneal cavity APPROACH: Anterior right CATHETER: 10 [place] multipurpose drainage catheter.  FLUID kidney. There is a cyst in the lower pole of the left kidney measuring 1.2 cm in diameter. Density changes in the gallbladder suggest layering sludge. No small bowel obstruction or obvious focal colitis. Small bowel in the left lower quadrant appears to have some mild surrounding stranding which may be reactive to the acute changes elsewhere in the abdomen and pelvis. Impression: 1. Multifocal abscess/hematoma postoperatively, see above for discussion. 2. Gallbladder sludge. 3. Nonspecific inflammation of the small bowel in the left lower quadrant. This document has been electronically signed by: Ming Bang MD on 09/30/2023 01:52 AM All CTs at this facility use dose modulation techniques and iterative reconstructions, and/or weight-based dosing when appropriate to reduce radiation to a low as reasonably achievable.       Electronically signed by VALDO Sawant CNP on 10/7/2023 at 2:34 PM

## 2023-10-07 NOTE — PROGRESS NOTES
Messaged on call [facility] regarding critical lab value for patient's platelet count. No new orders at this time.

## 2023-10-07 NOTE — PLAN OF CARE
Problem: Pain  Goal: Verbalizes/displays adequate comfort level or baseline comfort level    10/6/2023 2347 by Sarina Morris RN  Outcome: Progressing     Problem: Safety - Adult  Goal: Free from fall injury    10/6/2023 2347 by Sarina Morris RN  Outcome: Progressing     Problem: Skin/Tissue Integrity - Adult  Goal: Skin integrity remains intact    10/6/2023 2347 by Sarina Morris RN  Outcome: Progressing     Problem: Skin/Tissue Integrity - Adult  Goal: Incisions, wounds, or drain sites healing without S/S of infection  10/6/2023 2347 by Sarina Morris RN  Outcome: Progressing  10/6/2023 2347 by Sarina Morris RN  Outcome: Progressing     Problem: Infection - Adult  Goal: Absence of infection at discharge    10/6/2023 2347 by Sarina Morris RN  Outcome: Progressing     Problem: Infection - Adult  Goal: Absence of infection during hospitalization    10/6/2023 2347 by Sarina Morris RN  Outcome: Progressing     Problem: Skin/Tissue Integrity  Goal: Absence of new skin breakdown  Description: 1. Monitor for areas of redness and/or skin breakdown  2. Assess vascular access sites hourly  3. Every 4-6 hours minimum:  Change oxygen saturation probe site  4. Every 4-6 hours:  If on nasal continuous positive airway pressure, respiratory therapy assess nares and determine need for appliance change or resting period. 10/6/2023 2347 by Sarina Morris RN  Outcome: Progressing     Problem: Cardiovascular - Adult  Goal: Maintains optimal cardiac output and hemodynamic stability    10/6/2023 2347 by Sarina Morris RN  Outcome: Progressing     Problem: Hematologic - Adult  Goal: Maintains hematologic stability    10/6/2023 2347 by Sarina Morris RN  Outcome: Progressing   Care plan reviewed with patient. Patient verbalize understanding of the plan of care and contribute to goal setting.

## 2023-10-07 NOTE — PROGRESS NOTES
Pt amb to bathroom to void and HR jumped to 150's. C/o nausea and SOB, denied any pain meds and refused to try any po meds.

## 2023-10-07 NOTE — PLAN OF CARE
Pt needs increase PO intake and nausea to dissipate with meds changed to accommodate, pt needs encouragement to attempt PO intake.     Problem: Discharge Planning  Goal: Discharge to home or other facility with appropriate resources  Outcome: Not Progressing  Flowsheets (Taken 10/7/2023 0815)  Discharge to home or other facility with appropriate resources:   Identify barriers to discharge with patient and caregiver   Arrange for needed discharge resources and transportation as appropriate     Problem: Gastrointestinal - Adult  Goal: Maintains or returns to baseline bowel function  Outcome: Not Progressing  Goal: Minimal or absence of nausea and vomiting  Outcome: Not Progressing  Goal: Maintains adequate nutritional intake  Outcome: Not Progressing

## 2023-10-07 NOTE — FLOWSHEET NOTE
10/07/23 6175   Safe Environment   Safety Measures Call light within reach;Standard Safety Measures; Bed in low position  (virtual nurse safety round completed)     Patient is safe in bed with no concerns or needs at the moment. Thank you.

## 2023-10-08 LAB
ANION GAP SERPL CALC-SCNC: 12 MEQ/L (ref 8–16)
BASOPHILS ABSOLUTE: 0.1 THOU/MM3 (ref 0–0.1)
BASOPHILS NFR BLD AUTO: 0.4 %
BUN SERPL-MCNC: 7 MG/DL (ref 7–22)
CALCIUM SERPL-MCNC: 8.1 MG/DL (ref 8.5–10.5)
CHLORIDE SERPL-SCNC: 104 MEQ/L (ref 98–111)
CO2 SERPL-SCNC: 24 MEQ/L (ref 23–33)
CREAT SERPL-MCNC: 0.8 MG/DL (ref 0.4–1.2)
DEPRECATED RDW RBC AUTO: 46.2 FL (ref 35–45)
EOSINOPHIL NFR BLD AUTO: 2.3 %
EOSINOPHILS ABSOLUTE: 0.3 THOU/MM3 (ref 0–0.4)
ERYTHROCYTE [DISTWIDTH] IN BLOOD BY AUTOMATED COUNT: 17.5 % (ref 11.5–14.5)
GFR SERPL CREATININE-BSD FRML MDRD: > 60 ML/MIN/1.73M2
GLUCOSE SERPL-MCNC: 102 MG/DL (ref 70–108)
HCT VFR BLD AUTO: 25.2 % (ref 37–47)
HGB BLD-MCNC: 7.8 GM/DL (ref 12–16)
IMM GRANULOCYTES # BLD AUTO: 0.25 THOU/MM3 (ref 0–0.07)
IMM GRANULOCYTES NFR BLD AUTO: 1.6 %
LYMPHOCYTES ABSOLUTE: 1.8 THOU/MM3 (ref 1–4.8)
LYMPHOCYTES NFR BLD AUTO: 11.7 %
MCH RBC QN AUTO: 23.1 PG (ref 26–33)
MCHC RBC AUTO-ENTMCNC: 31 GM/DL (ref 32.2–35.5)
MCV RBC AUTO: 74.6 FL (ref 81–99)
MONOCYTES ABSOLUTE: 0.9 THOU/MM3 (ref 0.4–1.3)
MONOCYTES NFR BLD AUTO: 6.2 %
NEUTROPHILS NFR BLD AUTO: 77.8 %
NRBC BLD AUTO-RTO: 0 /100 WBC
PLATELET # BLD AUTO: 1255 THOU/MM3 (ref 130–400)
PMV BLD AUTO: 9.2 FL (ref 9.4–12.4)
POTASSIUM SERPL-SCNC: 3.7 MEQ/L (ref 3.5–5.2)
RBC # BLD AUTO: 3.38 MILL/MM3 (ref 4.2–5.4)
SEGMENTED NEUTROPHILS ABSOLUTE COUNT: 11.8 THOU/MM3 (ref 1.8–7.7)
SODIUM SERPL-SCNC: 140 MEQ/L (ref 135–145)
WBC # BLD AUTO: 15.2 THOU/MM3 (ref 4.8–10.8)

## 2023-10-08 PROCEDURE — 2580000003 HC RX 258: Performed by: PHYSICIAN ASSISTANT

## 2023-10-08 PROCEDURE — 6360000002 HC RX W HCPCS

## 2023-10-08 PROCEDURE — 80048 BASIC METABOLIC PNL TOTAL CA: CPT

## 2023-10-08 PROCEDURE — 6370000000 HC RX 637 (ALT 250 FOR IP)

## 2023-10-08 PROCEDURE — 2580000003 HC RX 258

## 2023-10-08 PROCEDURE — 6360000002 HC RX W HCPCS: Performed by: INTERNAL MEDICINE

## 2023-10-08 PROCEDURE — 85025 COMPLETE CBC W/AUTO DIFF WBC: CPT

## 2023-10-08 PROCEDURE — 2580000003 HC RX 258: Performed by: INTERNAL MEDICINE

## 2023-10-08 PROCEDURE — 99232 SBSQ HOSP IP/OBS MODERATE 35: CPT

## 2023-10-08 PROCEDURE — 36415 COLL VENOUS BLD VENIPUNCTURE: CPT

## 2023-10-08 PROCEDURE — 6370000000 HC RX 637 (ALT 250 FOR IP): Performed by: PHYSICIAN ASSISTANT

## 2023-10-08 PROCEDURE — 2060000000 HC ICU INTERMEDIATE R&B

## 2023-10-08 RX ORDER — SODIUM CHLORIDE, SODIUM LACTATE, POTASSIUM CHLORIDE, CALCIUM CHLORIDE 600; 310; 30; 20 MG/100ML; MG/100ML; MG/100ML; MG/100ML
INJECTION, SOLUTION INTRAVENOUS CONTINUOUS
Status: ACTIVE | OUTPATIENT
Start: 2023-10-08 | End: 2023-10-09

## 2023-10-08 RX ADMIN — MEROPENEM 1000 MG: 1 INJECTION, POWDER, FOR SOLUTION INTRAVENOUS at 06:16

## 2023-10-08 RX ADMIN — SODIUM CHLORIDE, POTASSIUM CHLORIDE, SODIUM LACTATE AND CALCIUM CHLORIDE: 600; 310; 30; 20 INJECTION, SOLUTION INTRAVENOUS at 13:59

## 2023-10-08 RX ADMIN — SODIUM CHLORIDE: 9 INJECTION, SOLUTION INTRAVENOUS at 04:40

## 2023-10-08 RX ADMIN — LINEZOLID 600 MG: 600 INJECTION, SOLUTION INTRAVENOUS at 13:36

## 2023-10-08 RX ADMIN — MEROPENEM 1000 MG: 1 INJECTION, POWDER, FOR SOLUTION INTRAVENOUS at 21:41

## 2023-10-08 RX ADMIN — PROCHLORPERAZINE EDISYLATE 10 MG: 5 INJECTION INTRAMUSCULAR; INTRAVENOUS at 07:44

## 2023-10-08 RX ADMIN — ASPIRIN 81 MG CHEWABLE TABLET 81 MG: 81 TABLET CHEWABLE at 08:32

## 2023-10-08 RX ADMIN — PROCHLORPERAZINE EDISYLATE 10 MG: 5 INJECTION INTRAMUSCULAR; INTRAVENOUS at 20:41

## 2023-10-08 RX ADMIN — ENOXAPARIN SODIUM 40 MG: 100 INJECTION SUBCUTANEOUS at 08:35

## 2023-10-08 RX ADMIN — FERROUS SULFATE TAB 325 MG (65 MG ELEMENTAL FE) 325 MG: 325 (65 FE) TAB at 08:33

## 2023-10-08 RX ADMIN — MEROPENEM 1000 MG: 1 INJECTION, POWDER, FOR SOLUTION INTRAVENOUS at 14:51

## 2023-10-08 RX ADMIN — PROCHLORPERAZINE EDISYLATE 10 MG: 5 INJECTION INTRAMUSCULAR; INTRAVENOUS at 13:38

## 2023-10-08 RX ADMIN — PROCHLORPERAZINE EDISYLATE 10 MG: 5 INJECTION INTRAMUSCULAR; INTRAVENOUS at 01:06

## 2023-10-08 RX ADMIN — LINEZOLID 600 MG: 600 INJECTION, SOLUTION INTRAVENOUS at 02:06

## 2023-10-08 NOTE — FLOWSHEET NOTE
10/08/23 1040   Safe Environment   Safety Measures Other (comment)  (VN safety round)     VN called into patients room and introduced myself and role. Patient did not answer. Video activated for safety check. . Patient resting comfortably in bed. . Patient has call light within reach. No obvious signs of distress seen at this time.

## 2023-10-08 NOTE — PLAN OF CARE
Problem: Discharge Planning  Goal: Discharge to home or other facility with appropriate resources  10/7/2023 2318 by Alejandro Keating RN  Outcome: Progressing  10/7/2023 1458 by Edie Subramanian RN  Outcome: Not Progressing  Flowsheets (Taken 10/7/2023 0815)  Discharge to home or other facility with appropriate resources:   Identify barriers to discharge with patient and caregiver   Arrange for needed discharge resources and transportation as appropriate     Problem: Pain  Goal: Verbalizes/displays adequate comfort level or baseline comfort level  10/7/2023 2318 by Alejandro Keating RN  Outcome: Progressing  10/7/2023 1458 by Edie Subramanian RN  Outcome: Progressing  Flowsheets (Taken 10/7/2023 0815)  Verbalizes/displays adequate comfort level or baseline comfort level:   Encourage patient to monitor pain and request assistance   Assess pain using appropriate pain scale     Problem: Safety - Adult  Goal: Free from fall injury  10/7/2023 2318 by Alejandro Keating RN  Outcome: Progressing  10/7/2023 1458 by Edie Subramanian RN  Outcome: Progressing     Problem: Skin/Tissue Integrity - Adult  Goal: Skin integrity remains intact  10/7/2023 2318 by Alejandro Keating RN  Outcome: Progressing  10/7/2023 1458 by Edie Subramanian RN  Outcome: Progressing  Flowsheets (Taken 10/7/2023 0815)  Skin Integrity Remains Intact:   Monitor for areas of redness and/or skin breakdown   Assess vascular access sites hourly  Goal: Incisions, wounds, or drain sites healing without S/S of infection  10/7/2023 2318 by Alejandro Keating RN  Outcome: Progressing  10/7/2023 1458 by Edie Subramanian RN  Outcome: Progressing  Flowsheets (Taken 10/7/2023 0815)  Incisions, Wounds, or Drain Sites Healing Without Sign and Symptoms of Infection: TWICE DAILY: Assess and document skin integrity     Problem: Infection - Adult  Goal: Absence of infection at discharge  10/7/2023 2318 by Alejandro Keating RN  Outcome: Progressing  10/7/2023 Progressing     Problem: Cardiovascular - Adult  Goal: Maintains optimal cardiac output and hemodynamic stability  10/7/2023 2318 by Lowell Gan RN  Outcome: Progressing  10/7/2023 1458 by Charanjit Champion RN  Outcome: Progressing  Flowsheets (Taken 10/7/2023 0815)  Maintains optimal cardiac output and hemodynamic stability:   Monitor blood pressure and heart rate   Monitor urine output and notify Licensed Independent Practitioner for values outside of normal range  Goal: Absence of cardiac dysrhythmias or at baseline  10/7/2023 2318 by Lowell Gan RN  Outcome: Progressing  10/7/2023 1458 by Charanjit Champion RN  Outcome: Progressing  Flowsheets (Taken 10/7/2023 0815)  Absence of cardiac dysrhythmias or at baseline:   Monitor cardiac rate and rhythm   Assess for signs of decreased cardiac output     Problem: Musculoskeletal - Adult  Goal: Return mobility to safest level of function  10/7/2023 2318 by Lowell Gna RN  Outcome: Progressing  10/7/2023 1458 by Charanjit Champion RN  Outcome: Progressing  Flowsheets (Taken 10/7/2023 0815)  Return Mobility to Safest Level of Function:   Assess patient stability and activity tolerance for standing, transferring and ambulating with or without assistive devices   Assist with transfers and ambulation using safe patient handling equipment as needed  Goal: Maintain proper alignment of affected body part  10/7/2023 2318 by Lowell Gan RN  Outcome: Progressing  10/7/2023 1458 by Charanjit Champion RN  Outcome: Progressing  Flowsheets (Taken 10/7/2023 0815)  Maintain proper alignment of affected body part:   Support and protect limb and body alignment per provider's orders   Instruct and reinforce with patient and family use of appropriate assistive device and precautions (e.g. spinal or hip dislocation precautions)     Problem: Hematologic - Adult  Goal: Maintains hematologic stability  Outcome: Progressing     Care plan reviewed with patient.   Patient

## 2023-10-08 NOTE — PROGRESS NOTES
which are inherent in voice recognition technology. ** Final report electronically signed by Dr Kurtis Garcia on 9/30/2023 3:23 PM    CT GUIDED NEEDLE PLACEMENT    Result Date: 9/30/2023  CT-GUIDED ABSCESS DRAINAGE PERFORMED BY: Zayda Fish. SARAH Alvarenga Console: Peritoneal cavity APPROACH: Anterior right CATHETER: 10 [place] multipurpose drainage catheter. FLUID OBTAINED: Dark reddish colored fluid SEDATION: Versed 2 mg; fentanyl 100 mcg, IV; the patient was sedated during this procedure,  and monitored with EKG and pulse ox monitoring devices by a registered nurse. Face-to-face time with patient 15 minutes. PROCEDURE: Signed informed consent was obtained prior to performing this procedure. The patient was placed on the CT scanner and sedated, as indicated above. ALL CT SCANS AT THIS FACILITY use dose modulation, iterative reconstruction, and/or weight-based dosing when appropriate to reduce radiation dose to as low as reasonably achievable. CT images were initially obtained to determine appropriate puncture site. The skin was marked, prepped, and draped in a sterile fashion. Following local anesthesia and utilizing aseptic technique, a needle was successfully passed into the abscess pocket. A small amount of fluid was aspirated to confirm appropriate needle position. A guidewire was then passed through the needle followed by insertion of progressively larger dilators up to an 8 [place] size. An 8 [place] multi-side-hole drainage catheter was  then passed over the wire and was coiled within the abscess pocket. The retaining suture was then locked in the standard fashion. Catheter was then hooked to a Des-Close drainage bag and the catheter was flushed several times with sterile saline. The catheter was stabilized to the skin with a Percu-Stay device. A sample of the fluid was sent to laboratory for culture and sensitivity testing. Status post successful abscess drainage procedure. . **This report has been of hematoma/blood products. Right upper quadrant fluid collection is also contiguous with fluid extending into the anterior abdominal wall musculature anterolaterally. This collection extends through the muscle layers and into subcutaneous tissues through might have been prior port tract. No focal pathology through the liver, spleen, pancreas, adrenals, and right kidney. There is a cyst in the lower pole of the left kidney measuring 1.2 cm in diameter. Density changes in the gallbladder suggest layering sludge. No small bowel obstruction or obvious focal colitis. Small bowel in the left lower quadrant appears to have some mild surrounding stranding which may be reactive to the acute changes elsewhere in the abdomen and pelvis. Impression: 1. Multifocal abscess/hematoma postoperatively, see above for discussion. 2. Gallbladder sludge. 3. Nonspecific inflammation of the small bowel in the left lower quadrant. This document has been electronically signed by: Martha Kline MD on 09/30/2023 01:52 AM All CTs at this facility use dose modulation techniques and iterative reconstructions, and/or weight-based dosing when appropriate to reduce radiation to a low as reasonably achievable.       Electronically signed by VALDO Chavez CNP on 10/8/2023 at 1:04 PM

## 2023-10-08 NOTE — FLOWSHEET NOTE
10/08/23 6919   Safe Environment   Safety Measures Other (comment)  (VN safety round)     VN called into patients room and introduced myself and role. Patient answered and permitted video. Video activated. . Patient up in chair. . Patient denied needs at this time. Staff in room at this time.

## 2023-10-09 LAB
ANION GAP SERPL CALC-SCNC: 11 MEQ/L (ref 8–16)
BASOPHILS ABSOLUTE: 0 THOU/MM3 (ref 0–0.1)
BASOPHILS NFR BLD AUTO: 0.3 %
BUN SERPL-MCNC: 7 MG/DL (ref 7–22)
CALCIUM SERPL-MCNC: 8.3 MG/DL (ref 8.5–10.5)
CHLORIDE SERPL-SCNC: 103 MEQ/L (ref 98–111)
CO2 SERPL-SCNC: 25 MEQ/L (ref 23–33)
CREAT SERPL-MCNC: 0.7 MG/DL (ref 0.4–1.2)
DEPRECATED RDW RBC AUTO: 47 FL (ref 35–45)
EOSINOPHIL NFR BLD AUTO: 2.5 %
EOSINOPHILS ABSOLUTE: 0.4 THOU/MM3 (ref 0–0.4)
ERYTHROCYTE [DISTWIDTH] IN BLOOD BY AUTOMATED COUNT: 17.6 % (ref 11.5–14.5)
FUNGUS SPEC CULT: NORMAL
FUNGUS SPEC FUNGUS STN: NORMAL
GFR SERPL CREATININE-BSD FRML MDRD: > 60 ML/MIN/1.73M2
GLUCOSE SERPL-MCNC: 106 MG/DL (ref 70–108)
HCT VFR BLD AUTO: 24.9 % (ref 37–47)
HGB BLD-MCNC: 7.7 GM/DL (ref 12–16)
IMM GRANULOCYTES # BLD AUTO: 0.21 THOU/MM3 (ref 0–0.07)
IMM GRANULOCYTES NFR BLD AUTO: 1.4 %
LYMPHOCYTES ABSOLUTE: 2.1 THOU/MM3 (ref 1–4.8)
LYMPHOCYTES NFR BLD AUTO: 13.7 %
MCH RBC QN AUTO: 23.4 PG (ref 26–33)
MCHC RBC AUTO-ENTMCNC: 30.9 GM/DL (ref 32.2–35.5)
MCV RBC AUTO: 75.7 FL (ref 81–99)
MONOCYTES ABSOLUTE: 0.9 THOU/MM3 (ref 0.4–1.3)
MONOCYTES NFR BLD AUTO: 6.1 %
NEUTROPHILS NFR BLD AUTO: 76 %
NRBC BLD AUTO-RTO: 0 /100 WBC
PLATELET # BLD AUTO: 1256 THOU/MM3 (ref 130–400)
PMV BLD AUTO: 9 FL (ref 9.4–12.4)
POTASSIUM SERPL-SCNC: 3.4 MEQ/L (ref 3.5–5.2)
POTASSIUM SERPL-SCNC: 3.8 MEQ/L (ref 3.5–5.2)
RBC # BLD AUTO: 3.29 MILL/MM3 (ref 4.2–5.4)
SEGMENTED NEUTROPHILS ABSOLUTE COUNT: 11.6 THOU/MM3 (ref 1.8–7.7)
SODIUM SERPL-SCNC: 139 MEQ/L (ref 135–145)
WBC # BLD AUTO: 15.3 THOU/MM3 (ref 4.8–10.8)

## 2023-10-09 PROCEDURE — 6370000000 HC RX 637 (ALT 250 FOR IP)

## 2023-10-09 PROCEDURE — 6360000002 HC RX W HCPCS

## 2023-10-09 PROCEDURE — 84132 ASSAY OF SERUM POTASSIUM: CPT

## 2023-10-09 PROCEDURE — 2580000003 HC RX 258

## 2023-10-09 PROCEDURE — 2060000000 HC ICU INTERMEDIATE R&B

## 2023-10-09 PROCEDURE — 85025 COMPLETE CBC W/AUTO DIFF WBC: CPT

## 2023-10-09 PROCEDURE — 99233 SBSQ HOSP IP/OBS HIGH 50: CPT

## 2023-10-09 PROCEDURE — 6360000002 HC RX W HCPCS: Performed by: INTERNAL MEDICINE

## 2023-10-09 PROCEDURE — 80048 BASIC METABOLIC PNL TOTAL CA: CPT

## 2023-10-09 PROCEDURE — 2580000003 HC RX 258: Performed by: INTERNAL MEDICINE

## 2023-10-09 PROCEDURE — 87070 CULTURE OTHR SPECIMN AEROBIC: CPT

## 2023-10-09 PROCEDURE — 87205 SMEAR GRAM STAIN: CPT

## 2023-10-09 PROCEDURE — 36415 COLL VENOUS BLD VENIPUNCTURE: CPT

## 2023-10-09 RX ADMIN — LINEZOLID 600 MG: 600 INJECTION, SOLUTION INTRAVENOUS at 12:44

## 2023-10-09 RX ADMIN — ASPIRIN 81 MG CHEWABLE TABLET 81 MG: 81 TABLET CHEWABLE at 08:50

## 2023-10-09 RX ADMIN — ENOXAPARIN SODIUM 40 MG: 100 INJECTION SUBCUTANEOUS at 08:50

## 2023-10-09 RX ADMIN — LINEZOLID 600 MG: 600 INJECTION, SOLUTION INTRAVENOUS at 02:11

## 2023-10-09 RX ADMIN — SODIUM CHLORIDE, PRESERVATIVE FREE 10 ML: 5 INJECTION INTRAVENOUS at 08:56

## 2023-10-09 RX ADMIN — SODIUM CHLORIDE, POTASSIUM CHLORIDE, SODIUM LACTATE AND CALCIUM CHLORIDE: 600; 310; 30; 20 INJECTION, SOLUTION INTRAVENOUS at 05:22

## 2023-10-09 RX ADMIN — POTASSIUM CHLORIDE 10 MEQ: 7.46 INJECTION, SOLUTION INTRAVENOUS at 11:13

## 2023-10-09 RX ADMIN — MEROPENEM 1000 MG: 1 INJECTION, POWDER, FOR SOLUTION INTRAVENOUS at 13:52

## 2023-10-09 RX ADMIN — POTASSIUM CHLORIDE 10 MEQ: 7.46 INJECTION, SOLUTION INTRAVENOUS at 13:51

## 2023-10-09 RX ADMIN — POTASSIUM CHLORIDE 10 MEQ: 7.46 INJECTION, SOLUTION INTRAVENOUS at 12:23

## 2023-10-09 RX ADMIN — MEROPENEM 1000 MG: 1 INJECTION, POWDER, FOR SOLUTION INTRAVENOUS at 05:24

## 2023-10-09 RX ADMIN — MEROPENEM 1000 MG: 1 INJECTION, POWDER, FOR SOLUTION INTRAVENOUS at 21:27

## 2023-10-09 RX ADMIN — PROCHLORPERAZINE EDISYLATE 10 MG: 5 INJECTION INTRAMUSCULAR; INTRAVENOUS at 08:49

## 2023-10-09 RX ADMIN — SODIUM CHLORIDE, PRESERVATIVE FREE 10 ML: 5 INJECTION INTRAVENOUS at 21:27

## 2023-10-09 RX ADMIN — POTASSIUM CHLORIDE 10 MEQ: 7.46 INJECTION, SOLUTION INTRAVENOUS at 15:51

## 2023-10-09 RX ADMIN — PROCHLORPERAZINE EDISYLATE 10 MG: 5 INJECTION INTRAMUSCULAR; INTRAVENOUS at 14:58

## 2023-10-09 ASSESSMENT — PAIN DESCRIPTION - PAIN TYPE: TYPE: SURGICAL PAIN;ACUTE PAIN

## 2023-10-09 ASSESSMENT — PAIN DESCRIPTION - LOCATION
LOCATION: ABDOMEN
LOCATION: ABDOMEN

## 2023-10-09 ASSESSMENT — PAIN DESCRIPTION - ORIENTATION: ORIENTATION: RIGHT

## 2023-10-09 ASSESSMENT — PAIN SCALES - GENERAL
PAINLEVEL_OUTOF10: 6
PAINLEVEL_OUTOF10: 5
PAINLEVEL_OUTOF10: 6
PAINLEVEL_OUTOF10: 6

## 2023-10-09 ASSESSMENT — PAIN DESCRIPTION - DESCRIPTORS: DESCRIPTORS: DISCOMFORT

## 2023-10-09 ASSESSMENT — PAIN DESCRIPTION - FREQUENCY: FREQUENCY: CONTINUOUS

## 2023-10-09 ASSESSMENT — PAIN DESCRIPTION - ONSET: ONSET: ON-GOING

## 2023-10-09 ASSESSMENT — PAIN - FUNCTIONAL ASSESSMENT: PAIN_FUNCTIONAL_ASSESSMENT: ACTIVITIES ARE NOT PREVENTED

## 2023-10-09 NOTE — FLOWSHEET NOTE
10/09/23 9030   Safe Environment   Safety Measures Other (comment)  (VN safety round)     VN called into patients room and introduced myself and role. Staff announced they were in the room and pt is in the bathroom at this time.

## 2023-10-09 NOTE — PROGRESS NOTES
[facility]--. [address] [facility] PROGRESS NOTE      Patient: Louann Montalvo  Room #: 0T-47/876-N            YOB: 2000  Age: 25 y.o. Gender: adult            Admit Date & Time: 9/30/2023 12:11 AM    Assessment:    The patient declined a visit at this time    Interventions: The patient was provided information about Spiritual Care being available. Outcomes: The  politely wished the patient a positive day. Plan: 1. Spiritual care will continue to follow the patient according to [facility]'s spiritual care SOP.        Electronically signed by Mariana Nieto on 10/9/2023 at 3:14 PM.  [facility]  [phone]     10/09/23 1513   Encounter Summary   Encounter Overview/Reason  Follow-up   Service Provided For: Patient   [facility]   Support System Unknown   Last Encounter  10/09/23   Complexity of Encounter Low   Begin Time 1450   End Time  1451   Total Time Calculated 1 min   Spiritual/Emotional needs   Type Spiritual Support   Assessment/Intervention/Outcome   Assessment Unable to assess   Intervention Sustaining Presence/Ministry of presence   Outcome Refused/Declined

## 2023-10-09 NOTE — CARE COORDINATION
10/9/23, 9:20 AM EDT    DISCHARGE  [address] [place] day: 7  Location: [facility]27/027A Reason for admit: Elevated C-reactive protein (CRP) [R79.82]  Postoperative intra-abdominal abscess [T81.43XA]  Abscess of abdominal cavity (HCC) [K65.1]  SIRS (systemic inflammatory response syndrome) (HCC) [R65.10]  Leukocytosis, unspecified type [D72.829]  Infection of deep incisional surgical site after procedure, initial encounter [T81.42XA]  Postoperative abscess [T81.49XA]   Procedure:    9/30 CT guided drain placement  10/2 CTA abd: Large fluid collection in the rectovaginal pouch measuring 10.2 x 7.3 x 11 cm in size. Status post appendectomy. Drain in the right lower quadrant. Fluid in the right lower quadrant measuring 6.8 x 4 cm in size  10/3 CT guided drainage of pelvic hematoma/abscess     Barriers to Discharge: WBC 15.3, ID following, platelets 0,493, Hgb 7.7, General Surgery following, pain and nausea control, drain management, Asa, Lovenox, ferrous sulfate, IV Zyvox, IV Merrem. PCP: No primary care provider on file. Readmission Risk Score: 10.6%  Patient Goals/Plan/Treatment Preferences:  Home with roommates.  [facility], appointment will need scheduled

## 2023-10-09 NOTE — FLOWSHEET NOTE
10/09/23 1136   Safe Environment   Safety Measures Other (comment)  (VN safety round)     VN called into patients room and introduced myself and role. Patient answered and permitted video. Video activated. . Patient resting comfortably in bed. . Patient voiced no needs or concerns at this time. Call light within reach.

## 2023-10-09 NOTE — PROGRESS NOTES
left lower quadrant appears to have some mild surrounding stranding which may be reactive to the acute changes elsewhere in the abdomen and pelvis. Impression: 1. Multifocal abscess/hematoma postoperatively, see above for discussion. 2. Gallbladder sludge. 3. Nonspecific inflammation of the small bowel in the left lower quadrant. This document has been electronically signed by: Kiera May MD on 09/30/2023 01:52 AM All CTs at this facility use dose modulation techniques and iterative reconstructions, and/or weight-based dosing when appropriate to reduce radiation to a low as reasonably achievable.       Electronically signed by VALDO Barger CNP on 10/9/2023 at 1:42 PM

## 2023-10-10 PROBLEM — T81.42XA INFECTION OF DEEP INCISIONAL SURGICAL SITE AFTER PROCEDURE: Status: ACTIVE | Noted: 2023-10-10

## 2023-10-10 LAB
ANION GAP SERPL CALC-SCNC: 13 MEQ/L (ref 8–16)
BASOPHILS ABSOLUTE: 0 THOU/MM3 (ref 0–0.1)
BASOPHILS NFR BLD AUTO: 0.3 %
BUN SERPL-MCNC: 6 MG/DL (ref 7–22)
CALCIUM SERPL-MCNC: 8.5 MG/DL (ref 8.5–10.5)
CHLORIDE SERPL-SCNC: 102 MEQ/L (ref 98–111)
CO2 SERPL-SCNC: 24 MEQ/L (ref 23–33)
CREAT SERPL-MCNC: 0.7 MG/DL (ref 0.4–1.2)
DEPRECATED RDW RBC AUTO: 48 FL (ref 35–45)
EOSINOPHIL NFR BLD AUTO: 2.9 %
EOSINOPHILS ABSOLUTE: 0.4 THOU/MM3 (ref 0–0.4)
ERYTHROCYTE [DISTWIDTH] IN BLOOD BY AUTOMATED COUNT: 18.2 % (ref 11.5–14.5)
GFR SERPL CREATININE-BSD FRML MDRD: > 60 ML/MIN/1.73M2
GLUCOSE SERPL-MCNC: 98 MG/DL (ref 70–108)
HCT VFR BLD AUTO: 24.9 % (ref 37–47)
HGB BLD-MCNC: 7.6 GM/DL (ref 12–16)
IMM GRANULOCYTES # BLD AUTO: 0.12 THOU/MM3 (ref 0–0.07)
IMM GRANULOCYTES NFR BLD AUTO: 0.9 %
LYMPHOCYTES ABSOLUTE: 2 THOU/MM3 (ref 1–4.8)
LYMPHOCYTES NFR BLD AUTO: 15.3 %
MCH RBC QN AUTO: 23.1 PG (ref 26–33)
MCHC RBC AUTO-ENTMCNC: 30.5 GM/DL (ref 32.2–35.5)
MCV RBC AUTO: 75.7 FL (ref 81–99)
MONOCYTES ABSOLUTE: 0.8 THOU/MM3 (ref 0.4–1.3)
MONOCYTES NFR BLD AUTO: 6.3 %
NEUTROPHILS NFR BLD AUTO: 74.3 %
NRBC BLD AUTO-RTO: 0 /100 WBC
PLATELET # BLD AUTO: 1277 THOU/MM3 (ref 130–400)
PMV BLD AUTO: 9 FL (ref 9.4–12.4)
POTASSIUM SERPL-SCNC: 3.8 MEQ/L (ref 3.5–5.2)
RBC # BLD AUTO: 3.29 MILL/MM3 (ref 4.2–5.4)
SEGMENTED NEUTROPHILS ABSOLUTE COUNT: 9.5 THOU/MM3 (ref 1.8–7.7)
SODIUM SERPL-SCNC: 139 MEQ/L (ref 135–145)
WBC # BLD AUTO: 12.8 THOU/MM3 (ref 4.8–10.8)

## 2023-10-10 PROCEDURE — 6360000002 HC RX W HCPCS: Performed by: INTERNAL MEDICINE

## 2023-10-10 PROCEDURE — 2060000000 HC ICU INTERMEDIATE R&B

## 2023-10-10 PROCEDURE — 85025 COMPLETE CBC W/AUTO DIFF WBC: CPT

## 2023-10-10 PROCEDURE — 99232 SBSQ HOSP IP/OBS MODERATE 35: CPT | Performed by: INTERNAL MEDICINE

## 2023-10-10 PROCEDURE — 80048 BASIC METABOLIC PNL TOTAL CA: CPT

## 2023-10-10 PROCEDURE — 6360000002 HC RX W HCPCS

## 2023-10-10 PROCEDURE — 2580000003 HC RX 258: Performed by: INTERNAL MEDICINE

## 2023-10-10 PROCEDURE — 36415 COLL VENOUS BLD VENIPUNCTURE: CPT

## 2023-10-10 PROCEDURE — 6370000000 HC RX 637 (ALT 250 FOR IP): Performed by: INTERNAL MEDICINE

## 2023-10-10 PROCEDURE — 2580000003 HC RX 258

## 2023-10-10 RX ORDER — SODIUM CHLORIDE, SODIUM LACTATE, POTASSIUM CHLORIDE, CALCIUM CHLORIDE 600; 310; 30; 20 MG/100ML; MG/100ML; MG/100ML; MG/100ML
INJECTION, SOLUTION INTRAVENOUS CONTINUOUS
Status: DISCONTINUED | OUTPATIENT
Start: 2023-10-10 | End: 2023-10-16

## 2023-10-10 RX ORDER — SCOLOPAMINE TRANSDERMAL SYSTEM 1 MG/1
1 PATCH, EXTENDED RELEASE TRANSDERMAL
Status: DISCONTINUED | OUTPATIENT
Start: 2023-10-10 | End: 2023-10-18 | Stop reason: HOSPADM

## 2023-10-10 RX ADMIN — MEROPENEM 1000 MG: 1 INJECTION, POWDER, FOR SOLUTION INTRAVENOUS at 05:25

## 2023-10-10 RX ADMIN — MEROPENEM 1000 MG: 1 INJECTION, POWDER, FOR SOLUTION INTRAVENOUS at 21:54

## 2023-10-10 RX ADMIN — MEROPENEM 1000 MG: 1 INJECTION, POWDER, FOR SOLUTION INTRAVENOUS at 15:05

## 2023-10-10 RX ADMIN — LINEZOLID 600 MG: 600 INJECTION, SOLUTION INTRAVENOUS at 01:13

## 2023-10-10 RX ADMIN — LINEZOLID 600 MG: 600 INJECTION, SOLUTION INTRAVENOUS at 13:21

## 2023-10-10 RX ADMIN — ENOXAPARIN SODIUM 40 MG: 100 INJECTION SUBCUTANEOUS at 08:48

## 2023-10-10 RX ADMIN — SODIUM CHLORIDE, PRESERVATIVE FREE 10 ML: 5 INJECTION INTRAVENOUS at 20:40

## 2023-10-10 ASSESSMENT — PAIN DESCRIPTION - LOCATION
LOCATION: ABDOMEN

## 2023-10-10 ASSESSMENT — PAIN DESCRIPTION - PAIN TYPE: TYPE: ACUTE PAIN;SURGICAL PAIN

## 2023-10-10 ASSESSMENT — PAIN SCALES - GENERAL
PAINLEVEL_OUTOF10: 5

## 2023-10-10 ASSESSMENT — PAIN DESCRIPTION - ONSET: ONSET: ON-GOING

## 2023-10-10 ASSESSMENT — PAIN DESCRIPTION - DESCRIPTORS
DESCRIPTORS: ACHING
DESCRIPTORS: ACHING
DESCRIPTORS: ACHING;DISCOMFORT

## 2023-10-10 ASSESSMENT — PAIN - FUNCTIONAL ASSESSMENT: PAIN_FUNCTIONAL_ASSESSMENT: ACTIVITIES ARE NOT PREVENTED

## 2023-10-10 ASSESSMENT — PAIN DESCRIPTION - ORIENTATION
ORIENTATION: RIGHT;LOWER

## 2023-10-10 NOTE — FLOWSHEET NOTE
10/10/23 1343   Safe Environment   Safety Measures Bed in low position;Call light within reach; Side rails X 2;Standard Safety Measures;Nurse at bedside;MD at bedside  (Virtual nurse safety round completed.)     Patient is awake and  alert and answers questions. No distress noted.

## 2023-10-10 NOTE — CARE COORDINATION
10/10/23, 8:58 AM EDT    DISCHARGE  Th [place] day: 8  Location: [facility]27/027A Reason for admit: Elevated C-reactive protein (CRP) [R79.82]  Postoperative intra-abdominal abscess [T81.43XA]  Abscess of abdominal cavity (HCC) [K65.1]  SIRS (systemic inflammatory response syndrome) (HCC) [R65.10]  Leukocytosis, unspecified type [D72.829]  Infection of deep incisional surgical site after procedure, initial encounter [T81.42XA]  Postoperative abscess [T81.49XA]   Procedure:    9/30 CT guided drain placement  10/2 CTA abd: Large fluid collection in the rectovaginal pouch measuring 10.2 x 7.3 x 11 cm in size. Status post appendectomy. Drain in the right lower quadrant. Fluid in the right lower quadrant measuring 6.8 x 4 cm in size  10/3 CT guided drainage of pelvic hematoma/abscess     Barriers to Discharge: WBC 12.8, Hgb 7.6, platelets 4,198, ID following, Dietician consult, Asa, Lovenox, ferrous sulfate, IV Zyvox, IV Merrem, pain and nausea control. PCP: No primary care provider on file. Readmission Risk Score: 10%  Patient Goals/Plan/Treatment Preferences:  Home with roommates.  [facility], appointment will need scheduled

## 2023-10-10 NOTE — PROGRESS NOTES
0715 Bedside report with night and primary nurses    0820   . Head to [address] [facility] Student Nurse    Skin  General skin appearance / Description: warm, pink, dry  Incisions / Wounds: 2 drain sites    Neuro/Head  LOC: A+O x 4  Speech: clear and appropriate  Eyes PERRLA 4 mm  Symmetry of face / tongue: symmetrical  JVD of neck: none    Chest  Heart sounds / Apical Pulse: regular, strong 93  Dyspnea: none  Lung sounds: clear throughout  Cough: none     Abdomen/ Pelvis  Bowel sounds: hypoactive x4  Passing flatus: yes  Palpation / Inspection: soft, tender LRQ  Last BM: 10/10/23  Stool assessment: n/a  Any GI issues: n/a  Urinary issues: n/a  Continence: continent  Urine color / turbidity: n/a    Extremities  Edema: none  Altered Sensation: none  Upper extremity push / pulls: strong , bilateral  Left Arm Radial Pulse: strong   Left Upper extremity Capillary Refill: <3 sec  Right Arm Radial Pulse: strong   Right Upper extremity Capillary Refill: <3 sec  Lower extremity pedal push / pulls: strong, bilateral  Left pedal pulse: strong     Left posterior tibialis pulse: strong   Left lower extremity capillary refill: <3 sec  Right pedal pulse: strong     Right posterior tibialis pulse: strong   Right lower extremity capillary refill: <3 sec  Drift of extremities: none  Pain: 5/10 LRQ      Other issues: severe nausea    1115  Reassessment shows no changes    1400  Reported off to primary nurse     [facility] /

## 2023-10-10 NOTE — PLAN OF CARE
Problem: Gastrointestinal - Adult  Goal: Minimal or absence of nausea and vomiting  Outcome: Not Progressing  Flowsheets (Taken 10/10/2023 0222)  Minimal or absence of nausea and vomiting:   Administer IV fluids as ordered to ensure adequate hydration   Administer ordered antiemetic medications as needed     Problem: Discharge Planning  Goal: Discharge to home or other facility with appropriate resources  Outcome: Progressing  Flowsheets (Taken 10/10/2023 0222)  Discharge to home or other facility with appropriate resources: Identify barriers to discharge with patient and caregiver     Problem: Pain  Goal: Verbalizes/displays adequate comfort level or baseline comfort level  Outcome: Progressing  Flowsheets (Taken 10/10/2023 0222)  Verbalizes/displays adequate comfort level or baseline comfort level: Encourage patient to monitor pain and request assistance     Problem: Safety - Adult  Goal: Free from fall injury  Outcome: Progressing  Flowsheets (Taken 10/10/2023 0222)  Free From Fall Injury: Instruct family/caregiver on patient safety     Problem: Skin/Tissue Integrity - Adult  Goal: Skin integrity remains intact  Outcome: Progressing  Goal: Incisions, wounds, or drain sites healing without S/S of infection  Outcome: Progressing  Flowsheets (Taken 10/10/2023 0222)  Incisions, Wounds, or Drain Sites Healing Without Sign and Symptoms of Infection: Implement wound care per orders     Problem: Infection - Adult  Goal: Absence of infection during hospitalization  Outcome: Progressing  Flowsheets (Taken 10/10/2023 0222)  Absence of infection during hospitalization: Assess and monitor for signs and symptoms of infection     Problem: Gastrointestinal - Adult  Goal: Minimal or absence of nausea and vomiting  Outcome: Not Progressing  Flowsheets (Taken 10/10/2023 0222)  Minimal or absence of nausea and vomiting:   Administer IV fluids as ordered to ensure adequate hydration   Administer ordered antiemetic medications as needed   Care plan reviewed with patient. Patient verbalize understanding of the plan of care and contribute to goal setting.

## 2023-10-10 NOTE — FLOWSHEET NOTE
10/10/23 1211   Safe Environment   Safety Measures Standard Safety Measures; Family at bedside; Other (comment)  (vn safety round)     Pt responds to audio , permits video , self and role identified , pt resting in bed in position of comfort , call light visible within reach , no voiced concerns or complaints .

## 2023-10-10 NOTE — PROGRESS NOTES
empiric antibiotic therapy is indicated and should include coverage for anaerobic organisms. Urinalysis:      Lab Results   Component Value Date/Time    NITRU NEGATIVE 10/02/2023 12:00 PM    WBCUA 2-4 10/02/2023 12:00 PM    BACTERIA NONE SEEN 10/02/2023 12:00 PM    RBCUA 0-2 10/02/2023 12:00 PM    BLOODU SMALL 10/02/2023 12:00 PM    SPECGRAV 1.015 10/02/2023 12:00 PM    GLUCOSEU NEGATIVE 09/21/2023 11:05 AM       Radiology:  CT GUIDED NEEDLE PLACEMENT   Final Result   Status post successful abscess/hematoma drainage procedure. .               **This report has been created using voice recognition software. It may contain minor errors which are inherent in voice recognition technology. **         Final report electronically signed by Dr. Addison Sauceda on 10/3/2023 4:40 PM      CT ABSCESS DRAINAGE W CATH PLACEMENT S&I   Final Result   Status post successful abscess/hematoma drainage procedure. .               **This report has been created using voice recognition software. It may contain minor errors which are inherent in voice recognition technology. **         Final report electronically signed by Dr. Addison Sauceda on 10/3/2023 4:40 PM      US RENAL COMPLETE   Final Result   1. Hypoechoic structure in the lower left kidney of indeterminant etiology. Malignancy cannot be excluded and multi phase contrast-enhanced CT or MRI is recommended for further evaluation. 2. Unremarkable urinary bladder. 3. Hypoechoic fluid collection in the pelvis of indeterminate etiology, possibly hemorrhagic. Final report electronically signed by Dr. Xochitl Morris on 10/2/2023 2:02 PM      CT ABDOMEN PELVIS WO CONTRAST Additional Contrast? None   Final Result      1. Large fluid collection in the rectovaginal pouch measuring 10.2 x 7.3 x 11 cm in size. 2. Status post appendectomy. Drain in the right lower quadrant. Fluid in the right lower quadrant measuring 6.8 x 4 cm in size.    3. Atelectasis or infiltrate at both lung was aspirated to confirm appropriate needle position. A guidewire was then passed through the needle followed by insertion of progressively larger dilators up to an 8 [place] size. An 8 [place] multi-side-hole drainage catheter was  then passed over the wire and was coiled within the abscess pocket. The retaining suture was then locked in the standard fashion. Catheter was then hooked to a Des-Close drainage bag and the catheter was flushed several times with sterile saline. The catheter was stabilized to the skin with a Percu-Stay device. A sample of the fluid was sent to laboratory for culture and sensitivity testing. Status post successful abscess drainage procedure. . **This report has been created using voice recognition software. It may contain minor errors which are inherent in voice recognition technology. ** Final report electronically signed by Dr Teresa Marcus on 9/30/2023 3:23 PM    CT ABDOMEN PELVIS W IV CONTRAST Additional Contrast? None    Result Date: 9/30/2023  ** ADDENDUM #1 ** This report was discussed with Rodger Tijerina on Sep 30, 2023 01:40:00 EDT. This document has been electronically signed by: Brandan Romero on 09/30/2023 02:20 AM ** ORIGINAL REPORT ** CT abdomen and pelvis with contrast Comparison: 09/21/2023 Findings: There has been interval appendectomy. Large loculated fluid collections are now evident in the pelvis and within the right side of the abdomen. The largest collection is posterior to the uterus and may have engulfed the right ovary. The large collection has increased density inferiorly indicating blood products. This loculated collection has a narrow channel fluid on the left superiorly that appears to be contiguous with the left ovary. Multiple contiguous cyst-like structures form a complex arising from the inferior right ovary that is within the larger loculated fluid collection.  Separate anterior pelvic fluid collection has a J-shaped configuration measuring 10.5 cm

## 2023-10-11 PROBLEM — D75.839 THROMBOCYTOSIS: Status: ACTIVE | Noted: 2023-10-11

## 2023-10-11 PROBLEM — K65.1 ABSCESS OF ABDOMINAL CAVITY (HCC): Status: ACTIVE | Noted: 2023-10-11

## 2023-10-11 LAB
ANION GAP SERPL CALC-SCNC: 12 MEQ/L (ref 8–16)
BACTERIA SPEC AEROBE CULT: NORMAL
BASOPHILS ABSOLUTE: 0.1 THOU/MM3 (ref 0–0.1)
BASOPHILS NFR BLD AUTO: 0.5 %
BUN SERPL-MCNC: 8 MG/DL (ref 7–22)
CALCIUM SERPL-MCNC: 8.5 MG/DL (ref 8.5–10.5)
CHLORIDE SERPL-SCNC: 100 MEQ/L (ref 98–111)
CO2 SERPL-SCNC: 26 MEQ/L (ref 23–33)
CREAT SERPL-MCNC: 0.7 MG/DL (ref 0.4–1.2)
DEPRECATED RDW RBC AUTO: 50.4 FL (ref 35–45)
EOSINOPHIL NFR BLD AUTO: 3.3 %
EOSINOPHILS ABSOLUTE: 0.4 THOU/MM3 (ref 0–0.4)
ERYTHROCYTE [DISTWIDTH] IN BLOOD BY AUTOMATED COUNT: 18.5 % (ref 11.5–14.5)
GFR SERPL CREATININE-BSD FRML MDRD: > 60 ML/MIN/1.73M2
GLUCOSE SERPL-MCNC: 91 MG/DL (ref 70–108)
GRAM STN SPEC: NORMAL
HCT VFR BLD AUTO: 25.8 % (ref 37–47)
HGB BLD-MCNC: 7.8 GM/DL (ref 12–16)
IMM GRANULOCYTES # BLD AUTO: 0.09 THOU/MM3 (ref 0–0.07)
IMM GRANULOCYTES NFR BLD AUTO: 0.7 %
LYMPHOCYTES ABSOLUTE: 2 THOU/MM3 (ref 1–4.8)
LYMPHOCYTES NFR BLD AUTO: 16.2 %
MCH RBC QN AUTO: 23.6 PG (ref 26–33)
MCHC RBC AUTO-ENTMCNC: 30.2 GM/DL (ref 32.2–35.5)
MCV RBC AUTO: 77.9 FL (ref 81–99)
MONOCYTES ABSOLUTE: 0.9 THOU/MM3 (ref 0.4–1.3)
MONOCYTES NFR BLD AUTO: 7.4 %
NEUTROPHILS NFR BLD AUTO: 71.9 %
NRBC BLD AUTO-RTO: 0 /100 WBC
PLATELET # BLD AUTO: 1279 THOU/MM3 (ref 130–400)
PMV BLD AUTO: 9.2 FL (ref 9.4–12.4)
POTASSIUM SERPL-SCNC: 3.7 MEQ/L (ref 3.5–5.2)
RBC # BLD AUTO: 3.31 MILL/MM3 (ref 4.2–5.4)
SEGMENTED NEUTROPHILS ABSOLUTE COUNT: 8.8 THOU/MM3 (ref 1.8–7.7)
SODIUM SERPL-SCNC: 138 MEQ/L (ref 135–145)
WBC # BLD AUTO: 12.3 THOU/MM3 (ref 4.8–10.8)

## 2023-10-11 PROCEDURE — 87205 SMEAR GRAM STAIN: CPT

## 2023-10-11 PROCEDURE — 2580000003 HC RX 258

## 2023-10-11 PROCEDURE — 6360000002 HC RX W HCPCS: Performed by: INTERNAL MEDICINE

## 2023-10-11 PROCEDURE — 87040 BLOOD CULTURE FOR BACTERIA: CPT

## 2023-10-11 PROCEDURE — 87075 CULTR BACTERIA EXCEPT BLOOD: CPT

## 2023-10-11 PROCEDURE — 6370000000 HC RX 637 (ALT 250 FOR IP): Performed by: PHYSICIAN ASSISTANT

## 2023-10-11 PROCEDURE — 99233 SBSQ HOSP IP/OBS HIGH 50: CPT | Performed by: INTERNAL MEDICINE

## 2023-10-11 PROCEDURE — 93306 TTE W/DOPPLER COMPLETE: CPT

## 2023-10-11 PROCEDURE — 6360000002 HC RX W HCPCS

## 2023-10-11 PROCEDURE — 2580000003 HC RX 258: Performed by: INTERNAL MEDICINE

## 2023-10-11 PROCEDURE — 80048 BASIC METABOLIC PNL TOTAL CA: CPT

## 2023-10-11 PROCEDURE — 87077 CULTURE AEROBIC IDENTIFY: CPT

## 2023-10-11 PROCEDURE — 85025 COMPLETE CBC W/AUTO DIFF WBC: CPT

## 2023-10-11 PROCEDURE — 36415 COLL VENOUS BLD VENIPUNCTURE: CPT

## 2023-10-11 PROCEDURE — 2060000000 HC ICU INTERMEDIATE R&B

## 2023-10-11 PROCEDURE — 87070 CULTURE OTHR SPECIMN AEROBIC: CPT

## 2023-10-11 PROCEDURE — 6360000002 HC RX W HCPCS: Performed by: PHYSICIAN ASSISTANT

## 2023-10-11 RX ADMIN — MEROPENEM 1000 MG: 1 INJECTION, POWDER, FOR SOLUTION INTRAVENOUS at 06:01

## 2023-10-11 RX ADMIN — MEROPENEM 1000 MG: 1 INJECTION, POWDER, FOR SOLUTION INTRAVENOUS at 15:01

## 2023-10-11 RX ADMIN — SODIUM CHLORIDE: 9 INJECTION, SOLUTION INTRAVENOUS at 13:28

## 2023-10-11 RX ADMIN — MEROPENEM 1000 MG: 1 INJECTION, POWDER, FOR SOLUTION INTRAVENOUS at 21:56

## 2023-10-11 RX ADMIN — LINEZOLID 600 MG: 600 INJECTION, SOLUTION INTRAVENOUS at 13:30

## 2023-10-11 RX ADMIN — LINEZOLID 600 MG: 600 INJECTION, SOLUTION INTRAVENOUS at 02:29

## 2023-10-11 RX ADMIN — SODIUM CHLORIDE 25 ML: 9 INJECTION, SOLUTION INTRAVENOUS at 21:54

## 2023-10-11 RX ADMIN — TESTOSTERONE CYPIONATE 50 MG: 200 INJECTION, SOLUTION INTRAMUSCULAR at 08:37

## 2023-10-11 RX ADMIN — SODIUM CHLORIDE, POTASSIUM CHLORIDE, SODIUM LACTATE AND CALCIUM CHLORIDE: 600; 310; 30; 20 INJECTION, SOLUTION INTRAVENOUS at 00:09

## 2023-10-11 RX ADMIN — ENOXAPARIN SODIUM 40 MG: 100 INJECTION SUBCUTANEOUS at 08:33

## 2023-10-11 RX ADMIN — MORPHINE SULFATE 2 MG: 2 INJECTION, SOLUTION INTRAMUSCULAR; INTRAVENOUS at 00:10

## 2023-10-11 RX ADMIN — ACETAMINOPHEN 650 MG: 650 SUPPOSITORY RECTAL at 08:44

## 2023-10-11 RX ADMIN — SODIUM CHLORIDE, POTASSIUM CHLORIDE, SODIUM LACTATE AND CALCIUM CHLORIDE: 600; 310; 30; 20 INJECTION, SOLUTION INTRAVENOUS at 15:22

## 2023-10-11 ASSESSMENT — PAIN SCALES - GENERAL
PAINLEVEL_OUTOF10: 7
PAINLEVEL_OUTOF10: 5
PAINLEVEL_OUTOF10: 5
PAINLEVEL_OUTOF10: 4
PAINLEVEL_OUTOF10: 7
PAINLEVEL_OUTOF10: 5
PAINLEVEL_OUTOF10: 6

## 2023-10-11 ASSESSMENT — PAIN - FUNCTIONAL ASSESSMENT
PAIN_FUNCTIONAL_ASSESSMENT: ACTIVITIES ARE NOT PREVENTED

## 2023-10-11 ASSESSMENT — PAIN DESCRIPTION - LOCATION
LOCATION: ABDOMEN;BACK
LOCATION: ABDOMEN
LOCATION: ABDOMEN;BACK

## 2023-10-11 ASSESSMENT — PAIN DESCRIPTION - ONSET
ONSET: ON-GOING
ONSET: ON-GOING

## 2023-10-11 ASSESSMENT — PAIN DESCRIPTION - FREQUENCY
FREQUENCY: CONTINUOUS
FREQUENCY: CONTINUOUS

## 2023-10-11 ASSESSMENT — PAIN DESCRIPTION - DESCRIPTORS
DESCRIPTORS: ACHING;DISCOMFORT

## 2023-10-11 ASSESSMENT — PAIN DESCRIPTION - PAIN TYPE
TYPE: SURGICAL PAIN
TYPE: ACUTE PAIN;SURGICAL PAIN
TYPE: ACUTE PAIN;SURGICAL PAIN
TYPE: SURGICAL PAIN
TYPE: ACUTE PAIN;SURGICAL PAIN

## 2023-10-11 ASSESSMENT — PAIN DESCRIPTION - ORIENTATION
ORIENTATION: RIGHT
ORIENTATION: RIGHT;UPPER

## 2023-10-11 NOTE — PROGRESS NOTES
initially obtained to determine appropriate puncture site. The skin was marked, prepped, and draped in a sterile fashion. Following local anesthesia and utilizing aseptic technique, a needle was successfully passed into the fluid pocket of  interest.. A small amount of fluid was aspirated to confirm appropriate needle position. A guidewire was then passed through the needle followed by insertion of progressively larger dilators up to an 8 [place] size. An 8 [place] multi-side-hole drainage catheter was then passed over the wire and was coiled within the abscess pocket. The retaining suture was then locked in the standard fashion. Catheter was then hooked to a Des-Close drainage bag and the catheter was flushed several times with sterile saline. The catheter was stabilized to the skin with a 1-0 silk suture and split gauze dressing. . A sample of the bloody fluid was sent to laboratory for culture and sensitivity testing. Status post successful abscess/hematoma drainage procedure. . **This report has been created using voice recognition software. It may contain minor errors which are inherent in voice recognition technology. ** Final report electronically signed by Dr. Darleen Auguste on 10/3/2023 4:40 PM    US RENAL COMPLETE    Result Date: 10/2/2023  PROCEDURE: US RENAL COMPLETE CLINICAL INFORMATION: Acute kidney injury TECHNIQUE: Ultrasound of the kidneys and urinary bladder was performed. Grayscale and color images were obtained. COMPARISON: None FINDINGS: The right kidney measures 10.9 x 5.8 x 5.3 cm and left kidney measures 15.5 x 6.7 x 5.7 cm. Renal cortical thickness is normal bilaterally. A hypoechoic structure at the lower left kidney measures 1.4 cm. There is no hydronephrosis or renal calculi. Color Doppler demonstrates expected wave forms in the bilateral renal arteries. Arcuate resistive indices are normal bilaterally. The distended urinary bladder is unremarkable.  The patient did not void at the Face-to-face time with patient 15 minutes. PROCEDURE: Signed informed consent was obtained prior to performing this procedure. The patient was placed on the CT scanner and sedated, as indicated above. ALL CT SCANS AT THIS FACILITY use dose modulation, iterative reconstruction, and/or weight-based dosing when appropriate to reduce radiation dose to as low as reasonably achievable. CT images were initially obtained to determine appropriate puncture site. The skin was marked, prepped, and draped in a sterile fashion. Following local anesthesia and utilizing aseptic technique, a needle was successfully passed into the abscess pocket. A small amount of fluid was aspirated to confirm appropriate needle position. A guidewire was then passed through the needle followed by insertion of progressively larger dilators up to an 8 [place] size. An 8 [place] multi-side-hole drainage catheter was  then passed over the wire and was coiled within the abscess pocket. The retaining suture was then locked in the standard fashion. Catheter was then hooked to a Des-Close drainage bag and the catheter was flushed several times with sterile saline. The catheter was stabilized to the skin with a Percu-Stay device. A sample of the fluid was sent to laboratory for culture and sensitivity testing. Status post successful abscess drainage procedure. . **This report has been created using voice recognition software. It may contain minor errors which are inherent in voice recognition technology. ** Final report electronically signed by Dr Sandra Hebert on 9/30/2023 3:23 PM    CT ABDOMEN PELVIS W IV CONTRAST Additional Contrast? None    Result Date: 9/30/2023   ADDENDUM #1  This report was discussed with Aster Mcmahon on Sep 30, 2023 01:40:00 EDT. This document has been electronically signed by: Gely Culver on 09/30/2023 02:20 AM  ORIGINAL REPORT  CT abdomen and pelvis with contrast Comparison: 09/21/2023 Findings:  There has been interval

## 2023-10-11 NOTE — PROGRESS NOTES
Comprehensive Nutrition Assessment    Type and Reason for Visit:  Initial, Consult (Poor po intake 5 or more days)    Nutrition Recommendations/Plan:   Continue current diet as tolerated. Encouraged 6 small meals/day. Send Ensure High Protein TID. Send Magic Cup BID  Consider MVI as appropriate. Malnutrition Assessment:  Malnutrition Status: At risk for malnutrition (Comment) (10/11/23 1431)    Context:  Acute Illness     Findings of the 6 clinical characteristics of malnutrition:  Energy Intake:  50% or less of estimated energy requirements for 5 or more days (14 days per pt recall)  Weight Loss:  No significant weight loss     Body Fat Loss:  No significant body fat loss     Muscle Mass Loss:  No significant muscle mass loss    Fluid Accumulation:  No significant fluid accumulation     Strength:  Not Performed    Nutrition Assessment:     Pt. nutritionally compromised AEB intake less than 50% atleast 14 days per pt recall. At risk for further nutrition compromise r/t increased needs for wound healing s/p Appendectomy (9/21/23), Nausea Issues, Infection of Deep Incisional Surgical Site, Sepsis Secondary to Abscess of Abdominal Cavity, SIRS, Elevated CRP and underlying medical condition ( Hx:Asthma, Migraine, Transgender Female to Male ). Nutrition Related Findings:    Pt. Report/Treatments/Miscellaneous: Pt seen, poor appetite x 14 days, mentions gets nauseated whether he eats something or not. Alfredo Bautista (nutrition ambassador) mentions pt took a bite or two of yogurt for breakfast & a few sips of Ensure High Protein & bites of [place] for lunch. Pt reports \" a little nauseated\". He denies chewing/swallowing difficulty, likes Ensure high Protein & amenable to trying Magic Cup BID. GI Status: RN mentions pt had a BM today.   Pertinent Labs: (10/11) Hemoglobin 7.8  Pertinent Meds: Iron 325, Zyvox IV, Scopalamine     Wound Type: Surgical Incision (Appendectomy (9/21/23))       Current Nutrition Intake

## 2023-10-11 NOTE — PLAN OF CARE
Problem: Discharge Planning  Goal: Discharge to home or other facility with appropriate resources  Outcome: Progressing  Flowsheets (Taken 10/10/2023 2330)  Discharge to home or other facility with appropriate resources:   Identify barriers to discharge with patient and caregiver   Arrange for needed discharge resources and transportation as appropriate   Identify discharge learning needs (meds, wound care, etc)  Note: Feedback readiness for discharge. Promoting inclusion for discharge planning. Problem: Pain  Goal: Verbalizes/displays adequate comfort level or baseline comfort level  Outcome: Progressing  Flowsheets (Taken 10/10/2023 2330)  Verbalizes/displays adequate comfort level or baseline comfort level:   Encourage patient to monitor pain and request assistance   Assess pain using appropriate pain scale   Administer analgesics based on type and severity of pain and evaluate response   Implement non-pharmacological measures as appropriate and evaluate response   Consider cultural and social influences on pain and pain management  Note: Utilize correct pain assessment tool based on patient abilities. Use appropriate pain medications base on appropriate tools. Utilize non-pharmacologic pain reduction methods to supplement ordered pain medications. Educate patient on pain control. Educate patient on acceptable pain level with chronic pain. Talk to patient about non-pharmaceutical pain interventions. Encourage use of medication when needed. Promote rest and distractions from pain.       Problem: Safety - Adult  Goal: Free from fall injury  Outcome: Progressing  Flowsheets (Taken 10/10/2023 2330)  Free From Fall Injury:   Instruct family/caregiver on patient safety   Based on caregiver fall risk screen, instruct family/caregiver to ask for assistance with transferring infant if caregiver noted to have fall risk factors  Note: Bed in low position  Call light in reach  Bed wheel lock  Teaching to use call light for assistance  [place] Rounding  Non Skid Socks  Bed Alarm on   Use of front wheeled walker. Problem: Skin/Tissue Integrity - Adult  Goal: Skin integrity remains intact  Outcome: Progressing  Flowsheets (Taken 10/10/2023 2330)  Skin Integrity Remains Intact:   Monitor for areas of redness and/or skin breakdown   Assess vascular access sites hourly  Note: Maintain every 2 hour turn. Inspect skin areas  Address issues with open skin with proper wound care per policies and procedures Encouraging good fluid and diet intake. Encourage hygiene  Increase movement and encourage turns. Goal: Incisions, wounds, or drain sites healing without S/S of infection  Outcome: Progressing  Flowsheets (Taken 10/10/2023 0222 by Je Braga RN)  Incisions, Wounds, or Drain Sites Healing Without Sign and Symptoms of Infection: Implement wound care per orders     Problem: Gastrointestinal - Adult  Goal: Maintains or returns to baseline bowel function  Outcome: Progressing  Flowsheets (Taken 10/9/2023 0829 by Benjie Patrick RN)  Maintains or returns to baseline bowel function: Assess bowel function  Goal: Minimal or absence of nausea and vomiting  Outcome: Progressing  Flowsheets (Taken 10/10/2023 0222 by Je Braga RN)  Minimal or absence of nausea and vomiting:   Administer IV fluids as ordered to ensure adequate hydration   Administer ordered antiemetic medications as needed  Goal: Maintains adequate nutritional intake  Outcome: Progressing  Flowsheets (Taken 10/10/2023 2330)  Maintains adequate nutritional intake:   Monitor percentage of each meal consumed   Identify factors contributing to decreased intake, treat as appropriate   Assist with meals as needed   Monitor intake and output, weight and lab values   Obtain nutritional consult as needed  Note: Encourage intake during meal times. Encourage healthy choices.       Problem: Infection - Adult  Goal: Absence of infection at discharge  Outcome:

## 2023-10-11 NOTE — PLAN OF CARE
maintained within normal limits  10/11/2023 1022 by Kris Oneill RN  Outcome: Progressing  Flowsheets (Taken 10/11/2023 1022)  Electrolytes maintained within normal limits: Monitor labs and assess patient for signs and symptoms of electrolyte imbalances     Problem: Metabolic/Fluid and Electrolytes - Adult  Goal: Hemodynamic stability and optimal renal function maintained  10/11/2023 1022 by Kris Oneill RN  Outcome: Progressing  Flowsheets (Taken 10/11/2023 1022)  Hemodynamic stability and optimal renal function maintained:   Monitor labs and assess for signs and symptoms of volume excess or deficit   Monitor intake, output and patient weight     Problem: Skin/Tissue Integrity  Goal: Absence of new skin breakdown  Description: 1. Monitor for areas of redness and/or skin breakdown  2. Assess vascular access sites hourly  3. Every 4-6 hours minimum:  Change oxygen saturation probe site  4. Every 4-6 hours:  If on nasal continuous positive airway pressure, respiratory therapy assess nares and determine need for appliance change or resting period.   10/11/2023 1022 by Kris Oneill RN  Outcome: Progressing     Problem: Cardiovascular - Adult  Goal: Maintains optimal cardiac output and hemodynamic stability  10/11/2023 1022 by Kris Oneill RN  Outcome: Progressing  Flowsheets (Taken 10/11/2023 1022)  Maintains optimal cardiac output and hemodynamic stability:   Monitor blood pressure and heart rate   Assess for signs of decreased cardiac output     Problem: Cardiovascular - Adult  Goal: Absence of cardiac dysrhythmias or at baseline  10/11/2023 1022 by Kris Oneill RN  Outcome: Progressing  Flowsheets (Taken 10/11/2023 1022)  Absence of cardiac dysrhythmias or at baseline: Monitor cardiac rate and rhythm     Problem: Musculoskeletal - Adult  Goal: Return mobility to safest level of function  10/11/2023 1022 by Kris Oneill RN  Outcome: Progressing  Flowsheets (Taken 10/11/2023 1022)  Return Mobility to Safest Level of Function: Assess patient stability and activity tolerance for standing, transferring and ambulating with or without assistive devices     Problem: Musculoskeletal - Adult  Goal: Maintain proper alignment of affected body part  10/11/2023 1022 by Florencio Modi RN  Outcome: Progressing  Flowsheets (Taken 10/11/2023 1022)  Maintain proper alignment of affected body part: Support and protect limb and body alignment per provider's orders     Problem: Hematologic - Adult  Goal: Maintains hematologic stability  10/11/2023 1022 by Florencio Modi RN  Outcome: Progressing  Flowsheets (Taken 10/11/2023 1022)  Maintains hematologic stability: Assess for signs and symptoms of bleeding or hemorrhage    Care plan reviewed with patient and family. Patient and family verbalize understanding of the plan of care and contribute to goal setting.

## 2023-10-11 NOTE — CARE COORDINATION
10/11/23, 8:26 AM EDT    DISCHARGE  [address] [place] day: 9  Location: [facility]27/027-A Reason for admit: Elevated C-reactive protein (CRP) [R79.82]  Postoperative intra-abdominal abscess [T81.43XA]  Abscess of abdominal cavity (HCC) [K65.1]  SIRS (systemic inflammatory response syndrome) (HCC) [R65.10]  Leukocytosis, unspecified type [D72.829]  Infection of deep incisional surgical site after procedure, initial encounter [T81.42XA]  Postoperative abscess [T81.49XA]   Procedure:    9/30 CT guided drain placement  10/2 CTA abd: Large fluid collection in the rectovaginal pouch measuring 10.2 x 7.3 x 11 cm in size. Status post appendectomy. Drain in the right lower quadrant. Fluid in the right lower quadrant measuring 6.8 x 4 cm in size  10/3 CT guided drainage of pelvic hematoma/abscess     Barriers to Discharge: Platelets 8,339, WBC 12.3, Hgb 7.8, General Surgery, ID following. Pain and nausea control, Asa, Lovenox, ferrous sulfate, IV fluids, IV Zyvox, IV Merrem , Compazine, electrolyte replacement protocols. PCP: No primary care provider on file. Readmission Risk Score: 7.8%  Patient Goals/Plan/Treatment Preferences: Home with roommates.  [facility], appointment will need scheduled

## 2023-10-12 ENCOUNTER — APPOINTMENT (OUTPATIENT)
Dept: CT IMAGING | Age: 23
DRG: 862 | End: 2023-10-12

## 2023-10-12 LAB
BASOPHILS ABSOLUTE: 0.1 THOU/MM3 (ref 0–0.1)
BASOPHILS NFR BLD AUTO: 0.5 %
DEPRECATED RDW RBC AUTO: 50.6 FL (ref 35–45)
EOSINOPHIL NFR BLD AUTO: 2.4 %
EOSINOPHILS ABSOLUTE: 0.3 THOU/MM3 (ref 0–0.4)
ERYTHROCYTE [DISTWIDTH] IN BLOOD BY AUTOMATED COUNT: 18.6 % (ref 11.5–14.5)
HCT VFR BLD AUTO: 27.4 % (ref 37–47)
HGB BLD-MCNC: 8.3 GM/DL (ref 12–16)
IMM GRANULOCYTES # BLD AUTO: 0.08 THOU/MM3 (ref 0–0.07)
IMM GRANULOCYTES NFR BLD AUTO: 0.7 %
LYMPHOCYTES ABSOLUTE: 1.7 THOU/MM3 (ref 1–4.8)
LYMPHOCYTES NFR BLD AUTO: 14.1 %
MCH RBC QN AUTO: 23.4 PG (ref 26–33)
MCHC RBC AUTO-ENTMCNC: 30.3 GM/DL (ref 32.2–35.5)
MCV RBC AUTO: 77.2 FL (ref 81–99)
MONOCYTES ABSOLUTE: 0.9 THOU/MM3 (ref 0.4–1.3)
MONOCYTES NFR BLD AUTO: 7.1 %
NEUTROPHILS NFR BLD AUTO: 75.2 %
NRBC BLD AUTO-RTO: 0 /100 WBC
PLATELET # BLD AUTO: 1349 THOU/MM3 (ref 130–400)
PMV BLD AUTO: 9 FL (ref 9.4–12.4)
RBC # BLD AUTO: 3.55 MILL/MM3 (ref 4.2–5.4)
SEGMENTED NEUTROPHILS ABSOLUTE COUNT: 9 THOU/MM3 (ref 1.8–7.7)
WBC # BLD AUTO: 12 THOU/MM3 (ref 4.8–10.8)

## 2023-10-12 PROCEDURE — 99254 IP/OBS CNSLTJ NEW/EST MOD 60: CPT | Performed by: PHYSICIAN ASSISTANT

## 2023-10-12 PROCEDURE — 2060000000 HC ICU INTERMEDIATE R&B

## 2023-10-12 PROCEDURE — 74176 CT ABD & PELVIS W/O CONTRAST: CPT

## 2023-10-12 PROCEDURE — 36415 COLL VENOUS BLD VENIPUNCTURE: CPT

## 2023-10-12 PROCEDURE — 6360000002 HC RX W HCPCS

## 2023-10-12 PROCEDURE — 6360000002 HC RX W HCPCS: Performed by: INTERNAL MEDICINE

## 2023-10-12 PROCEDURE — 6370000000 HC RX 637 (ALT 250 FOR IP): Performed by: PHYSICIAN ASSISTANT

## 2023-10-12 PROCEDURE — 2580000003 HC RX 258

## 2023-10-12 PROCEDURE — 85025 COMPLETE CBC W/AUTO DIFF WBC: CPT

## 2023-10-12 PROCEDURE — 2580000003 HC RX 258: Performed by: INTERNAL MEDICINE

## 2023-10-12 PROCEDURE — 6370000000 HC RX 637 (ALT 250 FOR IP)

## 2023-10-12 PROCEDURE — 99233 SBSQ HOSP IP/OBS HIGH 50: CPT | Performed by: INTERNAL MEDICINE

## 2023-10-12 RX ADMIN — MEROPENEM 1000 MG: 1 INJECTION, POWDER, FOR SOLUTION INTRAVENOUS at 06:07

## 2023-10-12 RX ADMIN — MEROPENEM 1000 MG: 1 INJECTION, POWDER, FOR SOLUTION INTRAVENOUS at 21:42

## 2023-10-12 RX ADMIN — ENOXAPARIN SODIUM 40 MG: 100 INJECTION SUBCUTANEOUS at 08:20

## 2023-10-12 RX ADMIN — MEROPENEM 1000 MG: 1 INJECTION, POWDER, FOR SOLUTION INTRAVENOUS at 13:55

## 2023-10-12 RX ADMIN — SODIUM CHLORIDE, PRESERVATIVE FREE 10 ML: 5 INJECTION INTRAVENOUS at 08:14

## 2023-10-12 RX ADMIN — SODIUM CHLORIDE, POTASSIUM CHLORIDE, SODIUM LACTATE AND CALCIUM CHLORIDE: 600; 310; 30; 20 INJECTION, SOLUTION INTRAVENOUS at 13:44

## 2023-10-12 RX ADMIN — LINEZOLID 600 MG: 600 INJECTION, SOLUTION INTRAVENOUS at 02:04

## 2023-10-12 RX ADMIN — SODIUM CHLORIDE, PRESERVATIVE FREE 10 ML: 5 INJECTION INTRAVENOUS at 21:42

## 2023-10-12 RX ADMIN — LINEZOLID 600 MG: 600 INJECTION, SOLUTION INTRAVENOUS at 13:51

## 2023-10-12 ASSESSMENT — PAIN DESCRIPTION - LOCATION
LOCATION: ABDOMEN

## 2023-10-12 ASSESSMENT — PAIN DESCRIPTION - PAIN TYPE
TYPE: ACUTE PAIN;SURGICAL PAIN
TYPE: ACUTE PAIN;SURGICAL PAIN
TYPE: SURGICAL PAIN

## 2023-10-12 ASSESSMENT — PAIN DESCRIPTION - DESCRIPTORS
DESCRIPTORS: ACHING;DISCOMFORT
DESCRIPTORS: ACHING
DESCRIPTORS: ACHING;DISCOMFORT
DESCRIPTORS: ACHING

## 2023-10-12 ASSESSMENT — PAIN DESCRIPTION - ONSET
ONSET: ON-GOING
ONSET: ON-GOING

## 2023-10-12 ASSESSMENT — PAIN - FUNCTIONAL ASSESSMENT
PAIN_FUNCTIONAL_ASSESSMENT: ACTIVITIES ARE NOT PREVENTED
PAIN_FUNCTIONAL_ASSESSMENT: ACTIVITIES ARE NOT PREVENTED

## 2023-10-12 ASSESSMENT — PAIN DESCRIPTION - ORIENTATION
ORIENTATION: RIGHT

## 2023-10-12 ASSESSMENT — PAIN SCALES - GENERAL
PAINLEVEL_OUTOF10: 4
PAINLEVEL_OUTOF10: 4
PAINLEVEL_OUTOF10: 5
PAINLEVEL_OUTOF10: 4
PAINLEVEL_OUTOF10: 4
PAINLEVEL_OUTOF10: 5
PAINLEVEL_OUTOF10: 4

## 2023-10-12 ASSESSMENT — PAIN DESCRIPTION - FREQUENCY
FREQUENCY: CONTINUOUS
FREQUENCY: CONTINUOUS

## 2023-10-12 NOTE — CONSULTS
[facility]    Patient Alpesh Brown   MRN -  [id number]   [facility] # - [de-identified]   - 2000      Date of Admission -  2023 12:11 AM  Date of evaluation -  10/12/2023  [facility] Day - 10  Referred by- Tariq Rico MD Primary Care Physician - No primary care provider on file. Reason for Consult    Thrombocytosis   Active Hospital Problem List      Active Hospital Problems    Diagnosis Date Noted    Thrombocytosis [D75.839] 10/11/2023    Abscess of abdominal cavity ([address] ) [K65.1] 10/11/2023    Infection of deep incisional surgical site after procedure [T81.42XA] 10/10/2023    Postoperative abscess [T81.49XA] 10/02/2023    Postoperative intra-abdominal abscess [T81.43XA] 2023    SIRS (systemic inflammatory response syndrome) ([address] ) [R65.10] 2023    Leukocytosis [D72.829] 2023     HPI   Alpesh Brown is a 25 y.o. adult admitted for abdominal abscess. The patient presented to the ED on 23 with abdominal pain. PMH includes patient is transgender transitioning female to male on testosterone therapy. CT of abdomen/pelvis showed appendicolith, appendix is dilated 10 mm. Findings relate to acute appendicitis. Liver, and spleen were noted to be unremarkable. No significantly enlarged lymph nodes seen. The patient was admitted and underwent laparoscopic appendectomy per Dr. Gill Silence on 23. Patient was discharged on 23. On 23 returned to ED for generalized abdominal pain, fever, chills. CT of the abdomen pelvis on 2023 showed multifocal abscess/hematoma postoperatively. The patient was admitted under the hospitalist service. IR was consulted and the patient underwent CT-guided drain placement on 2023. ID consulted. Second drain placed per IR on 10/3/23. Patient has been on IV Vancomycin, IV merrem and IV Zyvox.    Hematology/Oncology consult was requested for thrombocytosis as platelet count 5,398,647 on 10/11/23. Patient is currently sitting up in bed. Affirms continued generalized weakness fatigue, but overall feeling better compared to earlier this admission. Denies chest pain, shortness of breath, palpitations, near syncope, urinary or bowel changes. Denies leg pain or edema. Affirms nausea which improved this am.   Patient affirms routine lab work completed at [facility] with testosterone therapy. CBC obtained on 8/22/22 showed platelet count 616,927. Hgb 10.5, hct 35.2, MCV 75.2, WBC count 5.62. Reports was recommended oral iron therapy but did not fill prescription. Last menses was 2 years ago which stopped after initiation of testosterone. Meds    Current Medications    scopolamine  1 patch TransDERmal Q72H    aspirin  81 mg Oral Daily    testosterone cypionate  50 mg Other Once per day on Wed    meropenem  1,000 mg IntraVENous Q8H    linezolid  600 mg IntraVENous Q12H    ferrous sulfate  325 mg Oral BID WC    sodium chloride flush  5-40 mL IntraVENous 2 times per day    enoxaparin  40 mg SubCUTAneous Daily     [DISCONTINUED] potassium chloride **OR** potassium chloride, [Held by provider] ibuprofen, acetaminophen **OR** acetaminophen, sodium chloride, sodium chloride flush, sodium chloride, [Held by provider] ondansetron **OR** [Held by provider] ondansetron, polyethylene glycol, morphine  IV Drips/Infusions   lactated ringers IV soln 50 mL/hr at 10/12/23 0142    sodium chloride      sodium chloride Stopped (10/12/23 0116)     Past Medical History         Diagnosis Date    Asthma     Migraine     Pneumonia 2010      Past Surgical History           Procedure Laterality Date    LAPAROSCOPIC APPENDECTOMY N/A 9/21/2023    Laparoscopic Appendectomy performed by Rito Diaz MD at [facility]  2019     Diet    ADULT DIET; Full Liquid  ADULT ORAL NUTRITION SUPPLEMENT; Breakfast, Lunch, Dinner;  Low Calorie/High Protein Oral Supplement  ADULT ORAL NUTRITION SUPPLEMENT;

## 2023-10-12 NOTE — FLOWSHEET NOTE
10/12/23 4420   Safe Environment   Safety Measures Call light within reach;Standard Safety Measures  (Virtual nurse safety round completed.)     Patient is awake and  alert and answers questions. No distress noted. Asked patient about potty, pain, positioning, personal belongings and personal needs. No needs at this time. Referred patient to page 13 of the handbook for fall prevention review. Educated on call light use. Call light in reach.

## 2023-10-12 NOTE — FLOWSHEET NOTE
10/12/23 1231   Safe Environment   Safety Measures Bed in low position;Call light within reach; Fall prevention (comment); Side rails X 2;Standard Safety Measures  (Virtual nurse safety round completed.)     Patient is awake and  alert and answers questions. No distress noted. Asked patient about potty, pain, positioning, personal belongings and personal needs. No needs at this time. Referred patient to page 13 of the handbook for fall prevention review. Educated on call light use. Call light in reach.

## 2023-10-12 NOTE — FLOWSHEET NOTE
10/11/23 2043   Safe Environment   Safety Measures Nurse at bedside;Standard Safety Measures  (virtual safety round completed)     Virtual nurse completed safety round with patient safe in bed stating no concerns at the moment; BRN in room. Thank you.

## 2023-10-12 NOTE — CARE COORDINATION
10/12/23, 8:52 AM EDT    DISCHARGE  [place] day: 10  Location: [facility]27/027 Reason for admit: Elevated C-reactive protein (CRP) [R79.82]  Postoperative intra-abdominal abscess [T81.43XA]  Abscess of abdominal cavity (HCC) [K65.1]  SIRS (systemic inflammatory response syndrome) (HCC) [R65.10]  Leukocytosis, unspecified type [D72.829]  Infection of deep incisional surgical site after procedure, initial encounter [T81.42XA]  Postoperative abscess [T81.49XA]   Procedure:    9/30 CT guided drain placement  10/2 CTA abd: Large fluid collection in the rectovaginal pouch measuring 10.2 x 7.3 x 11 cm in size. Status post appendectomy. Drain in the right lower quadrant. Fluid in the right lower quadrant measuring 6.8 x 4 cm in size  10/3 CT guided drainage of pelvic hematoma/abscess     Barriers to Discharge: General Surgery, ID following. Pain and nausea control, Asa, Lovenox, ferrous sulfate, IV fluids, IV Zyvox, IV Merrem , Compazine, electrolyte replacement protocols. PCP: No primary care provider on file. Readmission Risk Score: 8.1%  Patient Goals/Plan/Treatment Preferences:  Home with roommates.  [facility], appointment will need scheduled

## 2023-10-12 NOTE — PROGRESS NOTES
[facility]--HOSPITALIST GROUP    Hospitalist PROGRESS NOTE dictated by Alana Solo MD on 10/12/2023    Patient ID: Iban Aguilar is 25 y.o. and presently in room [facility]  : 2000 MRN: [id number]    Admit date: 2023  Primary Care Physician: No primary care provider on file. No care team member to display  Tel: None  IP CONSULT TO GENERAL SURGERY  IP CONSULT TO INFECTIOUS DISEASES  IP CONSULT TO PSYCHIATRY  IP CONSULT TO DIETITIAN  IP CONSULT TO [address] [place] [address]  Admitting Physician: VALDO Narvaez - CNP   Code Status:  Full Code    Iban Aguilar is a 25 y.o.  adult who presented with Post-op Problem    Admit date: 2023  [facility]  Day 10 of stay. 10/11/23: Has some abdominal discomfort. No chest pain, shortness of breath or cough. WBC 12.3, Hb 7.8, platelets 1,506 K.  ----    10/12/203: Some abdominal discomfort and tenderness. Patient denies chest pain, shortness of breath or cough. Patient on antibiotics per ID. Fluid collection in the midline lower abdomen measures 8.7 x 9.8 cm on abdominal/pelvic non contrast CT 10/12/23    WBC 12, Hb 8.3, platelet count 5907 K    Assessment:    Principal Problem:    Postoperative intra-abdominal abscess  Active Problems:    SIRS (systemic inflammatory response syndrome) (HCC)    Leukocytosis    Postoperative abscess    Infection of deep incisional surgical site after procedure    Thrombocytosis    Abscess of abdominal cavity (HCC)  Resolved Problems:    * No resolved hospital problems. *  1 unit of PRBCs on 10/2/23. fluid collection in the midline lower abdomen measures 8.7 x 9.8 cm on abdominal/pelvic non contrast CT 10/12/23      Plan:    Abx per ID. Consult hematology for marked thrombocytosis- reactive.   Continuous Infusions:    lactated ringers IV soln 50 mL/hr at 10/12/23 0142    sodium chloride      sodium chloride Stopped (10/12/23 0116)     scopolamine, 1 patch, Q72H  aspirin, 81 loculated fluid collection. Separate anterior pelvic fluid collection has a J-shaped configuration measuring 10.5 cm in long axis and 5.7 cm in short axis. Stranding is seen surrounding the fluid collection which is clearly loculated and may reflect an abscess collection. Surgical clips are noted at the medial aspect of the cecum extending slightly posteriorly from recent appendectomy. There is multiloculated fluid contiguous with the lateral aspect of the cecum extending through the right paracolic gutter. This collection measures 9.2 cm in long axis craniocaudally 3.0 cm transversely. This collection is contiguous with the more rounded collection in the right upper quadrant measuring 6.7 cm x 8.8 cm x 5.9 cm. There is increased density centrally in the right upper quadrant fluid collection indicating component of hematoma/blood products. Right upper quadrant fluid collection is also contiguous with fluid extending into the anterior abdominal wall musculature anterolaterally. This collection extends through the muscle layers and into subcutaneous tissues through might have been prior port tract. No focal pathology through the liver, spleen, pancreas, adrenals, and right kidney. There is a cyst in the lower pole of the left kidney measuring 1.2 cm in diameter. Density changes in the gallbladder suggest layering sludge. No small bowel obstruction or obvious focal colitis. Small bowel in the left lower quadrant appears to have some mild surrounding stranding which may be reactive to the acute changes elsewhere in the abdomen and pelvis. Impression: 1. Multifocal abscess/hematoma postoperatively, see above for discussion. 2. Gallbladder sludge. 3. Nonspecific inflammation of the small bowel in the left lower quadrant.  This document has been electronically signed by: Martha Kline MD on 09/30/2023 01:52 AM All CTs at this facility use dose modulation techniques and iterative reconstructions, and/or

## 2023-10-13 PROBLEM — D50.9 MICROCYTIC ANEMIA: Status: ACTIVE | Noted: 2023-10-13

## 2023-10-13 LAB
BASOPHILS ABSOLUTE: 0.1 THOU/MM3 (ref 0–0.1)
BASOPHILS NFR BLD AUTO: 0.8 %
DEPRECATED RDW RBC AUTO: 54.4 FL (ref 35–45)
EOSINOPHIL NFR BLD AUTO: 3 %
EOSINOPHILS ABSOLUTE: 0.3 THOU/MM3 (ref 0–0.4)
ERYTHROCYTE [DISTWIDTH] IN BLOOD BY AUTOMATED COUNT: 19 % (ref 11.5–14.5)
HCT VFR BLD AUTO: 26.2 % (ref 37–47)
HGB BLD-MCNC: 7.6 GM/DL (ref 12–16)
IMM GRANULOCYTES # BLD AUTO: 0.06 THOU/MM3 (ref 0–0.07)
IMM GRANULOCYTES NFR BLD AUTO: 0.6 %
LYMPHOCYTES ABSOLUTE: 2 THOU/MM3 (ref 1–4.8)
LYMPHOCYTES NFR BLD AUTO: 19.3 %
MCH RBC QN AUTO: 23.5 PG (ref 26–33)
MCHC RBC AUTO-ENTMCNC: 29 GM/DL (ref 32.2–35.5)
MCV RBC AUTO: 80.9 FL (ref 81–99)
MONOCYTES ABSOLUTE: 0.9 THOU/MM3 (ref 0.4–1.3)
MONOCYTES NFR BLD AUTO: 8.4 %
NEUTROPHILS NFR BLD AUTO: 67.9 %
NRBC BLD AUTO-RTO: 0 /100 WBC
PLATELET # BLD AUTO: 1066 THOU/MM3 (ref 130–400)
PMV BLD AUTO: 9 FL (ref 9.4–12.4)
RBC # BLD AUTO: 3.24 MILL/MM3 (ref 4.2–5.4)
SEGMENTED NEUTROPHILS ABSOLUTE COUNT: 7 THOU/MM3 (ref 1.8–7.7)
WBC # BLD AUTO: 10.3 THOU/MM3 (ref 4.8–10.8)

## 2023-10-13 PROCEDURE — 2580000003 HC RX 258: Performed by: INTERNAL MEDICINE

## 2023-10-13 PROCEDURE — 6370000000 HC RX 637 (ALT 250 FOR IP)

## 2023-10-13 PROCEDURE — 6360000002 HC RX W HCPCS

## 2023-10-13 PROCEDURE — 36415 COLL VENOUS BLD VENIPUNCTURE: CPT

## 2023-10-13 PROCEDURE — 85025 COMPLETE CBC W/AUTO DIFF WBC: CPT

## 2023-10-13 PROCEDURE — 6370000000 HC RX 637 (ALT 250 FOR IP): Performed by: INTERNAL MEDICINE

## 2023-10-13 PROCEDURE — 6370000000 HC RX 637 (ALT 250 FOR IP): Performed by: PHYSICIAN ASSISTANT

## 2023-10-13 PROCEDURE — 99233 SBSQ HOSP IP/OBS HIGH 50: CPT | Performed by: INTERNAL MEDICINE

## 2023-10-13 PROCEDURE — 2060000000 HC ICU INTERMEDIATE R&B

## 2023-10-13 PROCEDURE — 6360000002 HC RX W HCPCS: Performed by: INTERNAL MEDICINE

## 2023-10-13 RX ADMIN — MEROPENEM 1000 MG: 1 INJECTION, POWDER, FOR SOLUTION INTRAVENOUS at 06:25

## 2023-10-13 RX ADMIN — LINEZOLID 600 MG: 600 INJECTION, SOLUTION INTRAVENOUS at 14:54

## 2023-10-13 RX ADMIN — ENOXAPARIN SODIUM 40 MG: 100 INJECTION SUBCUTANEOUS at 09:58

## 2023-10-13 RX ADMIN — MEROPENEM 1000 MG: 1 INJECTION, POWDER, FOR SOLUTION INTRAVENOUS at 17:27

## 2023-10-13 RX ADMIN — ASPIRIN 81 MG CHEWABLE TABLET 81 MG: 81 TABLET CHEWABLE at 12:40

## 2023-10-13 RX ADMIN — FERROUS SULFATE TAB 325 MG (65 MG ELEMENTAL FE) 325 MG: 325 (65 FE) TAB at 18:44

## 2023-10-13 RX ADMIN — MEROPENEM 1000 MG: 1 INJECTION, POWDER, FOR SOLUTION INTRAVENOUS at 21:38

## 2023-10-13 RX ADMIN — LINEZOLID 600 MG: 600 INJECTION, SOLUTION INTRAVENOUS at 02:42

## 2023-10-13 RX ADMIN — SODIUM CHLORIDE, POTASSIUM CHLORIDE, SODIUM LACTATE AND CALCIUM CHLORIDE: 600; 310; 30; 20 INJECTION, SOLUTION INTRAVENOUS at 02:44

## 2023-10-13 ASSESSMENT — PAIN - FUNCTIONAL ASSESSMENT
PAIN_FUNCTIONAL_ASSESSMENT: ACTIVITIES ARE NOT PREVENTED

## 2023-10-13 ASSESSMENT — PAIN DESCRIPTION - ONSET
ONSET: ON-GOING

## 2023-10-13 ASSESSMENT — PAIN DESCRIPTION - PAIN TYPE
TYPE: ACUTE PAIN;SURGICAL PAIN
TYPE: SURGICAL PAIN
TYPE: SURGICAL PAIN

## 2023-10-13 ASSESSMENT — PAIN DESCRIPTION - FREQUENCY
FREQUENCY: CONTINUOUS
FREQUENCY: INTERMITTENT
FREQUENCY: INTERMITTENT

## 2023-10-13 ASSESSMENT — PAIN DESCRIPTION - DESCRIPTORS
DESCRIPTORS: ACHING;DISCOMFORT
DESCRIPTORS: ACHING
DESCRIPTORS: ACHING

## 2023-10-13 ASSESSMENT — PAIN DESCRIPTION - ORIENTATION
ORIENTATION: RIGHT
ORIENTATION: RIGHT

## 2023-10-13 ASSESSMENT — PAIN DESCRIPTION - LOCATION
LOCATION: ABDOMEN

## 2023-10-13 ASSESSMENT — PAIN SCALES - GENERAL
PAINLEVEL_OUTOF10: 5
PAINLEVEL_OUTOF10: 4
PAINLEVEL_OUTOF10: 4

## 2023-10-13 NOTE — PROGRESS NOTES
[facility]--HOSPITALIST GROUP    Hospitalist PROGRESS NOTE dictated by Tariq Rico MD on 10/13/2023    Patient ID: Alpesh Brown is 25 y.o. and presently in room [facility]  : 2000 MRN: [id number]    Admit date: 2023  Primary Care Physician: No primary care provider on file. No care team member to display  Tel: None  IP CONSULT TO GENERAL SURGERY  IP CONSULT TO INFECTIOUS DISEASES  IP CONSULT TO PSYCHIATRY  IP CONSULT TO DIETITIAN  IP CONSULT  [facility]  Admitting Physician: VALDO Gannon CNP   Code Status:  Full Code    Alpesh Brown is a 25 y.o.  adult who presented with Post-op Problem    Admit date: 2023  [facility]  Day 11 of stay. 10/11/23: Has some abdominal discomfort. No chest pain, shortness of breath or cough. WBC 12.3, Hb 7.8, platelets 2,690 K.  ----    10/12/203: Some abdominal discomfort and tenderness. Patient denies chest pain, shortness of breath or cough. Patient on antibiotics per ID. Fluid collection in the midline lower abdomen measures 8.7 x 9.8 cm on abdominal/pelvic non contrast CT 10/12/23    WBC 12, Hb 8.3, platelet count 0594 K  ----    10/13/2023: Patient has some abdominal discomfort and mild tenderness. ID plans to stop antibiotics on 10/18/2023. Denies chest pain, shortness of breath or cough. WBC normal at 10.3, microcytic anemia with a hemoglobin of 7.6, persistent thrombocytosis thought to be reactive. Patient is on iron supplementation. Assessment:    Principal Problem:    Postoperative intra-abdominal abscess  Active Problems:    SIRS (systemic inflammatory response syndrome) (HCC)    Leukocytosis    Postoperative abscess    Infection of deep incisional surgical site after procedure    Thrombocytosis    Abscess of abdominal cavity (HCC)    Microcytic anemia  Resolved Problems:    * No resolved hospital problems. *  1 unit of PRBCs on 10/2/23.    Low iron saturation of 5 on

## 2023-10-13 NOTE — PLAN OF CARE
Problem: Discharge Planning  Goal: Discharge to home or other facility with appropriate resources  Outcome: Progressing  Flowsheets (Taken 10/12/2023 2237)  Discharge to home or other facility with appropriate resources:   Identify barriers to discharge with patient and caregiver   Arrange for needed discharge resources and transportation as appropriate   Identify discharge learning needs (meds, wound care, etc)   Arrange for interpreters to assist at discharge as needed   Refer to discharge planning if patient needs post-hospital services based on physician order or complex needs related to functional status, cognitive ability or social support system     Problem: Pain  Goal: Verbalizes/displays adequate comfort level or baseline comfort level  Outcome: Progressing  Flowsheets (Taken 10/12/2023 2237)  Verbalizes/displays adequate comfort level or baseline comfort level:   Encourage patient to monitor pain and request assistance   Assess pain using appropriate pain scale   Administer analgesics based on type and severity of pain and evaluate response   Implement non-pharmacological measures as appropriate and evaluate response   Consider cultural and social influences on pain and pain management   Notify Licensed Independent Practitioner if interventions unsuccessful or patient reports new pain  Note: Utilize correct pain assessment tool based on patient abilities. Use appropriate pain medications base on appropriate tools. Utilize non-pharmacologic pain reduction methods to supplement ordered pain medications. Educate patient on pain control. Educate patient on acceptable pain level with chronic pain. Talk to patient about non-pharmaceutical pain interventions. Encourage use of medication when needed. Promote rest and distractions from pain.       Problem: Safety - Adult  Goal: Free from fall injury  Outcome: Progressing  Flowsheets (Taken 10/12/2023 2237)  Free From Fall Injury: Instruct family/caregiver on

## 2023-10-13 NOTE — PROGRESS NOTES
reasonably achievable. FINDINGS:   LIMITATIONS: The examination is limited without oral and IV contrast.     Lung bases:     Liver/gallbladder/bilary tree: Evaluation of the liver parenchyma is limited without IV contrast. The gallbladder is absent. No biliary ductal dilatation is observed. Trace perihepatic ascites is present. Pancreas: Unremarkable noncontrast appearance. Spleen : Unremarkable noncontrast appearance. Adrenal glands: Unremarkable noncontrast appearance. Kidneys/ ureters/ bladder: No renal calculus, hydronephrosis, or hydroureter is present. The urinary bladder wall is thickened     Gastrointestinal:  A fluid collection in the midline lower abdomen measures 8.7 x 9.8 cm (series 2, image 72). Mesenteric fat stranding and edema surrounding the collection is observed. Evaluation of the small and large bowel loops is limited without   oral contrast. The right-sided drainage catheter has been removed. The high density fluid within the pelvis posterior to the uterus has significantly decreased in volume. Retroperitoneum / lymph nodes: The aorta is not dilated. No lymphadenopathy is visualized. Pelvis: No adnexal lesions are observed. Musculoskeletal: The visualized skeletal structures appear intact. IMPRESSION:  1. Without oral and IV contrast, determining the presence of residual subhepatic fluid and intra-abdominal fluid collection/intra-abdominal abscess is very limited. A suspected fluid collection in the midline lower abdomen (series 2, images 73 and 74)   measures 8.7 x 9.8 cm. Surrounding mesenteric fat stranding and presacral inflammation is present. Trace free fluid in the right paracolic gutter has decreased in volume. No bowel obstruction is identified. 2. The retrouterine hypodensity fluid anterior to the rectosigmoid colon has significantly decreased in volume. A small amount of residual fluid is suspected. Chronic findings are discussed. **This report has been created using voice recognition software. It may contain minor errors which are inherent in voice recognition technology. **     Final report electronically signed by Dr Kim Knapp on 10/12/2023 12:01 PM           Exam Ended: 10/12/23 11:39              ASSESSMENT  1. Patient looks much better repeat CT yesterday did show another fluid collection however white count is normal clinically abdomen is soft we will hold off on IR drain and monitor patient to continue antibiotics IV till the 18th per Dr. Emmett Godinez' s note      PLAN  1.   As above monitor p.o. and      Jameel Alanis MD  Electronically signed 10/13/2023 at 7:24 PM No Repair - Repaired With Adjacent Surgical Defect Text (Leave Blank If You Do Not Want): After the excision the defect was repaired concurrently with another surgical defect which was in close approximation.

## 2023-10-14 PROCEDURE — 6360000002 HC RX W HCPCS

## 2023-10-14 PROCEDURE — 2580000003 HC RX 258

## 2023-10-14 PROCEDURE — 2060000000 HC ICU INTERMEDIATE R&B

## 2023-10-14 PROCEDURE — 2580000003 HC RX 258: Performed by: INTERNAL MEDICINE

## 2023-10-14 PROCEDURE — 6360000002 HC RX W HCPCS: Performed by: INTERNAL MEDICINE

## 2023-10-14 PROCEDURE — 99222 1ST HOSP IP/OBS MODERATE 55: CPT | Performed by: HOSPITALIST

## 2023-10-14 PROCEDURE — 6370000000 HC RX 637 (ALT 250 FOR IP)

## 2023-10-14 RX ADMIN — SODIUM CHLORIDE, PRESERVATIVE FREE 10 ML: 5 INJECTION INTRAVENOUS at 08:33

## 2023-10-14 RX ADMIN — ENOXAPARIN SODIUM 40 MG: 100 INJECTION SUBCUTANEOUS at 08:32

## 2023-10-14 RX ADMIN — MEROPENEM 1000 MG: 1 INJECTION, POWDER, FOR SOLUTION INTRAVENOUS at 14:12

## 2023-10-14 RX ADMIN — LINEZOLID 600 MG: 600 INJECTION, SOLUTION INTRAVENOUS at 02:21

## 2023-10-14 RX ADMIN — LINEZOLID 600 MG: 600 INJECTION, SOLUTION INTRAVENOUS at 14:11

## 2023-10-14 RX ADMIN — MEROPENEM 1000 MG: 1 INJECTION, POWDER, FOR SOLUTION INTRAVENOUS at 05:42

## 2023-10-14 RX ADMIN — MEROPENEM 1000 MG: 1 INJECTION, POWDER, FOR SOLUTION INTRAVENOUS at 20:52

## 2023-10-14 RX ADMIN — ASPIRIN 81 MG CHEWABLE TABLET 81 MG: 81 TABLET CHEWABLE at 14:07

## 2023-10-14 ASSESSMENT — PAIN DESCRIPTION - ORIENTATION: ORIENTATION: RIGHT

## 2023-10-14 ASSESSMENT — PAIN DESCRIPTION - LOCATION: LOCATION: ABDOMEN

## 2023-10-14 ASSESSMENT — PAIN DESCRIPTION - DESCRIPTORS: DESCRIPTORS: DULL

## 2023-10-14 ASSESSMENT — PAIN DESCRIPTION - FREQUENCY: FREQUENCY: CONTINUOUS

## 2023-10-14 ASSESSMENT — PAIN DESCRIPTION - PAIN TYPE: TYPE: SURGICAL PAIN

## 2023-10-14 ASSESSMENT — PAIN SCALES - GENERAL
PAINLEVEL_OUTOF10: 4

## 2023-10-14 ASSESSMENT — PAIN - FUNCTIONAL ASSESSMENT: PAIN_FUNCTIONAL_ASSESSMENT: ACTIVITIES ARE NOT PREVENTED

## 2023-10-14 ASSESSMENT — PAIN DESCRIPTION - ONSET: ONSET: ON-GOING

## 2023-10-14 NOTE — FLOWSHEET NOTE
10/14/23 1123   Safe Environment   Safety Measures Bed in low position;Call light within reach; Fall prevention (comment); Side rails X 2;Standard Safety Measures  (Virtual nurse safety round completed.)     Patient is awake and  alert and answers questions. No distress noted. VN ask patient about needs for potty, positioning, pain, personal belongings or personal needs. No needs at this time. Referred patient to page 13 of the handbook for fall prevention review. Educated on call light use. Call light in reach.

## 2023-10-14 NOTE — PLAN OF CARE
dressing/incision, wound bed, drain sites and surrounding tissue  Taken 10/13/2023 1402 by Jacquie Polk RN  Incisions, Wounds, or Drain Sites Healing Without Sign and Symptoms of Infection:   TWICE DAILY: Assess and document dressing/incision, wound bed, drain sites and surrounding tissue   TWICE DAILY: Assess and document skin integrity     Problem: Gastrointestinal - Adult  Goal: Maintains or returns to baseline bowel function  Outcome: Progressing  Flowsheets (Taken 10/14/2023 0036)  Maintains or returns to baseline bowel function: Assess bowel function     Problem: Gastrointestinal - Adult  Goal: Minimal or absence of nausea and vomiting  Flowsheets (Taken 10/14/2023 0036)  Minimal or absence of nausea and vomiting: Administer IV fluids as ordered to ensure adequate hydration     Problem: Gastrointestinal - Adult  Goal: Maintains adequate nutritional intake  Flowsheets (Taken 10/11/2023 1022 by Doris Singh RN)  Maintains adequate nutritional intake:   Monitor percentage of each meal consumed   Identify factors contributing to decreased intake, treat as appropriate   Monitor intake and output, weight and lab values   Obtain nutritional consult as needed     Problem: Infection - Adult  Goal: Absence of infection at discharge  Outcome: Progressing  Flowsheets (Taken 10/14/2023 0036)  Absence of infection at discharge:   Assess and monitor for signs and symptoms of infection   Monitor lab/diagnostic results     Problem: Infection - Adult  Goal: Absence of infection during hospitalization  Outcome: Progressing  Flowsheets (Taken 10/14/2023 0036)  Absence of infection during hospitalization:   Assess and monitor for signs and symptoms of infection   Monitor lab/diagnostic results     Problem: Metabolic/Fluid and Electrolytes - Adult  Goal: Electrolytes maintained within normal limits  Outcome: Progressing  Flowsheets (Taken 10/12/2023 2237 by Paris Beard RN)  Electrolytes maintained within normal limits:   Monitor labs and assess patient for signs and symptoms of electrolyte imbalances   Administer electrolyte replacement as ordered   Fluid restriction as ordered   Instruct patient on fluid and nutrition restrictions as appropriate   Monitor response to electrolyte replacements, including repeat lab results as appropriate     Problem: Metabolic/Fluid and Electrolytes - Adult  Goal: Hemodynamic stability and optimal renal function maintained  Outcome: Progressing  Flowsheets (Taken 10/14/2023 0036)  Hemodynamic stability and optimal renal function maintained:   Monitor labs and assess for signs and symptoms of volume excess or deficit   Monitor intake, output and patient weight     Problem: Cardiovascular - Adult  Goal: Maintains optimal cardiac output and hemodynamic stability  Outcome: Progressing  Flowsheets (Taken 10/14/2023 0036)  Maintains optimal cardiac output and hemodynamic stability:   Monitor blood pressure and heart rate   Monitor urine output and notify Licensed Independent Practitioner for values outside of normal range     Problem: Cardiovascular - Adult  Goal: Absence of cardiac dysrhythmias or at baseline  Flowsheets (Taken 10/14/2023 0036)  Absence of cardiac dysrhythmias or at baseline: Monitor cardiac rate and rhythm     Problem: Musculoskeletal - Adult  Goal: Return mobility to safest level of function  Outcome: Progressing  Flowsheets (Taken 10/14/2023 0036)  Return Mobility to Safest Level of Function:   Assess patient stability and activity tolerance for standing, transferring and ambulating with or without assistive devices   Assist with transfers and ambulation using safe patient handling equipment as needed     Problem: Musculoskeletal - Adult  Goal: Maintain proper alignment of affected body part  Flowsheets (Taken 10/14/2023 0036)  Maintain proper alignment of affected body part: Support and protect limb and body alignment per provider's orders     Problem: Hematologic -

## 2023-10-14 NOTE — FLOWSHEET NOTE
10/14/23 1347   Safe Environment   Safety Measures Standard Safety Measures;Call light within reach  (virtual safety round)     Pt up in chair. Alert and oriented. Reminded to utilize call light when needed.

## 2023-10-15 LAB
ALBUMIN SERPL BCG-MCNC: 3 G/DL (ref 3.5–5.1)
ALP SERPL-CCNC: 67 U/L (ref 38–126)
ALT SERPL W/O P-5'-P-CCNC: 20 U/L (ref 11–66)
ANION GAP SERPL CALC-SCNC: 14 MEQ/L (ref 8–16)
AST SERPL-CCNC: 19 U/L (ref 5–40)
BILIRUB SERPL-MCNC: 0.3 MG/DL (ref 0.3–1.2)
BUN SERPL-MCNC: 8 MG/DL (ref 7–22)
CALCIUM SERPL-MCNC: 8.9 MG/DL (ref 8.5–10.5)
CHLORIDE SERPL-SCNC: 100 MEQ/L (ref 98–111)
CO2 SERPL-SCNC: 25 MEQ/L (ref 23–33)
CREAT SERPL-MCNC: 0.7 MG/DL (ref 0.4–1.2)
DEPRECATED RDW RBC AUTO: 54.1 FL (ref 35–45)
ERYTHROCYTE [DISTWIDTH] IN BLOOD BY AUTOMATED COUNT: 19 % (ref 11.5–14.5)
GFR SERPL CREATININE-BSD FRML MDRD: > 60 ML/MIN/1.73M2
GLUCOSE SERPL-MCNC: 96 MG/DL (ref 70–108)
HCT VFR BLD AUTO: 26.9 % (ref 37–47)
HGB BLD-MCNC: 8 GM/DL (ref 12–16)
MCH RBC QN AUTO: 23.5 PG (ref 26–33)
MCHC RBC AUTO-ENTMCNC: 29.7 GM/DL (ref 32.2–35.5)
MCV RBC AUTO: 79.1 FL (ref 81–99)
PLATELET # BLD AUTO: 929 THOU/MM3 (ref 130–400)
PMV BLD AUTO: 8.9 FL (ref 9.4–12.4)
POTASSIUM SERPL-SCNC: 4.1 MEQ/L (ref 3.5–5.2)
PROT SERPL-MCNC: 6.5 G/DL (ref 6.1–8)
RBC # BLD AUTO: 3.4 MILL/MM3 (ref 4.2–5.4)
SODIUM SERPL-SCNC: 139 MEQ/L (ref 135–145)
WBC # BLD AUTO: 9.6 THOU/MM3 (ref 4.8–10.8)

## 2023-10-15 PROCEDURE — 2580000003 HC RX 258

## 2023-10-15 PROCEDURE — 85027 COMPLETE CBC AUTOMATED: CPT

## 2023-10-15 PROCEDURE — 6360000002 HC RX W HCPCS

## 2023-10-15 PROCEDURE — 2580000003 HC RX 258: Performed by: INTERNAL MEDICINE

## 2023-10-15 PROCEDURE — 2060000000 HC ICU INTERMEDIATE R&B

## 2023-10-15 PROCEDURE — 80053 COMPREHEN METABOLIC PANEL: CPT

## 2023-10-15 PROCEDURE — 6360000002 HC RX W HCPCS: Performed by: INTERNAL MEDICINE

## 2023-10-15 PROCEDURE — 99222 1ST HOSP IP/OBS MODERATE 55: CPT | Performed by: HOSPITALIST

## 2023-10-15 PROCEDURE — 36415 COLL VENOUS BLD VENIPUNCTURE: CPT

## 2023-10-15 RX ADMIN — LINEZOLID 600 MG: 600 INJECTION, SOLUTION INTRAVENOUS at 13:35

## 2023-10-15 RX ADMIN — MEROPENEM 1000 MG: 1 INJECTION, POWDER, FOR SOLUTION INTRAVENOUS at 05:33

## 2023-10-15 RX ADMIN — MEROPENEM 1000 MG: 1 INJECTION, POWDER, FOR SOLUTION INTRAVENOUS at 22:15

## 2023-10-15 RX ADMIN — LINEZOLID 600 MG: 600 INJECTION, SOLUTION INTRAVENOUS at 02:02

## 2023-10-15 RX ADMIN — ENOXAPARIN SODIUM 40 MG: 100 INJECTION SUBCUTANEOUS at 07:47

## 2023-10-15 RX ADMIN — MEROPENEM 1000 MG: 1 INJECTION, POWDER, FOR SOLUTION INTRAVENOUS at 13:36

## 2023-10-15 RX ADMIN — SODIUM CHLORIDE, PRESERVATIVE FREE 10 ML: 5 INJECTION INTRAVENOUS at 07:47

## 2023-10-15 ASSESSMENT — PAIN - FUNCTIONAL ASSESSMENT: PAIN_FUNCTIONAL_ASSESSMENT: ACTIVITIES ARE NOT PREVENTED

## 2023-10-15 ASSESSMENT — PAIN DESCRIPTION - ONSET: ONSET: ON-GOING

## 2023-10-15 ASSESSMENT — PAIN DESCRIPTION - ORIENTATION: ORIENTATION: RIGHT;MID

## 2023-10-15 ASSESSMENT — PAIN DESCRIPTION - LOCATION: LOCATION: ABDOMEN

## 2023-10-15 ASSESSMENT — PAIN DESCRIPTION - FREQUENCY: FREQUENCY: CONTINUOUS

## 2023-10-15 ASSESSMENT — PAIN SCALES - GENERAL: PAINLEVEL_OUTOF10: 4

## 2023-10-15 ASSESSMENT — PAIN DESCRIPTION - PAIN TYPE: TYPE: SURGICAL PAIN

## 2023-10-15 ASSESSMENT — PAIN DESCRIPTION - DESCRIPTORS: DESCRIPTORS: ACHING;DISCOMFORT

## 2023-10-15 NOTE — PLAN OF CARE
Problem: Discharge Planning  Goal: Discharge to home or other facility with appropriate resources  10/15/2023 0919 by Jin Gonzalez RN  Outcome: Progressing  Flowsheets (Taken 10/15/2023 0919)  Discharge to home or other facility with appropriate resources:   Identify barriers to discharge with patient and caregiver   Arrange for needed discharge resources and transportation as appropriate   Identify discharge learning needs (meds, wound care, etc)     Problem: Pain  Goal: Verbalizes/displays adequate comfort level or baseline comfort level  10/15/2023 0919 by Jin Gonzalez RN  Outcome: Progressing  Flowsheets (Taken 10/15/2023 0919)  Verbalizes/displays adequate comfort level or baseline comfort level:   Encourage patient to monitor pain and request assistance   Assess pain using appropriate pain scale   Administer analgesics based on type and severity of pain and evaluate response   Implement non-pharmacological measures as appropriate and evaluate response     Problem: Safety - Adult  Goal: Free from fall injury  10/15/2023 0919 by Jin Gonzalez RN  Outcome: Progressing  Flowsheets (Taken 10/15/2023 0919)  Free From Fall Injury:   Instruct family/caregiver on patient safety   Based on caregiver fall risk screen, instruct family/caregiver to ask for assistance with transferring infant if caregiver noted to have fall risk factors     Problem: Skin/Tissue Integrity - Adult  Goal: Skin integrity remains intact  10/15/2023 0919 by Jin Gonzalez RN  Outcome: Progressing  Flowsheets (Taken 10/15/2023 0919)  Skin Integrity Remains Intact:   Monitor for areas of redness and/or skin breakdown   Assess vascular access sites hourly     Problem: Skin/Tissue Integrity - Adult  Goal: Incisions, wounds, or drain sites healing without S/S of infection  10/15/2023 0919 by Jin Gonzalez RN  Outcome: Progressing  Flowsheets (Taken 10/15/2023 0919)  Incisions, Wounds, or Drain Sites Healing Without Sign and Symptoms of medications as ordered   Instruct and encourage patient and family to use good hand hygiene technique   Identify and instruct in appropriate isolation precautions for identified infection/condition     Problem: Infection - Adult  Goal: Absence of infection during hospitalization  10/15/2023 0919 by Arabella Blood RN  Outcome: Progressing  Flowsheets (Taken 10/15/2023 0919)  Absence of infection during hospitalization:   Assess and monitor for signs and symptoms of infection   Monitor lab/diagnostic results   Monitor all insertion sites i.e., indwelling lines, tubes and drains   Monitor endotracheal (as able) and nasal secretions for changes in amount and color   [facility] appropriate cooling/warming therapies per order   Administer medications as ordered   Instruct and encourage patient and family to use good hand hygiene technique   Identify and instruct in appropriate isolation precautions for identified infection/condition     Problem: Metabolic/Fluid and Electrolytes - Adult  Goal: Electrolytes maintained within normal limits  10/15/2023 0919 by Arabella Blood RN  Outcome: Progressing  Flowsheets (Taken 10/15/2023 0919)  Electrolytes maintained within normal limits:   Monitor labs and assess patient for signs and symptoms of electrolyte imbalances   Administer electrolyte replacement as ordered   Monitor response to electrolyte replacements, including repeat lab results as appropriate   Instruct patient on fluid and nutrition restrictions as appropriate     Problem: Metabolic/Fluid and Electrolytes - Adult  Goal: Hemodynamic stability and optimal renal function maintained  10/15/2023 0919 by Arabella Blood RN  Outcome: Progressing  Flowsheets (Taken 10/15/2023 0919)  Hemodynamic stability and optimal renal function maintained:   Monitor labs and assess for signs and symptoms of volume excess or deficit   Monitor intake, output and patient weight   Monitor urine specific gravity, serum osmolarity and serum sodium

## 2023-10-15 NOTE — PLAN OF CARE
Problem: Discharge Planning  Goal: Discharge to home or other facility with appropriate resources  Outcome: Progressing  Flowsheets (Taken 10/15/2023 0038)  Discharge to home or other facility with appropriate resources:   Identify barriers to discharge with patient and caregiver   Identify discharge learning needs (meds, wound care, etc)     Problem: Pain  Goal: Verbalizes/displays adequate comfort level or baseline comfort level  Outcome: Progressing  Flowsheets (Taken 10/15/2023 0038)  Verbalizes/displays adequate comfort level or baseline comfort level: Encourage patient to monitor pain and request assistance     Problem: Safety - Adult  Goal: Free from fall injury  Outcome: Progressing  Flowsheets (Taken 10/15/2023 0038)  Free From Fall Injury: Instruct family/caregiver on patient safety     Problem: Skin/Tissue Integrity - Adult  Goal: Skin integrity remains intact  Outcome: Progressing  Flowsheets (Taken 10/15/2023 0038)  Skin Integrity Remains Intact:   Monitor for areas of redness and/or skin breakdown   Assess vascular access sites hourly     Problem: Skin/Tissue Integrity - Adult  Goal: Incisions, wounds, or drain sites healing without S/S of infection  Outcome: Progressing  Flowsheets (Taken 10/15/2023 0038)  Incisions, Wounds, or Drain Sites Healing Without Sign and Symptoms of Infection:   ADMISSION and DAILY: Assess and document risk factors for pressure ulcer development   TWICE DAILY: Assess and document skin integrity   TWICE DAILY: Assess and document dressing/incision, wound bed, drain sites and surrounding tissue     Problem: Gastrointestinal - Adult  Goal: Maintains or returns to baseline bowel function  Outcome: Progressing  Flowsheets (Taken 10/15/2023 0038)  Maintains or returns to baseline bowel function:   Assess bowel function   Encourage oral fluids to ensure adequate hydration     Problem: Gastrointestinal - Adult  Goal: Minimal or absence of nausea and vomiting  Outcome: Progressing  Flowsheets (Taken 10/15/2023 0038)  Minimal or absence of nausea and vomiting: Administer IV fluids as ordered to ensure adequate hydration     Problem: Gastrointestinal - Adult  Goal: Maintains adequate nutritional intake  Outcome: Progressing  Flowsheets (Taken 10/15/2023 0038)  Maintains adequate nutritional intake: Monitor percentage of each meal consumed     Problem: Infection - Adult  Goal: Absence of infection at discharge  Outcome: Progressing  Flowsheets (Taken 10/15/2023 0038)  Absence of infection at discharge:   Assess and monitor for signs and symptoms of infection   Monitor lab/diagnostic results   Monitor all insertion sites i.e., indwelling lines, tubes and drains     Problem: Infection - Adult  Goal: Absence of infection during hospitalization  Outcome: Progressing  Flowsheets (Taken 10/15/2023 0038)  Absence of infection during hospitalization:   Assess and monitor for signs and symptoms of infection   Monitor lab/diagnostic results   Monitor all insertion sites i.e., indwelling lines, tubes and drains     Problem: Metabolic/Fluid and Electrolytes - Adult  Goal: Electrolytes maintained within normal limits  Outcome: Progressing  Flowsheets (Taken 10/15/2023 0038)  Electrolytes maintained within normal limits: Monitor labs and assess patient for signs and symptoms of electrolyte imbalances     Problem: Metabolic/Fluid and Electrolytes - Adult  Goal: Hemodynamic stability and optimal renal function maintained  Outcome: Progressing  Flowsheets (Taken 10/15/2023 0038)  Hemodynamic stability and optimal renal function maintained:   Monitor labs and assess for signs and symptoms of volume excess or deficit   Monitor intake, output and patient weight     Problem: Cardiovascular - Adult  Goal: Maintains optimal cardiac output and hemodynamic stability  Outcome: Progressing  Flowsheets (Taken 10/15/2023 0038)  Maintains optimal cardiac output and hemodynamic stability:   Monitor blood pressure

## 2023-10-15 NOTE — PROGRESS NOTES
contain minor errors which are inherent in voice recognition technology. ** Final report electronically signed by Dr. Prosper Butterfield on 10/3/2023 4:40 PM    CT ABSCESS DRAINAGE W CATH PLACEMENT S&I    Result Date: 10/3/2023  CT-GUIDED ABSCESS DRAINAGE PERFORMED BY: Janneth Avalos M.D. DRAINAGE SITE: Posterior pelvic fluid collection APPROACH: Transgluteal, left side. CATHETER: 8 [place] multipurpose drainage catheter. FLUID OBTAINED: 500 mL frankly bloody fluid. ESTIMATED BLOOD LOSS: Minimal SEDATION: Versed 4 mg; fentanyl 200 mcg, IV; the patient was sedated during this procedure,  and monitored with EKG and pulse ox monitoring devices by a registered nurse. Face-to-face time with patient 30 minutes. PROCEDURE: Signed informed consent was obtained prior to performing this procedure. The patient was placed on the CT scanner and sedated, as indicated above. ALL CT SCANS AT THIS FACILITY use dose modulation, iterative reconstruction, and/or weight-based dosing when appropriate to reduce radiation dose to as low as reasonably achievable. CT images were initially obtained to determine appropriate puncture site. The skin was marked, prepped, and draped in a sterile fashion. Following local anesthesia and utilizing aseptic technique, a needle was successfully passed into the fluid pocket of  interest.. A small amount of fluid was aspirated to confirm appropriate needle position. A guidewire was then passed through the needle followed by insertion of progressively larger dilators up to an 8 [place] size. An 8 [place] multi-side-hole drainage catheter was then passed over the wire and was coiled within the abscess pocket. The retaining suture was then locked in the standard fashion. Catheter was then hooked to a Des-Close drainage bag and the catheter was flushed several times with sterile saline. The catheter was stabilized to the skin with a 1-0 silk suture and split gauze dressing. . A sample of the bloody fluid was sent to performed with administration of intravenous contrast. Multiplanar reformats are provided. All CT scans at this facility use dose modulation, iterative reconstruction, and/or weight based dosing when appropriate to reduce the radiation dose to as low as reasonably achievable. CONTRAST: 80  cc of Isovue-370  intravenously FINDINGS: No significant lower thoracic findings. A 1.5 cm cyst is seen in the inferior pole left kidney. No hydronephrosis. No obstructing ureteral calculus. The liver, gallbladder, biliary tree, pancreas, adrenal glands, and spleen are unremarkable. No bowel obstruction or acute inflammatory bowel process. Appendicolith is seen. The appendix is dilated at 10 mm. Mild periappendiceal inflammation is noted. Findings relate to acute appendicitis. The abdominal aorta is not aneurysmal. No significantly enlarged lymph nodes are seen. The bladder is grossly unremarkable. The uterus and adnexa are unremarkable. Bones: No aggressive bony lesion is noted. 1.  Appendicolith is seen. The appendix is dilated at 10 mm. Mild periappendiceal inflammation is noted. Findings relate to acute appendicitis. **This report has been created using voice recognition software. It may contain minor errors which are inherent in voice recognition technology. ** Final report electronically signed by Dr Praneeth Spicer on 9/21/2023 12:40 PM      This note was dictated using M*Modal Fluency Direct so please excuse any grammatical or syntax errors as no guarantees can be provided that every mistake has been identified and corrected by editing.       Electronically signed by Alma Carrera MD on 10/15/2023 at 12:50 PM

## 2023-10-16 PROBLEM — E44.0 MODERATE MALNUTRITION (HCC): Status: ACTIVE | Noted: 2023-10-16

## 2023-10-16 LAB
ALBUMIN SERPL BCG-MCNC: 2.8 G/DL (ref 3.5–5.1)
ALP SERPL-CCNC: 63 U/L (ref 38–126)
ALT SERPL W/O P-5'-P-CCNC: 18 U/L (ref 11–66)
ANION GAP SERPL CALC-SCNC: 11 MEQ/L (ref 8–16)
AST SERPL-CCNC: 19 U/L (ref 5–40)
BACTERIA BLD AEROBE CULT: NORMAL
BACTERIA BLD AEROBE CULT: NORMAL
BILIRUB SERPL-MCNC: 0.3 MG/DL (ref 0.3–1.2)
BUN SERPL-MCNC: 9 MG/DL (ref 7–22)
CALCIUM SERPL-MCNC: 9.1 MG/DL (ref 8.5–10.5)
CHLORIDE SERPL-SCNC: 97 MEQ/L (ref 98–111)
CO2 SERPL-SCNC: 27 MEQ/L (ref 23–33)
CREAT SERPL-MCNC: 0.7 MG/DL (ref 0.4–1.2)
DEPRECATED RDW RBC AUTO: 53.6 FL (ref 35–45)
ERYTHROCYTE [DISTWIDTH] IN BLOOD BY AUTOMATED COUNT: 19.2 % (ref 11.5–14.5)
GFR SERPL CREATININE-BSD FRML MDRD: > 60 ML/MIN/1.73M2
GLUCOSE SERPL-MCNC: 91 MG/DL (ref 70–108)
HCT VFR BLD AUTO: 26.8 % (ref 37–47)
HGB BLD-MCNC: 8.1 GM/DL (ref 12–16)
MCH RBC QN AUTO: 23.6 PG (ref 26–33)
MCHC RBC AUTO-ENTMCNC: 30.2 GM/DL (ref 32.2–35.5)
MCV RBC AUTO: 78.1 FL (ref 81–99)
PLATELET # BLD AUTO: 855 THOU/MM3 (ref 130–400)
PMV BLD AUTO: 8.7 FL (ref 9.4–12.4)
POTASSIUM SERPL-SCNC: 3.9 MEQ/L (ref 3.5–5.2)
PROT SERPL-MCNC: 6.5 G/DL (ref 6.1–8)
RBC # BLD AUTO: 3.43 MILL/MM3 (ref 4.2–5.4)
SODIUM SERPL-SCNC: 135 MEQ/L (ref 135–145)
WBC # BLD AUTO: 8.5 THOU/MM3 (ref 4.8–10.8)

## 2023-10-16 PROCEDURE — 80053 COMPREHEN METABOLIC PANEL: CPT

## 2023-10-16 PROCEDURE — 6360000002 HC RX W HCPCS

## 2023-10-16 PROCEDURE — 93005 ELECTROCARDIOGRAM TRACING: CPT

## 2023-10-16 PROCEDURE — 2580000003 HC RX 258: Performed by: INTERNAL MEDICINE

## 2023-10-16 PROCEDURE — 99232 SBSQ HOSP IP/OBS MODERATE 35: CPT

## 2023-10-16 PROCEDURE — 2060000000 HC ICU INTERMEDIATE R&B

## 2023-10-16 PROCEDURE — 6360000002 HC RX W HCPCS: Performed by: INTERNAL MEDICINE

## 2023-10-16 PROCEDURE — 36415 COLL VENOUS BLD VENIPUNCTURE: CPT

## 2023-10-16 PROCEDURE — 85027 COMPLETE CBC AUTOMATED: CPT

## 2023-10-16 PROCEDURE — 93010 ELECTROCARDIOGRAM REPORT: CPT | Performed by: INTERNAL MEDICINE

## 2023-10-16 PROCEDURE — 6370000000 HC RX 637 (ALT 250 FOR IP): Performed by: INTERNAL MEDICINE

## 2023-10-16 RX ADMIN — MEROPENEM 1000 MG: 1 INJECTION, POWDER, FOR SOLUTION INTRAVENOUS at 22:10

## 2023-10-16 RX ADMIN — LINEZOLID 600 MG: 600 INJECTION, SOLUTION INTRAVENOUS at 13:11

## 2023-10-16 RX ADMIN — SODIUM CHLORIDE, POTASSIUM CHLORIDE, SODIUM LACTATE AND CALCIUM CHLORIDE: 600; 310; 30; 20 INJECTION, SOLUTION INTRAVENOUS at 08:47

## 2023-10-16 RX ADMIN — MEROPENEM 1000 MG: 1 INJECTION, POWDER, FOR SOLUTION INTRAVENOUS at 05:28

## 2023-10-16 RX ADMIN — LINEZOLID 600 MG: 600 INJECTION, SOLUTION INTRAVENOUS at 01:59

## 2023-10-16 RX ADMIN — MEROPENEM 1000 MG: 1 INJECTION, POWDER, FOR SOLUTION INTRAVENOUS at 14:57

## 2023-10-16 RX ADMIN — ENOXAPARIN SODIUM 40 MG: 100 INJECTION SUBCUTANEOUS at 08:46

## 2023-10-16 ASSESSMENT — PAIN DESCRIPTION - ONSET: ONSET: ON-GOING

## 2023-10-16 ASSESSMENT — PAIN DESCRIPTION - PAIN TYPE: TYPE: SURGICAL PAIN

## 2023-10-16 ASSESSMENT — PAIN DESCRIPTION - DESCRIPTORS: DESCRIPTORS: ACHING

## 2023-10-16 ASSESSMENT — PAIN - FUNCTIONAL ASSESSMENT: PAIN_FUNCTIONAL_ASSESSMENT: ACTIVITIES ARE NOT PREVENTED

## 2023-10-16 ASSESSMENT — PAIN DESCRIPTION - ORIENTATION: ORIENTATION: MID

## 2023-10-16 ASSESSMENT — PAIN DESCRIPTION - FREQUENCY: FREQUENCY: INTERMITTENT

## 2023-10-16 ASSESSMENT — PAIN SCALES - GENERAL: PAINLEVEL_OUTOF10: 3

## 2023-10-16 ASSESSMENT — PAIN DESCRIPTION - LOCATION: LOCATION: ABDOMEN

## 2023-10-16 NOTE — PROGRESS NOTES
Hospitalist Progress Note    Patient:  Sukumar Monroe    YOB: 2000  Unit/Bed:4K-27/027-A  Date of Admission: 9/30/2023    Expected Discharge: 10/18  Disposition: home    Assessment/Plan:    Abdominal abscess, s/p appendectomy 9/21: 2 drains placed initially, now been removed. Continue IV linezolid and Merrem therapy 10/18/2023 per Dr. Rosalva Garcia. Plan for repeat CT A/P 10/17 per Dr. Haydee Robb. Thrombocytosis: Improving, likely reactive 2/2 infection. Oncology evaluated patient on 10/12-suspect reaction to acute infection. Continue aspirin 81 mg and prophylactic Lovenox. Follow-up outpatient    Acute on chronic microcytic anemia: hemoglobin stable. No overt signs of bleeding. S/p 1 unit PRBCs 10/2/2023. Continue oral iron therapy per oncology. Monitor H&H with CBC in the am. Transfuse for Hgb less than 7 or hemodynamic instability if proper consent is obtained. Transgender male: Testosterone therapy weekly for pharmacological conversion    Leukocytosis: resolved      Chief Complaint: Abdominal pain    HPI / Hospital Course:   Per initial HPI 9/30 \"Patient is a 81-year-old transgender male, history of asthma and recent appendectomy on 9/21, presenting to ED with onset fevers, chills, abdominal pain in the past few days since surgery. Patient seen by Dr. Haydee Robb on 5/21, underwent laparoscopic appendectomy, discharged on 9/23. Patient was provided ibuprofen and Percocet for pain management. For past 2 days patient notes that he has been having subjective fevers and chills, unable to assess max temperature because he does not have a thermometer. Patient has been taking ibuprofen and Percocet as needed. Patient complains of minor headache, shortness of breath with deep inspiration, dysuria, and nausea.   But denies pain, diarrhea chest pain, weakness, numbness/tingling in extremities\"    Hospital course: Patient admitted on 9/30/2023 due to abdominal pain following an discussion. 2. Gallbladder sludge. 3. Nonspecific inflammation of the small bowel in the left lower quadrant. This document has been electronically signed by: Darlene Barrera MD on 09/30/2023 01:52 AM All CTs at this facility use dose modulation techniques and iterative reconstructions, and/or weight-based dosing when appropriate to reduce radiation to a low as reasonably achievable. DVT prophylaxis: Lovenox  Diet: ADULT DIET; Full Liquid  ADULT ORAL NUTRITION SUPPLEMENT; Breakfast, Lunch, Dinner;  Low Calorie/High Protein Oral Supplement  ADULT ORAL NUTRITION SUPPLEMENT; Lunch, Dinner; Frozen Oral Supplement  PT/OT: No  Tele: No  Code Status: Full Code      Electronically signed by Darren Berry PA-C on 10/16/2023 at 4:23 PM

## 2023-10-16 NOTE — PLAN OF CARE
Problem: Discharge Planning  Goal: Discharge to home or other facility with appropriate resources  10/16/2023 1039 by Brant Brothers RN  Outcome: Progressing  Flowsheets (Taken 10/16/2023 1039)  Discharge to home or other facility with appropriate resources:   Identify barriers to discharge with patient and caregiver   Identify discharge learning needs (meds, wound care, etc)   Refer to discharge planning if patient needs post-hospital services based on physician order or complex needs related to functional status, cognitive ability or social support system   Arrange for needed discharge resources and transportation as appropriate   Arrange for interpreters to assist at discharge as needed  Note: Patient plans to return home with roommates at discharge and no needs voiced at this time. Patient working with social work for discharge planning. Problem: Pain  Goal: Verbalizes/displays adequate comfort level or baseline comfort level  10/16/2023 1039 by Brant Brothers RN  Outcome: Progressing  Flowsheets (Taken 10/16/2023 1039)  Verbalizes/displays adequate comfort level or baseline comfort level:   Encourage patient to monitor pain and request assistance   Administer analgesics based on type and severity of pain and evaluate response   Consider cultural and social influences on pain and pain management   Assess pain using appropriate pain scale   Implement non-pharmacological measures as appropriate and evaluate response   Notify Licensed Independent Practitioner if interventions unsuccessful or patient reports new pain  Note: Pain Assessment: 0-10  Pain Level: 3   Patient's Stated Pain Goal: 0 - No pain   Is pain goal met at this time?   No     Non-Pharmaceutical Pain Intervention(s): Rest       Problem: Safety - Adult  Goal: Free from fall injury  10/16/2023 1039 by Brant Brothers RN  Outcome: Progressing  Flowsheets (Taken 10/16/2023 1039)  Free From Fall Injury:   Instruct family/caregiver on DAILY: Assess and document dressing/incision, wound bed, drain sites and surrounding tissue   Initiate pressure ulcer prevention bundle as indicated   Implement wound care per orders   TWICE DAILY: Assess and document skin integrity     Problem: Gastrointestinal - Adult  Goal: Maintains adequate nutritional intake  10/16/2023 1039 by Gilberto Henderson RN  Outcome: Progressing  Flowsheets (Taken 10/16/2023 1039)  Maintains adequate nutritional intake:   Monitor percentage of each meal consumed   Monitor intake and output, weight and lab values   Identify factors contributing to decreased intake, treat as appropriate   Obtain nutritional consult as needed   Assist with meals as needed  Note: Encouraging oral intake     Problem: Infection - Adult  Goal: Absence of infection at discharge  10/16/2023 1039 by Gilberto Henderson RN  Outcome: Progressing  Flowsheets (Taken 10/16/2023 1039)  Absence of infection at discharge:   Assess and monitor for signs and symptoms of infection   Monitor lab/diagnostic results   Monitor all insertion sites i.e., indwelling lines, tubes and drains   Identify and instruct in appropriate isolation precautions for identified infection/condition   Instruct and encourage patient and family to use good hand hygiene technique   Administer medications as ordered     Problem: Infection - Adult  Goal: Absence of infection during hospitalization  10/16/2023 1039 by Gilberto Henderson RN  Outcome: Progressing  Flowsheets (Taken 10/16/2023 1039)  Absence of infection during hospitalization:   Assess and monitor for signs and symptoms of infection   Monitor lab/diagnostic results   Monitor all insertion sites i.e., indwelling lines, tubes and drains   Identify and instruct in appropriate isolation precautions for identified infection/condition   Instruct and encourage patient and family to use good hand hygiene technique   Administer medications as ordered     Problem: Metabolic/Fluid and Electrolytes -

## 2023-10-16 NOTE — CARE COORDINATION
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

## 2023-10-16 NOTE — PLAN OF CARE
Problem: Discharge Planning  Goal: Discharge to home or other facility with appropriate resources  10/15/2023 2304 by Kurt Haile RN  Outcome: Progressing  Flowsheets  Taken 10/15/2023 2304  Discharge to home or other facility with appropriate resources:   Identify barriers to discharge with patient and caregiver   Arrange for needed discharge resources and transportation as appropriate   Identify discharge learning needs (meds, wound care, etc)   Refer to discharge planning if patient needs post-hospital services based on physician order or complex needs related to functional status, cognitive ability or social support system  Taken 10/15/2023 2030  Discharge to home or other facility with appropriate resources:   Identify barriers to discharge with patient and caregiver   Arrange for needed discharge resources and transportation as appropriate   Identify discharge learning needs (meds, wound care, etc)   Refer to discharge planning if patient needs post-hospital services based on physician order or complex needs related to functional status, cognitive ability or social support system     Problem: Pain  Goal: Verbalizes/displays adequate comfort level or baseline comfort level  10/15/2023 2304 by Kurt Haile RN  Outcome: Progressing  Flowsheets (Taken 10/15/2023 2304)  Verbalizes/displays adequate comfort level or baseline comfort level:   Encourage patient to monitor pain and request assistance   Assess pain using appropriate pain scale   Administer analgesics based on type and severity of pain and evaluate response   Implement non-pharmacological measures as appropriate and evaluate response     Problem: Safety - Adult  Goal: Free from fall injury  10/15/2023 2304 by Kurt Haile RN  Outcome: Progressing  Flowsheets (Taken 10/15/2023 2304)  Free From Fall Injury: Instruct family/caregiver on patient safety     Problem: Skin/Tissue Integrity - Adult  Goal: Skin integrity remains intact  10/15/2023 2304 by Progressing  Flowsheets  Taken 10/15/2023 2304  Hemodynamic stability and optimal renal function maintained:   Monitor labs and assess for signs and symptoms of volume excess or deficit   Monitor intake, output and patient weight   Monitor response to interventions for patient's volume status, including labs, urine output, blood pressure (other measures as available)  Taken 10/15/2023 2030  Hemodynamic stability and optimal renal function maintained:   Monitor labs and assess for signs and symptoms of volume excess or deficit   Monitor intake, output and patient weight   Monitor response to interventions for patient's volume status, including labs, urine output, blood pressure (other measures as available)     Problem: Skin/Tissue Integrity  Goal: Absence of new skin breakdown  Description: 1. Monitor for areas of redness and/or skin breakdown  2. Assess vascular access sites hourly  3. Every 4-6 hours minimum:  Change oxygen saturation probe site  4. Every 4-6 hours:  If on nasal continuous positive airway pressure, respiratory therapy assess nares and determine need for appliance change or resting period. 10/15/2023 2304 by Segun Morfin RN  Outcome: Progressing  Note: Encourage mobility      Care plan reviewed with patient. Patient verbalizes understanding of the plan of care and contributes to goal setting.

## 2023-10-16 NOTE — PROGRESS NOTES
Comprehensive Nutrition Assessment    Type and Reason for Visit:  Reassess    Nutrition Recommendations/Plan:   Continue current diet per surgery. Continue Ensure High Protein TID. Send Magic Cup BID. Consider MVI as appropriate. Po intake still remains poor/unable to meet needs LOS day  14- Physician to advise. Malnutrition Assessment:  Malnutrition Status: Moderate malnutrition (10/16/23 1618)    Context:  Acute Illness     Findings of the 6 clinical characteristics of malnutrition:  Energy Intake:  50% or less of estimated energy requirements for 5 or more days (14 days per pt recall)  Weight Loss:   (2.7% wt loss in 2 weeks per EMR)     Body Fat Loss:  No significant body fat loss     Muscle Mass Loss:  No significant muscle mass loss    Fluid Accumulation:  No significant fluid accumulation     Strength:  Not Performed    Nutrition Assessment:      Pt. Not improving from a nutritional standpoint AEB po intake remains 0-25% overall LOS day 14. At risk for further nutrition compromise r/t increased needs for wound healing s/p Appendectomy (9/21/23), Nausea Issues, Moderate Malnutrition,Infection of Deep Incisional Surgical Site, Sepsis Secondary to Abscess of Abdominal Cavity, SIRS, Elevated CRP and underlying medical condition ( Hx:Asthma, Migraine, Transgender Female to Male ). Nutrition Related Findings:    Pt. Report/Treatments/Miscellaneous: Pt seen, remains on full liquids & mentions nausea getting a little better but wants to remain on full liquids. Sips on [employer] & has not tried magic cup yet but says he will try them & wants them continued . Grandma brought potato soup & pt reports he ate 1/2 of it for lunch. He denies swallowing difficulty. Concerned po remains poor LOS day 14.   GI Status: BM x 2 (10/15)  Pertinent Labs: (10/16) Hemoglobin 8.1  Pertinent Meds: Zyvox, Ferrous Sulfate, Scopolamine     Wound Type: Surgical Incision (Appendectomy (9/21/23))       Current Nutrition Intake & Therapies:    Average Meal Intake: 0%, 1-25% (~14 days per pt recall)  Average Supplements Intake: 1-25%  ADULT DIET; Full Liquid  ADULT ORAL NUTRITION SUPPLEMENT; Breakfast, Lunch, Dinner; Low Calorie/High Protein Oral Supplement  ADULT ORAL NUTRITION SUPPLEMENT; Lunch, Dinner; Frozen Oral Supplement    Anthropometric Measures:  Height: 5' 9\" (175.3 cm)  Ideal Body Weight (IBW): 145 lbs (66 kg)    Admission Body Weight: 182 lb (82.6 kg) ((9/30) edema N/A)  Current Body Weight: 177 lb (80.3 kg) ((10/15) stand scale no edema),   IBW. Current BMI (kg/m2): 26.1  Usual Body Weight:  (per pt: 180-190#, per EMR: (9/21/23) 190# & 184# 15oz same day)                       BMI Categories: Overweight (BMI 25.0-29. 9)    Estimated Daily Nutrient Needs:  Energy Requirements Based On: Kcal/kg (80.3kgm (10/15))  Weight Used for Energy Requirements:  (87kgm (10/10))  Energy (kcal/day): 4070-4775 (20-25)  Weight Used for Protein Requirements: Ideal (66kgm IBW)  Protein (g/day): greater than  grams (1.3-2) for wound healing         Nutrition Diagnosis:   Moderate malnutrition, In context of acute illness or injury related to altered GI function as evidenced by Criteria as identified in malnutrition assessment, nausea    Nutrition Interventions:   Food and/or Nutrient Delivery: Continue Current Diet, Modify Oral Nutrition Supplement  Nutrition Education/Counseling: Education initiated (Encouraged 6 small meals/day & ONS intake as tolerated)  Coordination of Nutrition Care: Continue to monitor while inpatient       Goals:  Previous Goal Met: No Progress toward Goal(s)  Goals: PO intake 75% or greater, by next RD assessment       Nutrition Monitoring and Evaluation:      Food/Nutrient Intake Outcomes: Diet Advancement/Tolerance, Food and Nutrient Intake, Supplement Intake, Vitamin/Mineral Intake  Physical Signs/Symptoms Outcomes: Biochemical Data, GI Status, Nausea or Vomiting, Fluid Status or Edema,

## 2023-10-16 NOTE — FLOWSHEET NOTE
10/16/23 1155   Safe Environment   Safety Measures Bed in low position;Call light within reach; Fall prevention (comment); Side rails X 2;Standard Safety Measures  (Virtual nurse safety round completed.)     Patient is awake and  alert and answers questions. No distress noted. Patient asked about potty, pain, positioning, personal belongings & needs. No needs at this time. Referred patient to page 13 of the handbook for fall prevention review. Educated on call light use. Call light in reach.

## 2023-10-16 NOTE — FLOWSHEET NOTE
10/16/23 1036   Safe Environment   Safety Measures Fall prevention (comment); Standard Safety Measures;Call light within reach  (Virtual nurse safety round completed.)     Patient is awake and  alert and answers questions. No distress noted. Patient asked about potty, pain, positioning, personal belongings & needs. No needs at this time.

## 2023-10-16 NOTE — PROGRESS NOTES
8/18/23      Assessment and plan discussed with Attending, Dr Anna Pickens. Ammon Burgos MD,   10/16/2023 8:28 AM    Patient was seen and examined face-to-face by me  The chart, progress notes, labs  were reviewed. Case discussed with the nurse. Questions and concerns were addressed. I agree with the progress note.

## 2023-10-17 ENCOUNTER — APPOINTMENT (OUTPATIENT)
Dept: CT IMAGING | Age: 23
DRG: 862 | End: 2023-10-17

## 2023-10-17 LAB
ANION GAP SERPL CALC-SCNC: 13 MEQ/L (ref 8–16)
BUN SERPL-MCNC: 12 MG/DL (ref 7–22)
CALCIUM SERPL-MCNC: 9.1 MG/DL (ref 8.5–10.5)
CHLORIDE SERPL-SCNC: 102 MEQ/L (ref 98–111)
CO2 SERPL-SCNC: 25 MEQ/L (ref 23–33)
CREAT SERPL-MCNC: 0.7 MG/DL (ref 0.4–1.2)
DEPRECATED RDW RBC AUTO: 53.1 FL (ref 35–45)
ERYTHROCYTE [DISTWIDTH] IN BLOOD BY AUTOMATED COUNT: 19.3 % (ref 11.5–14.5)
GFR SERPL CREATININE-BSD FRML MDRD: > 60 ML/MIN/1.73M2
GLUCOSE SERPL-MCNC: 94 MG/DL (ref 70–108)
HCT VFR BLD AUTO: 28.6 % (ref 37–47)
HGB BLD-MCNC: 8.5 GM/DL (ref 12–16)
MCH RBC QN AUTO: 23.2 PG (ref 26–33)
MCHC RBC AUTO-ENTMCNC: 29.7 GM/DL (ref 32.2–35.5)
MCV RBC AUTO: 78.1 FL (ref 81–99)
PLATELET # BLD AUTO: 831 THOU/MM3 (ref 130–400)
PMV BLD AUTO: 9 FL (ref 9.4–12.4)
POTASSIUM SERPL-SCNC: 4.4 MEQ/L (ref 3.5–5.2)
RBC # BLD AUTO: 3.66 MILL/MM3 (ref 4.2–5.4)
SODIUM SERPL-SCNC: 140 MEQ/L (ref 135–145)
WBC # BLD AUTO: 9 THOU/MM3 (ref 4.8–10.8)

## 2023-10-17 PROCEDURE — 74177 CT ABD & PELVIS W/CONTRAST: CPT

## 2023-10-17 PROCEDURE — 99232 SBSQ HOSP IP/OBS MODERATE 35: CPT

## 2023-10-17 PROCEDURE — 99232 SBSQ HOSP IP/OBS MODERATE 35: CPT | Performed by: NURSE PRACTITIONER

## 2023-10-17 PROCEDURE — 6360000002 HC RX W HCPCS

## 2023-10-17 PROCEDURE — 85027 COMPLETE CBC AUTOMATED: CPT

## 2023-10-17 PROCEDURE — 6360000004 HC RX CONTRAST MEDICATION: Performed by: SURGERY

## 2023-10-17 PROCEDURE — 2580000003 HC RX 258

## 2023-10-17 PROCEDURE — 6360000002 HC RX W HCPCS: Performed by: INTERNAL MEDICINE

## 2023-10-17 PROCEDURE — 2580000003 HC RX 258: Performed by: INTERNAL MEDICINE

## 2023-10-17 PROCEDURE — 6370000000 HC RX 637 (ALT 250 FOR IP): Performed by: PHYSICIAN ASSISTANT

## 2023-10-17 PROCEDURE — 2060000000 HC ICU INTERMEDIATE R&B

## 2023-10-17 PROCEDURE — 36415 COLL VENOUS BLD VENIPUNCTURE: CPT

## 2023-10-17 PROCEDURE — 80048 BASIC METABOLIC PNL TOTAL CA: CPT

## 2023-10-17 RX ADMIN — FERROUS SULFATE TAB 325 MG (65 MG ELEMENTAL FE) 325 MG: 325 (65 FE) TAB at 17:35

## 2023-10-17 RX ADMIN — MEROPENEM 1000 MG: 1 INJECTION, POWDER, FOR SOLUTION INTRAVENOUS at 13:40

## 2023-10-17 RX ADMIN — SODIUM CHLORIDE, PRESERVATIVE FREE 10 ML: 5 INJECTION INTRAVENOUS at 19:29

## 2023-10-17 RX ADMIN — LINEZOLID 600 MG: 600 INJECTION, SOLUTION INTRAVENOUS at 02:12

## 2023-10-17 RX ADMIN — IOHEXOL 50 ML: 240 INJECTION, SOLUTION INTRATHECAL; INTRAVASCULAR; INTRAVENOUS; ORAL at 11:48

## 2023-10-17 RX ADMIN — MEROPENEM 1000 MG: 1 INJECTION, POWDER, FOR SOLUTION INTRAVENOUS at 21:57

## 2023-10-17 RX ADMIN — ENOXAPARIN SODIUM 40 MG: 100 INJECTION SUBCUTANEOUS at 08:17

## 2023-10-17 RX ADMIN — IOPAMIDOL 80 ML: 755 INJECTION, SOLUTION INTRAVENOUS at 11:48

## 2023-10-17 RX ADMIN — MEROPENEM 1000 MG: 1 INJECTION, POWDER, FOR SOLUTION INTRAVENOUS at 05:46

## 2023-10-17 RX ADMIN — LINEZOLID 600 MG: 600 INJECTION, SOLUTION INTRAVENOUS at 12:16

## 2023-10-17 RX ADMIN — SODIUM CHLORIDE, PRESERVATIVE FREE 10 ML: 5 INJECTION INTRAVENOUS at 08:17

## 2023-10-17 ASSESSMENT — PAIN DESCRIPTION - ORIENTATION
ORIENTATION: MID
ORIENTATION: MID;LOWER
ORIENTATION: MID
ORIENTATION: MID;LOWER

## 2023-10-17 ASSESSMENT — PAIN DESCRIPTION - LOCATION
LOCATION: ABDOMEN

## 2023-10-17 ASSESSMENT — PAIN DESCRIPTION - FREQUENCY
FREQUENCY: INTERMITTENT

## 2023-10-17 ASSESSMENT — PAIN - FUNCTIONAL ASSESSMENT
PAIN_FUNCTIONAL_ASSESSMENT: ACTIVITIES ARE NOT PREVENTED

## 2023-10-17 ASSESSMENT — PAIN SCALES - GENERAL
PAINLEVEL_OUTOF10: 1
PAINLEVEL_OUTOF10: 3
PAINLEVEL_OUTOF10: 1
PAINLEVEL_OUTOF10: 3

## 2023-10-17 ASSESSMENT — PAIN DESCRIPTION - PAIN TYPE
TYPE: SURGICAL PAIN

## 2023-10-17 ASSESSMENT — PAIN DESCRIPTION - DESCRIPTORS
DESCRIPTORS: DISCOMFORT
DESCRIPTORS: ACHING;DULL
DESCRIPTORS: DISCOMFORT
DESCRIPTORS: DULL;ACHING

## 2023-10-17 ASSESSMENT — PAIN DESCRIPTION - ONSET
ONSET: ON-GOING
ONSET: GRADUAL
ONSET: GRADUAL
ONSET: ON-GOING
ONSET: ON-GOING

## 2023-10-17 NOTE — PROGRESS NOTES
Hospitalist Progress Note    Patient:  Rafia Cadena    YOB: 2000  Unit/Bed:4K-27/027-A  Date of Admission: 9/30/2023    Expected Discharge: 10/18  Disposition: home    Assessment/Plan:    Abdominal abscess, s/p appendectomy 9/21: 2 drains placed initially, now been removed. Continue IV linezolid and Merrem therapy 10/18/2023 per Dr. Nivia Rendon. Plan for repeat CT A/P today per Dr. Cherelle Forrest. Thrombocytosis: Improving, likely reactive 2/2 infection. Oncology evaluated patient on 10/12-suspect reaction to acute infection. Continue aspirin 81 mg and prophylactic Lovenox. Follow-up outpatient    Acute on chronic microcytic anemia: hemoglobin stable. No overt signs of bleeding. S/p 1 unit PRBCs 10/2/2023. Continue oral iron therapy per oncology. Monitor H&H with CBC in the am. Transfuse for Hgb less than 7 or hemodynamic instability if proper consent is obtained. Transgender male: Testosterone therapy weekly for pharmacological conversion    Leukocytosis: resolved      Chief Complaint: Abdominal pain    HPI / Hospital Course:   Per initial HPI 9/30 \"Patient is a 50-year-old transgender male, history of asthma and recent appendectomy on 9/21, presenting to ED with onset fevers, chills, abdominal pain in the past few days since surgery. Patient seen by Dr. Cherelle Forrest on 5/21, underwent laparoscopic appendectomy, discharged on 9/23. Patient was provided ibuprofen and Percocet for pain management. For past 2 days patient notes that he has been having subjective fevers and chills, unable to assess max temperature because he does not have a thermometer. Patient has been taking ibuprofen and Percocet as needed. Patient complains of minor headache, shortness of breath with deep inspiration, dysuria, and nausea.   But denies pain, diarrhea chest pain, weakness, numbness/tingling in extremities\"    Hospital course: Patient admitted on 9/30/2023 due to abdominal pain following an colitis. Small bowel in the left lower quadrant appears to have some mild surrounding stranding which may be reactive to the acute changes elsewhere in the abdomen and pelvis. Impression: 1. Multifocal abscess/hematoma postoperatively, see above for discussion. 2. Gallbladder sludge. 3. Nonspecific inflammation of the small bowel in the left lower quadrant. This document has been electronically signed by: Stephon Segura MD on 09/30/2023 01:52 AM All CTs at this facility use dose modulation techniques and iterative reconstructions, and/or weight-based dosing when appropriate to reduce radiation to a low as reasonably achievable. DVT prophylaxis: Lovenox  Diet: ADULT DIET; Full Liquid  ADULT ORAL NUTRITION SUPPLEMENT; Breakfast, Lunch, Dinner;  Low Calorie/High Protein Oral Supplement  ADULT ORAL NUTRITION SUPPLEMENT; Lunch, Dinner; Frozen Oral Supplement  PT/OT: No  Tele: No  Code Status: Full Code      Electronically signed by Anna Marie Acosta PA-C on 10/17/2023 at 9:23 AM

## 2023-10-17 NOTE — PROGRESS NOTES
[facility]    Patient Sukumar Monroe   MRN -  [id number]   [facility] # - [de-identified]   - 2000      Date of Admission -  2023 12:11 AM  Date of evaluation -  10/17/2023  Room - [facility] Day - 13  Consulting - Donavon Jhaveri [place] Primary Care Physician - No primary care provider on file. Reason for Consult    thrombocytosis  Active Hospital Problem List      Active Hospital Problems    Diagnosis Date Noted    Moderate malnutrition ([address] ) [E44.0] 10/16/2023    Microcytic anemia [D50.9] 10/13/2023    Thrombocytosis [D75.839] 10/11/2023    Abscess of abdominal cavity ([address] ) [K65.1] 10/11/2023    Infection of deep incisional surgical site after procedure [T81.42XA] 10/10/2023    Postoperative abscess [T81.49XA] 10/02/2023    Postoperative intra-abdominal abscess [T81.43XA] 2023    SIRS (systemic inflammatory response syndrome) ([address] ) [R65.10] 2023    Leukocytosis [D72.829] 2023     HPI/Subjective   Sukumar Monroe is a 25 y.o. adult admitted fabdominal abscess. The patient presented to the ED on 23 with abdominal pain. PMH includes patient is transgender transitioning female to male on testosterone therapy. CT of abdomen/pelvis showed appendicolith, appendix is dilated 10 mm. Findings relate to acute appendicitis. Liver, and spleen were noted to be unremarkable. No significantly enlarged lymph nodes seen. The patient was admitted and underwent laparoscopic appendectomy per Dr. Haydee Robb on 23. Patient was discharged on 23. On 23 returned to ED for generalized abdominal pain, fever, chills. CT of the abdomen pelvis on 2023 showed multifocal abscess/hematoma postoperatively. The patient was admitted under the hospitalist service. IR was consulted and the patient underwent CT-guided drain placement on 2023. ID consulted. Second drain placed per IR on 10/3/23.  Patient has been on IV Vancomycin, IV merrem and IV motion. Trachea midline. Respiratory:  Normal respiratory effort. Clear to auscultation all lung fields. Cardiovascular: RRR, S1/S2  Abdomen: Soft, non-tender, non-distended with active BS  Musculoskeletal: No clubbing, cyanosis or edema bilaterally. Resting in chair  Skin: Skin color, texture, turgor normal.  No rashes or lesions. Neurologic:  Neurovascularly intact without any focal sensory/motor deficits. Cranial nerves: II-XII intact, grossly non-focal.  Psychiatric: Alert and oriented x 3, thought content appropriate, normal insight  Capillary Refill: < 3 seconds   Peripheral Pulses: +2 palpable       Labs   CBC  Recent Labs     10/15/23  0711 10/16/23  0413 10/17/23  0759   WBC 9.6 8.5 9.0   RBC 3.40* 3.43* 3.66*   HGB 8.0* 8.1* 8.5*   HCT 26.9* 26.8* 28.6*   MCV 79.1* 78.1* 78.1*   MCH 23.5* 23.6* 23.2*   MCHC 29.7* 30.2* 29.7*   * 855* 831*   MPV 8.9* 8.7* 9.0*      BMP  Recent Labs     10/15/23  0711 10/16/23  0413 10/17/23  0759    135 140   K 4.1 3.9 4.4    97* 102   CO2 25 27 25   BUN 8 9 12   CREATININE 0.7 0.7 0.7   GLUCOSE 96 91 94   CALCIUM 8.9 9.1 9.1     LFT  Recent Labs     10/15/23  0711 10/16/23  0413   AST 19 19   ALT 20 18   BILITOT 0.3 0.3   ALKPHOS 67 63     INR  No results for input(s): \"INR\", \"PROTIME\" in the last 72 hours. PTT  No results for input(s): \"APTT\" in the last 72 hours. Radiology        CT ABDOMEN PELVIS W IV CONTRAST Additional Contrast? Oral    Result Date: 10/17/2023  PROCEDURE: CT ABDOMEN PELVIS W IV CONTRAST CLINICAL INFORMATION: dipak abscess COMPARISON: CT scan of the abdomen and pelvis dated 10/12/2023. TECHNIQUE: Axial 5 mm CT images were obtained through the abdomen and pelvis after the administration of intravenous contrast. Coronal and sagittal reconstructions were obtained.  All CT scans at this facility use dose modulation, iterative reconstruction, and/or weight-based dosing when appropriate to reduce radiation dose to as low as

## 2023-10-17 NOTE — FLOWSHEET NOTE
10/17/23 1608   Safe Environment   Safety Measures Bed in low position;Call light within reach; Side rails X 2  (Virtual Nurse Safety Round Complete)     Call placed to patients room, patient responds to audio, permitted video. Patient alert and oriented. Patient denied any voiced conscerns/complaints at this time. Patient educated on utilizig call light. Call light within reach, no signs or symtpoms of distress noted.

## 2023-10-17 NOTE — CARE COORDINATION
10/17/23, 8:41 AM EDT    DISCHARGE  [address] [place] day: 15  Location: [address] [place] [address] Reason for admit: Elevated C-reactive protein (CRP) [R79.82]  Postoperative intra-abdominal abscess [T81.43XA]  Abscess of abdominal cavity (HCC) [K65.1]  SIRS (systemic inflammatory response syndrome) (HCC) [R65.10]  Leukocytosis, unspecified type [D72.829]  Infection of deep incisional surgical site after procedure, initial encounter [T81.42XA]  Postoperative abscess [T81.49XA]   Procedure:    9/30 CT guided drain placement  10/2 CTA abd: Large fluid collection in the rectovaginal pouch measuring 10.2 x 7.3 x 11 cm in size. Status post appendectomy. Drain in the right lower quadrant. Fluid in the right lower quadrant measuring 6.8 x 4 cm in size  10/3 CT guided drainage of pelvic hematoma/abscess  10/12 CT Abdomen Pelvis WO Contrast 1. Without oral and IV contrast, determining the presence of residual subhepatic fluid and intra-abdominal fluid collection/intra-abdominal abscess is very limited. A suspected fluid collection in the midline lower abdomen (series 2, images 73 and 74)   measures 8.7 x 9.8 cm. Surrounding mesenteric fat stranding and presacral inflammation is present. Trace free fluid in the right paracolic gutter has decreased in volume. No bowel obstruction is identified. 2. The retrouterine hypodensity fluid anterior to the rectosigmoid colon has significantly decreased in volume. A small amount of residual fluid is suspected. Chronic findings are discussed. Barriers to Discharge: Hgb 8.5, blood culture with no growth after 5 days. General Surgery, ID following. Asa, Lovenox, ferrous sulfate, IV Zyvox, IV Merrem, pain and nausea control. PCP: No primary care provider on file. Readmission Risk Score: 7.5%  Patient Goals/Plan/Treatment Preferences:  Home with roommates.  [facility], appointment will need scheduled

## 2023-10-17 NOTE — PROGRESS NOTES
0296 received shift report with primary nurse Whit Cabrera. Patient was awake and requested to use the bathroom. Patient returned to bed with no concerns voiced and call light in reach      Head to [facility] Student Nurse    Skin  General skin appearance / Description: clean, dry, intact. Small red sore on buttocks resembling a pimple. Incisions / Wounds: wound on abdomen. Intact. Neuro/Head  LOC: A+O x 4  Speech: appropriate and clear  Eyes PERRLA  pupils equal round and reactive to light 3-2 mm  Symmetry of face / tongue: symmetrical   JVD of neck: no JVD    Chest  Heart sounds / Apical Pulse: heart sounds are moderate with a rate of 88 bpm.   Dyspnea: no dyspnea   Lung sounds: lung sounds are clear, with regular rhythm, depth and rate of 20 breaths per minute. Symmetrical chest movements. Cough: no cough    Abdomen/ Pelvis  Bowel sounds: active x4  Passing flatus: no flatus  Palpation / Inspection: abdomen round, soft, tender in the LLQ AND RLQ. Without palpable mass. Last BM: 10/15/2023  Stool assessment: n/a  Any GI issues: n/a  Urinary issues: no  Continence: yes   Urine color / turbidity: clear, yellow straw color    Extremities  Edema: no edema  Altered Sensation: no numbness or tingling  Upper extremity push / pulls: strong, equal  Left Arm Radial Pulse: moderate   Left Upper extremity Capillary Refill: less than 3 seconds  Right Arm Radial Pulse: moderate   Right Upper extremity Capillary Refill: less than 3 seconds  Lower extremity pedal push / pulls: strong, equal  Left pedal pulse: moderate   Left posterior tibialis pulse: moderate   Left lower extremity capillary refill: less than 3 seconds  Right pedal pulse: moderate   Right posterior tibialis pulse: moderate   Right lower extremity capillary refill: less than 3 seconds  Drift of extremities: negative   Pain: denies pain    Other issues: no other issues at this time.      1100: no changes in patient physical assessment. 1500 reported off to primary nurse Salvador Mcpherson.        Electronically signed by Markel Roman on 10/17/2023 at 8:43 AM   [facility] /

## 2023-10-17 NOTE — PLAN OF CARE
precautions)     Problem: Hematologic - Adult  Goal: Maintains hematologic stability  Recent Flowsheet Documentation  Taken 10/16/2023 1915 by Sarah Mckeon RN  Maintains hematologic stability:   Assess for signs and symptoms of bleeding or hemorrhage   Monitor labs for bleeding or clotting disorders   Care plan reviewed with patient. Patient verbalizes understanding of the plan of care and contributes to goal setting.

## 2023-10-18 VITALS
OXYGEN SATURATION: 97 % | SYSTOLIC BLOOD PRESSURE: 114 MMHG | HEIGHT: 69 IN | RESPIRATION RATE: 16 BRPM | WEIGHT: 169.9 LBS | HEART RATE: 90 BPM | BODY MASS INDEX: 25.17 KG/M2 | DIASTOLIC BLOOD PRESSURE: 72 MMHG | TEMPERATURE: 98.6 F

## 2023-10-18 LAB
ANION GAP SERPL CALC-SCNC: 10 MEQ/L (ref 8–16)
BUN SERPL-MCNC: 10 MG/DL (ref 7–22)
CALCIUM SERPL-MCNC: 9.2 MG/DL (ref 8.5–10.5)
CHLORIDE SERPL-SCNC: 99 MEQ/L (ref 98–111)
CO2 SERPL-SCNC: 27 MEQ/L (ref 23–33)
CREAT SERPL-MCNC: 0.7 MG/DL (ref 0.4–1.2)
DEPRECATED RDW RBC AUTO: 54.1 FL (ref 35–45)
EKG ATRIAL RATE: 87 BPM
EKG P AXIS: 20 DEGREES
EKG P-R INTERVAL: 154 MS
EKG Q-T INTERVAL: 356 MS
EKG QRS DURATION: 82 MS
EKG QTC CALCULATION (BAZETT): 428 MS
EKG R AXIS: 64 DEGREES
EKG T AXIS: 43 DEGREES
EKG VENTRICULAR RATE: 87 BPM
ERYTHROCYTE [DISTWIDTH] IN BLOOD BY AUTOMATED COUNT: 19.1 % (ref 11.5–14.5)
GFR SERPL CREATININE-BSD FRML MDRD: > 60 ML/MIN/1.73M2
GLUCOSE SERPL-MCNC: 91 MG/DL (ref 70–108)
HCT VFR BLD AUTO: 28.4 % (ref 37–47)
HGB BLD-MCNC: 8.4 GM/DL (ref 12–16)
MCH RBC QN AUTO: 23.1 PG (ref 26–33)
MCHC RBC AUTO-ENTMCNC: 29.6 GM/DL (ref 32.2–35.5)
MCV RBC AUTO: 78.2 FL (ref 81–99)
PLATELET # BLD AUTO: 688 THOU/MM3 (ref 130–400)
PMV BLD AUTO: 9.1 FL (ref 9.4–12.4)
POTASSIUM SERPL-SCNC: 4.2 MEQ/L (ref 3.5–5.2)
RBC # BLD AUTO: 3.63 MILL/MM3 (ref 4.2–5.4)
SODIUM SERPL-SCNC: 136 MEQ/L (ref 135–145)
WBC # BLD AUTO: 7.6 THOU/MM3 (ref 4.8–10.8)

## 2023-10-18 PROCEDURE — 6370000000 HC RX 637 (ALT 250 FOR IP): Performed by: PHYSICIAN ASSISTANT

## 2023-10-18 PROCEDURE — 99239 HOSP IP/OBS DSCHRG MGMT >30: CPT

## 2023-10-18 PROCEDURE — 80048 BASIC METABOLIC PNL TOTAL CA: CPT

## 2023-10-18 PROCEDURE — 6360000002 HC RX W HCPCS: Performed by: INTERNAL MEDICINE

## 2023-10-18 PROCEDURE — 6360000002 HC RX W HCPCS

## 2023-10-18 PROCEDURE — 6370000000 HC RX 637 (ALT 250 FOR IP)

## 2023-10-18 PROCEDURE — 85027 COMPLETE CBC AUTOMATED: CPT

## 2023-10-18 PROCEDURE — 2580000003 HC RX 258: Performed by: INTERNAL MEDICINE

## 2023-10-18 PROCEDURE — 36415 COLL VENOUS BLD VENIPUNCTURE: CPT

## 2023-10-18 RX ORDER — ASPIRIN 81 MG/1
81 TABLET, CHEWABLE ORAL DAILY
Qty: 30 TABLET | Refills: 0 | Status: SHIPPED | OUTPATIENT
Start: 2023-10-19

## 2023-10-18 RX ORDER — SCOLOPAMINE TRANSDERMAL SYSTEM 1 MG/1
1 PATCH, EXTENDED RELEASE TRANSDERMAL
Qty: 1 PATCH | Refills: 0 | Status: SHIPPED | OUTPATIENT
Start: 2023-10-19

## 2023-10-18 RX ORDER — FERROUS SULFATE 325(65) MG
325 TABLET ORAL 2 TIMES DAILY WITH MEALS
Qty: 30 TABLET | Refills: 3 | Status: SHIPPED | OUTPATIENT
Start: 2023-10-18 | End: 2023-10-20 | Stop reason: SDUPTHER

## 2023-10-18 RX ADMIN — MEROPENEM 1000 MG: 1 INJECTION, POWDER, FOR SOLUTION INTRAVENOUS at 15:36

## 2023-10-18 RX ADMIN — MEROPENEM 1000 MG: 1 INJECTION, POWDER, FOR SOLUTION INTRAVENOUS at 06:35

## 2023-10-18 RX ADMIN — LINEZOLID 600 MG: 600 INJECTION, SOLUTION INTRAVENOUS at 02:17

## 2023-10-18 RX ADMIN — FERROUS SULFATE TAB 325 MG (65 MG ELEMENTAL FE) 325 MG: 325 (65 FE) TAB at 09:04

## 2023-10-18 RX ADMIN — FERROUS SULFATE TAB 325 MG (65 MG ELEMENTAL FE) 325 MG: 325 (65 FE) TAB at 17:39

## 2023-10-18 RX ADMIN — ENOXAPARIN SODIUM 40 MG: 100 INJECTION SUBCUTANEOUS at 09:04

## 2023-10-18 RX ADMIN — ASPIRIN 81 MG CHEWABLE TABLET 81 MG: 81 TABLET CHEWABLE at 09:04

## 2023-10-18 RX ADMIN — LINEZOLID 600 MG: 600 INJECTION, SOLUTION INTRAVENOUS at 14:27

## 2023-10-18 RX ADMIN — TESTOSTERONE CYPIONATE 50 MG: 200 INJECTION, SOLUTION INTRAMUSCULAR at 09:08

## 2023-10-18 ASSESSMENT — PAIN DESCRIPTION - LOCATION
LOCATION: ABDOMEN
LOCATION: ABDOMEN

## 2023-10-18 ASSESSMENT — PAIN DESCRIPTION - ORIENTATION
ORIENTATION: MID;LOWER
ORIENTATION: MID;LOWER

## 2023-10-18 ASSESSMENT — PAIN DESCRIPTION - DESCRIPTORS
DESCRIPTORS: DISCOMFORT
DESCRIPTORS: ACHING;DULL

## 2023-10-18 ASSESSMENT — PAIN DESCRIPTION - ONSET
ONSET: ON-GOING
ONSET: GRADUAL

## 2023-10-18 ASSESSMENT — PAIN DESCRIPTION - PAIN TYPE
TYPE: SURGICAL PAIN
TYPE: SURGICAL PAIN

## 2023-10-18 ASSESSMENT — PAIN SCALES - GENERAL
PAINLEVEL_OUTOF10: 1
PAINLEVEL_OUTOF10: 2

## 2023-10-18 ASSESSMENT — PAIN DESCRIPTION - FREQUENCY
FREQUENCY: INTERMITTENT
FREQUENCY: INTERMITTENT

## 2023-10-18 NOTE — PROGRESS NOTES
CLINICAL PHARMACY: DISCHARGE MED RECONCILIATION/REVIEW    [facility]) Select Patient?: No  Total # of Interventions Recommended: 0   -   Total # Interventions Accepted: 0  Intervention Severity:   - Level 1 Intervention Present?: No   - Level 2 #: 0   - Level 3 #: 0   Time Spent (min): 15    Additional Documentation:

## 2023-10-18 NOTE — DISCHARGE SUMMARY
thou/mm3    MPV 8.7 (L) 9.4 - 12.4 fL   Comprehensive Metabolic Panel    Collection Time: 10/16/23  4:13 AM   Result Value Ref Range    Glucose 91 70 - 108 mg/dL    Creatinine 0.7 0.4 - 1.2 mg/dL    BUN 9 7 - 22 mg/dL    Sodium 135 135 - 145 meq/L    Potassium 3.9 3.5 - 5.2 meq/L    Chloride 97 (L) 98 - 111 meq/L    CO2 27 23 - 33 meq/L    Calcium 9.1 8.5 - 10.5 mg/dL    AST 19 5 - 40 U/L    Alkaline Phosphatase 63 38 - 126 U/L    Total Protein 6.5 6.1 - 8.0 g/dL    Albumin 2.8 (L) 3.5 - 5.1 g/dL    Total Bilirubin 0.3 0.3 - 1.2 mg/dL    ALT 18 11 - 66 U/L   Anion Gap    Collection Time: 10/16/23  4:13 AM   Result Value Ref Range    Anion Gap 11.0 8.0 - 16.0 meq/L   Glomerular Filtration Rate, Estimated    Collection Time: 10/16/23  4:13 AM   Result Value Ref Range    Est, Glom Filt Rate >60 >60 ml/min/1.73m2   EKG 12 Lead    Collection Time: 10/16/23  3:09 PM   Result Value Ref Range    Ventricular Rate 87 BPM    Atrial Rate 87 BPM    P-R Interval 154 ms    QRS Duration 82 ms    Q-T Interval 356 ms    QTc Calculation (Bazett) 428 ms    P Axis 20 degrees    R Axis 64 degrees    T Axis 43 degrees   CBC    Collection Time: 10/17/23  7:59 AM   Result Value Ref Range    WBC 9.0 4.8 - 10.8 thou/mm3    RBC 3.66 (L) 4.20 - 5.40 mill/mm3    Hemoglobin 8.5 (L) 12.0 - 16.0 gm/dl    Hematocrit 28.6 (L) 37.0 - 47.0 %    MCV 78.1 (L) 81.0 - 99.0 fL    MCH 23.2 (L) 26.0 - 33.0 pg    MCHC 29.7 (L) 32.2 - 35.5 gm/dl    RDW-CV 19.3 (H) 11.5 - 14.5 %    RDW-SD 53.1 (H) 35.0 - 45.0 fL    Platelets 449 (H) 294 - 400 thou/mm3    MPV 9.0 (L) 9.4 - 12.4 fL   Basic Metabolic Panel w/ Reflex to MG    Collection Time: 10/17/23  7:59 AM   Result Value Ref Range    Sodium 140 135 - 145 meq/L    Potassium reflex Magnesium 4.4 3.5 - 5.2 meq/L    Chloride 102 98 - 111 meq/L    CO2 25 23 - 33 meq/L    Glucose 94 70 - 108 mg/dL    BUN 12 7 - 22 mg/dL    Creatinine 0.7 0.4 - 1.2 mg/dL    Calcium 9.1 8.5 - 10.5 mg/dL   Anion Gap    Collection Time: Face-to-face time with patient 15 minutes. PROCEDURE: Signed informed consent was obtained prior to performing this procedure. The patient was placed on the CT scanner and sedated, as indicated above. ALL CT SCANS AT THIS FACILITY use dose modulation, iterative reconstruction, and/or weight-based dosing when appropriate to reduce radiation dose to as low as reasonably achievable. CT images were initially obtained to determine appropriate puncture site. The skin was marked, prepped, and draped in a sterile fashion. Following local anesthesia and utilizing aseptic technique, a needle was successfully passed into the abscess pocket. A small amount of fluid was aspirated to confirm appropriate needle position. A guidewire was then passed through the needle followed by insertion of progressively larger dilators up to an 8 [place] size. An 8 [place] multi-side-hole drainage catheter was  then passed over the wire and was coiled within the abscess pocket. The retaining suture was then locked in the standard fashion. Catheter was then hooked to a Des-Close drainage bag and the catheter was flushed several times with sterile saline. The catheter was stabilized to the skin with a Percu-Stay device. A sample of the fluid was sent to laboratory for culture and sensitivity testing. Status post successful abscess drainage procedure. . **This report has been created using voice recognition software. It may contain minor errors which are inherent in voice recognition technology. ** Final report electronically signed by Dr Hope Conway on 9/30/2023 3:23 PM    CT ABDOMEN PELVIS W IV CONTRAST Additional Contrast? None    Result Date: 9/30/2023  ** ADDENDUM #1  This report was discussed with Alanna Hatch on Sep 30, 2023 01:40:00 EDT. This document has been electronically signed by: Bonnie Patton on 09/30/2023 02:20 AM  ORIGINAL REPORT  CT abdomen and pelvis with contrast Comparison: 09/21/2023 Findings:  There has been

## 2023-10-18 NOTE — CARE COORDINATION
Anticipating discharge home today. General surgery ok for D/C. Continued IV Zyvox and Merrem, ID note from yesterday anticipate stopping these today. 10/18/23, 2:25 PM EDT    Patient goals/plan/ treatment preferences discussed by  and . Patient goals/plan/ treatment preferences reviewed with patient/ family. Patient/ family verbalize understanding of discharge plan and are in agreement with goal/plan/treatment preferences. Understanding was demonstrated using the teach back method. AVS provided by RN at time of discharge, which includes all necessary medical information pertaining to the patients current course of illness, treatment, post-discharge goals of care, and treatment preferences. Services At/After Discharge: None             Appointment scheduled with the resident's clinic for new PCP set up as noted preference.

## 2023-10-18 NOTE — FLOWSHEET NOTE
10/18/23 1157   Safe Environment   Safety Measures Call light within reach;Standard Safety Measures  (Virtual nurse safety round completed.)     Patient is awake and  alert and answers questions. No distress noted. Referred patient to page 13 of the handbook for fall prevention review. Educated on call light use. Patient voiced understanding. Call light in reach. VN asked patient about need for potty, pain, repositioning, personal belongs and needs. No needs at this time.

## 2023-10-18 NOTE — PROGRESS NOTES
Patient refused to have a virtual nurse to review discharge papers. Discharge teaching and instructions for diagnosis/procedure of intra abdominal abscess completed with patient using teachback method. AVS reviewed. Printed prescriptions given to patient. Patient voiced understanding regarding prescriptions, follow up appointments, and care of self at home.  Discharged in a wheelchair to  independent living per friend

## 2023-10-18 NOTE — FLOWSHEET NOTE
10/18/23 3526   Safe Environment   Safety Measures Bed in low position;Call light within reach; Fall prevention (comment); Side rails X 2;Standard Safety Measures  (Virtual nurse safety round completed)     Patient is awake and  alert and answers questions. No distress noted. VN asked patient about need for potty, pain, repositioning, personal belongs and needs. No needs at this time.

## 2023-10-18 NOTE — PLAN OF CARE
Instruct and encourage patient and family to use good hand hygiene technique     Problem: Metabolic/Fluid and Electrolytes - Adult  Goal: Electrolytes maintained within normal limits  10/18/2023 0154 by Jamey Redd RN  Outcome: Progressing  Flowsheets (Taken 10/17/2023 1920)  Electrolytes maintained within normal limits: Monitor labs and assess patient for signs and symptoms of electrolyte imbalances     Problem: Metabolic/Fluid and Electrolytes - Adult  Goal: Hemodynamic stability and optimal renal function maintained  10/18/2023 0154 by Jamey Redd RN  Outcome: Progressing  Flowsheets (Taken 10/17/2023 1920)  Hemodynamic stability and optimal renal function maintained:   Monitor labs and assess for signs and symptoms of volume excess or deficit   Monitor intake, output and patient weight   Monitor urine specific gravity, serum osmolarity and serum sodium as indicated or ordered   Monitor response to interventions for patient's volume status, including labs, urine output, blood pressure (other measures as available)     Problem: Skin/Tissue Integrity  Goal: Absence of new skin breakdown  Description: 1. Monitor for areas of redness and/or skin breakdown  2. Assess vascular access sites hourly  3. Every 4-6 hours minimum:  Change oxygen saturation probe site  4. Every 4-6 hours:  If on nasal continuous positive airway pressure, respiratory therapy assess nares and determine need for appliance change or resting period. 10/18/2023 0154 by Jamey Redd RN  Outcome: Progressing  Note: Assess skin integrity and implement interventions as needed.       Problem: Nutrition Deficit:  Goal: Optimize nutritional status  10/18/2023 0154 by Jamey Redd RN  Outcome: Progressing  Flowsheets (Taken 10/18/2023 0154)  Nutrient intake appropriate for improving, restoring, or maintaining nutritional needs:   Assess nutritional status and recommend course of action   Monitor oral intake, labs, and Adult  Goal: Maintains hematologic stability  Recent Flowsheet Documentation  Taken 10/17/2023 1920 by Leon Rocha RN  Maintains hematologic stability:   Assess for signs and symptoms of bleeding or hemorrhage   Monitor labs for bleeding or clotting disorders    Care plan reviewed with patient. Patient verbalize understanding of the plan of care and contribute to goal setting.

## 2023-10-18 NOTE — PLAN OF CARE
Problem: Discharge Planning  Goal: Discharge to home or other facility with appropriate resources  Outcome: Adequate for Discharge     Problem: Pain  Goal: Verbalizes/displays adequate comfort level or baseline comfort level  Outcome: Adequate for Discharge     Problem: Safety - Adult  Goal: Free from fall injury  Outcome: Adequate for Discharge     Problem: Skin/Tissue Integrity - Adult  Goal: Skin integrity remains intact  Outcome: Adequate for Discharge  Goal: Incisions, wounds, or drain sites healing without S/S of infection  Outcome: Adequate for Discharge     Problem: Gastrointestinal - Adult  Goal: Minimal or absence of nausea and vomiting  Outcome: Adequate for Discharge  Goal: Maintains adequate nutritional intake  Outcome: Adequate for Discharge     Problem: Infection - Adult  Goal: Absence of infection at discharge  Outcome: Adequate for Discharge  Goal: Absence of infection during hospitalization  Outcome: Adequate for Discharge     Problem: Metabolic/Fluid and Electrolytes - Adult  Goal: Electrolytes maintained within normal limits  Outcome: Adequate for Discharge  Goal: Hemodynamic stability and optimal renal function maintained  Outcome: Adequate for Discharge     Problem: Skin/Tissue Integrity  Goal: Absence of new skin breakdown  Description: 1. Monitor for areas of redness and/or skin breakdown  2. Assess vascular access sites hourly  3. Every 4-6 hours minimum:  Change oxygen saturation probe site  4. Every 4-6 hours:  If on nasal continuous positive airway pressure, respiratory therapy assess nares and determine need for appliance change or resting period.   Outcome: Adequate for Discharge     Problem: Nutrition Deficit:  Goal: Optimize nutritional status  Outcome: Adequate for Discharge

## 2023-10-19 LAB
BACTERIA SPEC AEROBE CULT: ABNORMAL
BACTERIA SPEC ANAEROBE CULT: ABNORMAL
BACTERIA SPEC ANAEROBE CULT: ABNORMAL
GRAM STN SPEC: ABNORMAL
ORGANISM: ABNORMAL

## 2023-10-20 ENCOUNTER — OFFICE VISIT (OUTPATIENT)
Dept: FAMILY MEDICINE CLINIC | Age: 23
End: 2023-10-20
Payer: MEDICAID

## 2023-10-20 VITALS
WEIGHT: 166.4 LBS | TEMPERATURE: 98.6 F | BODY MASS INDEX: 24.65 KG/M2 | RESPIRATION RATE: 20 BRPM | OXYGEN SATURATION: 98 % | DIASTOLIC BLOOD PRESSURE: 88 MMHG | SYSTOLIC BLOOD PRESSURE: 130 MMHG | HEIGHT: 69 IN | HEART RATE: 105 BPM

## 2023-10-20 DIAGNOSIS — K59.04 CHRONIC IDIOPATHIC CONSTIPATION: ICD-10-CM

## 2023-10-20 DIAGNOSIS — D75.839 THROMBOCYTOSIS: ICD-10-CM

## 2023-10-20 DIAGNOSIS — T81.43XA POSTOPERATIVE INTRA-ABDOMINAL ABSCESS: ICD-10-CM

## 2023-10-20 DIAGNOSIS — D50.9 MICROCYTIC ANEMIA: ICD-10-CM

## 2023-10-20 DIAGNOSIS — R11.0 NAUSEA: ICD-10-CM

## 2023-10-20 DIAGNOSIS — D50.8 OTHER IRON DEFICIENCY ANEMIA: Primary | ICD-10-CM

## 2023-10-20 DIAGNOSIS — K35.80 ACUTE APPENDICITIS, UNSPECIFIED ACUTE APPENDICITIS TYPE: ICD-10-CM

## 2023-10-20 DIAGNOSIS — G43.809 OTHER MIGRAINE WITHOUT STATUS MIGRAINOSUS, NOT INTRACTABLE: ICD-10-CM

## 2023-10-20 PROCEDURE — 99204 OFFICE O/P NEW MOD 45 MIN: CPT

## 2023-10-20 RX ORDER — NEEDLES, SAFETY 22GX1 1/2"
1 NEEDLE, DISPOSABLE MISCELLANEOUS PRN
COMMUNITY
Start: 2023-09-22

## 2023-10-20 RX ORDER — FERROUS SULFATE 325(65) MG
325 TABLET ORAL EVERY OTHER DAY
Qty: 30 TABLET | Refills: 3
Start: 2023-10-20

## 2023-10-20 RX ORDER — MECLIZINE HCL 12.5 MG/1
12.5 TABLET ORAL 3 TIMES DAILY PRN
Qty: 30 TABLET | Refills: 0 | Status: SHIPPED | OUTPATIENT
Start: 2023-10-20 | End: 2023-10-30

## 2023-10-20 SDOH — ECONOMIC STABILITY: HOUSING INSECURITY
IN THE LAST 12 MONTHS, WAS THERE A TIME WHEN YOU DID NOT HAVE A STEADY PLACE TO SLEEP OR SLEPT IN A SHELTER (INCLUDING NOW)?: NO

## 2023-10-20 SDOH — ECONOMIC STABILITY: INCOME INSECURITY: HOW HARD IS IT FOR YOU TO PAY FOR THE VERY BASICS LIKE FOOD, HOUSING, MEDICAL CARE, AND HEATING?: NOT HARD AT ALL

## 2023-10-20 SDOH — ECONOMIC STABILITY: FOOD INSECURITY: WITHIN THE PAST 12 MONTHS, THE FOOD YOU BOUGHT JUST DIDN'T LAST AND YOU DIDN'T HAVE MONEY TO GET MORE.: NEVER TRUE

## 2023-10-20 SDOH — ECONOMIC STABILITY: FOOD INSECURITY: WITHIN THE PAST 12 MONTHS, YOU WORRIED THAT YOUR FOOD WOULD RUN OUT BEFORE YOU GOT MONEY TO BUY MORE.: NEVER TRUE

## 2023-10-20 ASSESSMENT — PATIENT HEALTH QUESTIONNAIRE - PHQ9
SUM OF ALL RESPONSES TO PHQ QUESTIONS 1-9: 1
SUM OF ALL RESPONSES TO PHQ QUESTIONS 1-9: 1
SUM OF ALL RESPONSES TO PHQ9 QUESTIONS 1 & 2: 1
1. LITTLE INTEREST OR PLEASURE IN DOING THINGS: 1
SUM OF ALL RESPONSES TO PHQ QUESTIONS 1-9: 1
2. FEELING DOWN, DEPRESSED OR HOPELESS: 0
SUM OF ALL RESPONSES TO PHQ QUESTIONS 1-9: 1

## 2023-10-20 ASSESSMENT — ENCOUNTER SYMPTOMS
DIARRHEA: 0
ABDOMINAL PAIN: 0
SHORTNESS OF BREATH: 0
NAUSEA: 1
VOMITING: 0
CONSTIPATION: 1

## 2023-10-20 NOTE — PROGRESS NOTES
[address] [facility] [address] [place] [id number]  Dept: [phone]  Loc: [address] [place] [address] Kim (:  2000) is a 25 y.o. adult,New patient, here for evaluation of the following chief complaint(s):  Follow-Up from Hospital (Abdominal abcess/Pain manageable. Patient now experiencing weakness. )      ASSESSMENT/PLAN:    1. Other iron deficiency anemia   Acute on Chronic. Blood loss from appendectomy worsening existing iron deficiency anemia  -     CBC; Future  -     ferrous sulfate (IRON 325) 325 (65 Fe) MG tablet; Take 1 tablet by mouth every other day, Disp-30 tablet, R-3NO PRINT  2. Acute appendicitis, unspecified acute appendicitis type   Worsened existing chronic iron deficiency anemia  -     CBC; Future  3. Postoperative intra-abdominal abscess   Wounds appear uninfected and healing well. Was drained in hospital.   Monitor  4. Thrombocytosis  -     CBC; Future  5. Nausea   Zofran was ineffective inpatient, allergic to compazine. Scopolamine patch partially effective at 1x 72 hours. -     meclizine (ANTIVERT) 12.5 MG tablet; Take 1 tablet by mouth 3 times daily as needed for Nausea, Disp-30 tablet, R-0Normal  6. Chronic idiopathic constipation   Patient prefers to self-manage with   7. Other migraine without status migrainosus, not intractable   Triggered by caffeine, avoid caffeine. Reports being up to date on vaccines, had booster pre college, 2 covid + 2 boosters. Patient declines additional screening or vaccines at this time. Currently uninsured. Requesting vaccine records to enter into this EMR. Return in about 1 month (around 2023), or if symptoms worsen or fail to improve. SUBJECTIVE/OBJECTIVE:    Patient is doing pretty good. Mood is ok, no SI. Doing better here than when was with family. 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     Has

## 2023-10-20 NOTE — PROGRESS NOTES
Attending Physician Note    I saw and evaluated the patient, performing the key elements of the service. I discussed the findings, assessment and plan with the resident and agree with the resident's findings and plan as documented in the resident's note. GC modifier added. Brief summary:  S/p appendectomy with post-operative intra-abdominal abscess -  Progressing adequately. No evidence of recurrence of infection. Fe def anemia - decrease iron supplement to every other day. Recheck CBC 2 wks  Thrombocytosis, reactive - asa initiated in hospital.  Continue for now, but discontinue after next CBC unless platelet count increases from current level. Nausea - ondansetron not helpful when hospitalized. Allergic reaction to prochlorperazine. Has transderm scopolamine. Transition to prn meclizine.

## 2023-10-24 ENCOUNTER — NURSE ONLY (OUTPATIENT)
Dept: LAB | Age: 23
End: 2023-10-24

## 2023-10-24 DIAGNOSIS — D50.8 OTHER IRON DEFICIENCY ANEMIA: ICD-10-CM

## 2023-10-24 DIAGNOSIS — K35.80 ACUTE APPENDICITIS, UNSPECIFIED ACUTE APPENDICITIS TYPE: ICD-10-CM

## 2023-10-24 DIAGNOSIS — D75.839 THROMBOCYTOSIS: ICD-10-CM

## 2023-10-24 LAB
DEPRECATED RDW RBC AUTO: 58.4 FL (ref 35–45)
ERYTHROCYTE [DISTWIDTH] IN BLOOD BY AUTOMATED COUNT: 20.8 % (ref 11.5–14.5)
HCT VFR BLD AUTO: 33 % (ref 37–47)
HGB BLD-MCNC: 9.8 GM/DL (ref 12–16)
MCH RBC QN AUTO: 24 PG (ref 26–33)
MCHC RBC AUTO-ENTMCNC: 29.7 GM/DL (ref 32.2–35.5)
MCV RBC AUTO: 80.7 FL (ref 81–99)
PLATELET # BLD AUTO: 511 THOU/MM3 (ref 130–400)
PMV BLD AUTO: 9.5 FL (ref 9.4–12.4)
RBC # BLD AUTO: 4.09 MILL/MM3 (ref 4.2–5.4)
WBC # BLD AUTO: 8.3 THOU/MM3 (ref 4.8–10.8)

## 2023-10-30 DIAGNOSIS — D75.839 THROMBOCYTOSIS: Primary | ICD-10-CM

## 2023-10-31 ENCOUNTER — TELEPHONE (OUTPATIENT)
Dept: FAMILY MEDICINE CLINIC | Age: 23
End: 2023-10-31

## 2023-10-31 NOTE — TELEPHONE ENCOUNTER
Please call patient to inform them that the blood tests including hemoglobin are still abnormal, but are improving. Will discuss next visit.

## 2023-11-01 ENCOUNTER — TELEPHONE (OUTPATIENT)
Dept: ONCOLOGY | Age: 23
End: 2023-11-01

## 2023-11-06 LAB
FUNGUS SPEC CULT: NORMAL
FUNGUS SPEC FUNGUS STN: NORMAL
